# Patient Record
Sex: MALE | Race: WHITE | Employment: UNEMPLOYED | URBAN - METROPOLITAN AREA
[De-identification: names, ages, dates, MRNs, and addresses within clinical notes are randomized per-mention and may not be internally consistent; named-entity substitution may affect disease eponyms.]

---

## 2017-06-26 ENCOUNTER — TRANSFERRED RECORDS (OUTPATIENT)
Dept: HEALTH INFORMATION MANAGEMENT | Facility: CLINIC | Age: 57
End: 2017-06-26

## 2017-06-28 ENCOUNTER — TRANSFERRED RECORDS (OUTPATIENT)
Dept: HEALTH INFORMATION MANAGEMENT | Facility: CLINIC | Age: 57
End: 2017-06-28

## 2017-06-29 ENCOUNTER — TRANSFERRED RECORDS (OUTPATIENT)
Dept: HEALTH INFORMATION MANAGEMENT | Facility: CLINIC | Age: 57
End: 2017-06-29

## 2017-06-29 LAB
EJECTION FRACTION: 68
EJECTION FRACTION: 69

## 2017-06-30 ENCOUNTER — TRANSFERRED RECORDS (OUTPATIENT)
Dept: HEALTH INFORMATION MANAGEMENT | Facility: CLINIC | Age: 57
End: 2017-06-30

## 2017-10-04 ENCOUNTER — TRANSFERRED RECORDS (OUTPATIENT)
Dept: HEALTH INFORMATION MANAGEMENT | Facility: CLINIC | Age: 57
End: 2017-10-04

## 2017-10-23 ENCOUNTER — TRANSFERRED RECORDS (OUTPATIENT)
Dept: HEALTH INFORMATION MANAGEMENT | Facility: CLINIC | Age: 57
End: 2017-10-23

## 2017-10-24 ENCOUNTER — MEDICAL CORRESPONDENCE (OUTPATIENT)
Dept: HEALTH INFORMATION MANAGEMENT | Facility: CLINIC | Age: 57
End: 2017-10-24

## 2017-10-24 ENCOUNTER — TRANSFERRED RECORDS (OUTPATIENT)
Dept: HEALTH INFORMATION MANAGEMENT | Facility: CLINIC | Age: 57
End: 2017-10-24

## 2017-11-07 ENCOUNTER — TELEPHONE (OUTPATIENT)
Dept: TRANSPLANT | Facility: CLINIC | Age: 57
End: 2017-11-07

## 2017-11-07 NOTE — LETTER
2017    Selena Givens  [place] [address] [address] [facility] [address]  Phone: [phone]  Fax: [phone]     Re: Charanjit Ibarra  : 1960     Dear Dr. Selena Givens,    Thank you for referring your patient, Charanjit Ibarra. We are writing to inform you that Mr. Ibarra has completed the initial referral process with us to be considered for a liver transplant at the [facility].    Mr. Ibarra assigned transplant coordinator is Rosalba Peres.  If you should have any questions or concerns, please feel free to call us at [phone] or [phone].  Our regular office hours are Monday - Friday from 8:30am to 5:00pm.    Sincerely,        Liver Transplant Team  [facility]

## 2017-11-07 NOTE — TELEPHONE ENCOUNTER
Received liver referral from [facility]  Referring- Selena Givens  Diagnosis- Alcoholic Cirrhosis of liver with ascites  Coordinator- Rosalba Peres  Insurance- Medicare-[id number] & B  [employer] [place] [employer]- ID- [id number]

## 2017-11-08 VITALS — HEIGHT: 68 IN | WEIGHT: 175 LBS | BODY MASS INDEX: 26.52 KG/M2

## 2017-11-08 NOTE — TELEPHONE ENCOUNTER
Intake Progress Note    Organ: Liver    Initial Call Made by: records [facility]    Referring Physician:Dr Selena Givens    Seen Dr Tatiana Hoffman    PCP- Dr Teddy Young- [place]    Assigned Coordinator: Rosalba Peres    Reported Diagnosis: Decompensated ETOH Cirrhosis      On Dialysis:   No    Dialysis Schedule:  Days/shift  or N/A    Reported Medical History: Cirrhosis- had paracentesis in the past- increased diuretics and last 2 times has not needed a paracentesis, No inpt stays past year    Records:  I will request.     Clinic RN Appt (Only Adult Kidney, Liver and Pancreas Referrals): Y    Preferred Evaluation Start Date:  ASAP     Online Forms    Best time patient can be reached: anytime    Type of packet sent:     Liver packet     Misc. Notes: May speak with emergency contacts listed in OTTR. Caregiver- Kristen    Verbal Consent: Y     Email Consent: Y or N    If no PCP or referring information the time of call informed pt to call back with information: Y or N    Informed patient to call if doesn't receive packet and or phone call from coordinator within given time Y

## 2017-11-09 ENCOUNTER — APPOINTMENT (OUTPATIENT)
Dept: TRANSPLANT | Facility: CLINIC | Age: 57
End: 2017-11-09
Attending: INTERNAL MEDICINE
Payer: MEDICARE

## 2017-11-09 NOTE — TELEPHONE ENCOUNTER
"Referred by: GI, Dr. Givens. Liver eval in [place] 6/17, reports too healthy and lack of support he was not a candidate. Records requested.      Alcoholic cirrhosis but patient voices due to \" all of the medications they put me on\", stopped drinking over a year ago with no AA/treatment. Ascites managed by diuretics with last tap >6 mo, HE managed with lactulose and Rifaximin, Varices grade 1-2, due for EGD. Colonoscopy 2017, due 2022. Chronic opiate use due to DDD.     MELD 23 from 10/2017     Social History  Lives with his girlfriend, Kylee.   Working: Disabled for 15 years  ADL's: caretaker to help. DDD     PMH: chronic opiate use, HTN,  DDD, PTSD (reports worked for [employer] that no one talks about)     Surgical History: umbilical hernia and arm/hand trauma    Nicotine:   NA          Plan: Liver evaluation week of 12/11/2017. Orders sent.     Patient contacted to discuss liver transplant evaluation, purpose and process. Conversation included: discussing transplant team, scans, x-rays and lab draws, including HIV consent. History was obtained (see snapshot) and general health maintenance encouraged (dental, alcohol/drug free, low sodium diet, healthy eating/exercise). Patient appeared receptive and questions were answered. Patient aware he will receive transplant packet and agrees to return application and MONTY upon completion.             "

## 2017-11-10 ENCOUNTER — MEDICAL CORRESPONDENCE (OUTPATIENT)
Dept: TRANSPLANT | Facility: CLINIC | Age: 57
End: 2017-11-10

## 2017-11-13 DIAGNOSIS — Z79.891 LONG TERM CURRENT USE OF OPIATE ANALGESIC: ICD-10-CM

## 2017-11-13 DIAGNOSIS — F10.11 ALCOHOL ABUSE, IN REMISSION: ICD-10-CM

## 2017-11-13 DIAGNOSIS — Z12.5 ENCOUNTER FOR SCREENING FOR MALIGNANT NEOPLASM OF PROSTATE: ICD-10-CM

## 2017-11-13 DIAGNOSIS — K70.31 ALCOHOLIC CIRRHOSIS OF LIVER WITH ASCITES (H): Primary | ICD-10-CM

## 2017-11-13 DIAGNOSIS — E55.9 VITAMIN D DEFICIENCY: ICD-10-CM

## 2017-11-13 DIAGNOSIS — R93.89 ABNORMAL FINDINGS ON DIAGNOSTIC IMAGING OF OTHER SPECIFIED BODY STRUCTURES: ICD-10-CM

## 2017-11-13 DIAGNOSIS — K74.60 CIRRHOSIS (H): ICD-10-CM

## 2017-11-14 ENCOUNTER — MEDICAL CORRESPONDENCE (OUTPATIENT)
Dept: TRANSPLANT | Facility: CLINIC | Age: 57
End: 2017-11-14

## 2017-11-15 ENCOUNTER — MEDICAL CORRESPONDENCE (OUTPATIENT)
Dept: TRANSPLANT | Facility: CLINIC | Age: 57
End: 2017-11-15

## 2017-11-15 ENCOUNTER — TELEPHONE (OUTPATIENT)
Dept: TRANSPLANT | Facility: CLINIC | Age: 57
End: 2017-11-15

## 2017-11-15 NOTE — TELEPHONE ENCOUNTER
November 15: I called Charanjit this afternoon and left a message to introduce myself and acknowledge a change in evaluation date. I confirmed that we will work on his schedule for Monday, December 18 and Tuesday, December 19.    Indira Forrest  Pre-Liver Transplant   [phone]    Rosalba Peres, RN  Indira Forrest            Orders Only for Transplant Evaluation  11/13/2017       Rosalba Peres RN - [facility] Solid Organ Transplant Encounter Summary       Diagnoses       Alcoholic Cirrhosis Of Liver With Ascites (h) (Primary)       Abnormal findings on diagnostic imaging of other specified body structures; Encounter for screening for malignant neoplasm of prostate; Vitamin D deficiency; Long term current use of opiate analgesic; Alcohol abuse, in remission; Cirrhosis (H)                Orders Signed This Encounter (38)      Hepatitis C antibody        Ethyl Glucuronide Urine        X-ray Chest 2 vws [IMG36]        CARDIOLOGY EVAL ADULT REFERRAL        UA with Microscopic reflex to Culture        Anti Treponema        HIV Antigen Antibody Combo Pretransplant        Hepatitis B surface antigen        Hepatitis B Surface Antibody        Hepatitis B core antibody        Hepatitis A Antibody IgG        EBV Capsid Antibody IgG        CMV Antibody IgG        Protein  random urine with Creat Ratio        CBC with platelets        INR        TSH with free T4 reflex        Vitamin D Deficiency        Transferrin        Prostate spec antigen screen        Phosphorus        M Tuberculosis by Quantiferon        Iron and iron binding capacity        AFP tumor marker        Hepatic panel        Basic metabolic panel        ABO type [LZL1308]        Antibody titer red cell        ABO/Rh type and screen        Pulse oximetry nursing        Echo dobutamine stress test        EKG 12-lead, tracing only [EKG1]        US Abdomen Complete w Doppler Complete        NUTRITION REFERRAL         REFERRAL         GENERAL SURG ADULT REFERRAL        GASTROENTEROLOGY ADULT REF CONSULT ONLY        Pre-Transplant Class

## 2017-12-04 NOTE — TELEPHONE ENCOUNTER
December 4, 2017: Kristen called back to approve appointments on December 18 and 19. I gave her the telephone number for the Accommodations Department.    Indira Lowe  Pre-Liver Transplant   [phone]

## 2017-12-07 NOTE — TELEPHONE ENCOUNTER
December 7, 2017: I spoke with Kristen to confirm that we're scheduling Charanjit for December 18 and 19.    She asked me to email something to [email or web address] so she can get a medical discount on the hotel.    Indira Lowe  Pre-Liver Transplant   [phone]

## 2017-12-18 ENCOUNTER — HOSPITAL ENCOUNTER (OUTPATIENT)
Dept: CARDIOLOGY | Facility: CLINIC | Age: 57
End: 2017-12-18
Attending: INTERNAL MEDICINE
Payer: MEDICARE

## 2017-12-18 ENCOUNTER — OFFICE VISIT (OUTPATIENT)
Dept: TRANSPLANT | Facility: CLINIC | Age: 57
End: 2017-12-18
Attending: INTERNAL MEDICINE
Payer: MEDICARE

## 2017-12-18 ENCOUNTER — HOSPITAL ENCOUNTER (OUTPATIENT)
Dept: GENERAL RADIOLOGY | Facility: CLINIC | Age: 57
End: 2017-12-18
Attending: INTERNAL MEDICINE
Payer: MEDICARE

## 2017-12-18 ENCOUNTER — HOSPITAL ENCOUNTER (OUTPATIENT)
Dept: CARDIOLOGY | Facility: CLINIC | Age: 57
Discharge: HOME | End: 2017-12-18
Attending: INTERNAL MEDICINE | Admitting: INTERNAL MEDICINE
Payer: MEDICARE

## 2017-12-18 DIAGNOSIS — K70.31 ALCOHOLIC CIRRHOSIS OF LIVER WITH ASCITES (H): ICD-10-CM

## 2017-12-18 DIAGNOSIS — R93.89 ABNORMAL FINDINGS ON DIAGNOSTIC IMAGING OF OTHER SPECIFIED BODY STRUCTURES: ICD-10-CM

## 2017-12-18 DIAGNOSIS — K70.30 LAENNEC'S CIRRHOSIS (ALCOHOLIC) (H): Primary | ICD-10-CM

## 2017-12-18 DIAGNOSIS — Z79.891 LONG TERM CURRENT USE OF OPIATE ANALGESIC: ICD-10-CM

## 2017-12-18 PROCEDURE — 25000128 H RX IP 250 OP 636: Performed by: INTERNAL MEDICINE

## 2017-12-18 PROCEDURE — 93350 STRESS TTE ONLY: CPT | Mod: 26 | Performed by: INTERNAL MEDICINE

## 2017-12-18 PROCEDURE — 40000264 ECHO STRESS DOBUTAMINE WITH OPTISON

## 2017-12-18 PROCEDURE — 25500064 ZZH RX 255 OP 636: Performed by: INTERNAL MEDICINE

## 2017-12-18 PROCEDURE — 71020 XR CHEST 2 VW: CPT

## 2017-12-18 PROCEDURE — 93018 CV STRESS TEST I&R ONLY: CPT | Performed by: INTERNAL MEDICINE

## 2017-12-18 PROCEDURE — 25000125 ZZHC RX 250: Performed by: INTERNAL MEDICINE

## 2017-12-18 PROCEDURE — 93321 DOPPLER ECHO F-UP/LMTD STD: CPT | Mod: 26 | Performed by: INTERNAL MEDICINE

## 2017-12-18 PROCEDURE — 93325 DOPPLER ECHO COLOR FLOW MAPG: CPT | Mod: 26 | Performed by: INTERNAL MEDICINE

## 2017-12-18 PROCEDURE — 93016 CV STRESS TEST SUPVJ ONLY: CPT | Performed by: INTERNAL MEDICINE

## 2017-12-18 PROCEDURE — 93005 ELECTROCARDIOGRAM TRACING: CPT

## 2017-12-18 RX ORDER — DOBUTAMINE HYDROCHLORIDE 200 MG/100ML
10-50 INJECTION INTRAVENOUS CONTINUOUS
Status: ACTIVE | OUTPATIENT
Start: 2017-12-18 | End: 2017-12-18

## 2017-12-18 RX ORDER — METOPROLOL TARTRATE 1 MG/ML
1-30 INJECTION, SOLUTION INTRAVENOUS
Status: DISPENSED | OUTPATIENT
Start: 2017-12-18 | End: 2017-12-18

## 2017-12-18 RX ADMIN — DOBUTAMINE IN DEXTROSE 30 MCG/KG/MIN: 200 INJECTION, SOLUTION INTRAVENOUS at 13:03

## 2017-12-18 RX ADMIN — HUMAN ALBUMIN MICROSPHERES AND PERFLUTREN 5 ML: 10; .22 INJECTION, SOLUTION INTRAVENOUS at 13:13

## 2017-12-18 RX ADMIN — METOROPROLOL TARTRATE 2 MG: 5 INJECTION, SOLUTION INTRAVENOUS at 13:10

## 2017-12-18 NOTE — PROGRESS NOTES
Liver Transplant Evaluation/Teaching    TEACHING TOPICS: Evaluation Process, Evaluation Items, Diagnostic Studies, Consultation, Chemical Dependency Policy, CD Eval, Donor Source, Liver Allocation, MELD System, UNOS, Waiting List, Follow up while on transplant list, Follow up after transplantation, Infection and Rejection, Immunosuppression , Medication Teaching, Lab Recording after transplant, Laboratory Frequency after transplant , Consent for evaluation and One year survival rates  INSTRUCTIONAL MATERIAL USED/GIVEN: Liver Transplant Handbook, MELD Booklet, Donor Booklet, Web Sites Options, Verbal instructions, Multiple Listing Brochure , Consent for evaluation signed, One year survival rates and SRTR (Scientific Registry) Data  Person(s) involved in teaching: patient and significant other  Asks Questions: YES  Eager to Learn: YES  Cooperative: YES  Receptive (willing/able to accept information): YES  Reason for the appointment, diagnosis and treatment plan:YES  Knowledge of proper use of medications and conditions for which they are ordered (with special attention to potential side effects or drug interactions): YES  Which situations necessitate calling provider and whom to contact:YES  Other: ED for GI bleed  Teaching Concerns Addressed   Comments: Patient attended class on 2017. Patient asked appropriate questions and verbalized understanding of the material.   Proper use and care of (medical equip, care aids, etc.):YES  Nutritional needs and diet plan: YES  Pain management techniques: YES  Wound Care: YES  How and/when to access community resources: YES  Patient is aware of and agrees to required commitment to post-op care and long term follow-up: YES  Patient has name and phone number of transplant coordinator.   Time Spent face-to-face teachin minutes.

## 2017-12-18 NOTE — LETTER
12/18/2017       RE: Charanjit Ibarra  [address]  [place] [facility] [address]     Dear Colleague,    Thank you for referring your patient, Charanjit Ibarra, to the [facility] SOLID ORGAN TRANSPLANT at [facility]. Please see a copy of my visit note below.    Liver Transplant Evaluation/Teaching    TEACHING TOPICS: Evaluation Process, Evaluation Items, Diagnostic Studies, Consultation, Chemical Dependency Policy, CD Eval, Donor Source, Liver Allocation, MELD System, UNOS, Waiting List, Follow up while on transplant list, Follow up after transplantation, Infection and Rejection, Immunosuppression , Medication Teaching, Lab Recording after transplant, Laboratory Frequency after transplant , Consent for evaluation and One year survival rates  INSTRUCTIONAL MATERIAL USED/GIVEN: Liver Transplant Handbook, MELD Booklet, Donor Booklet, Web Sites Options, Verbal instructions, Multiple Listing Brochure , Consent for evaluation signed, One year survival rates and SRTR (Scientific Registry) Data  Person(s) involved in teaching: patient and significant other  Asks Questions: YES  Eager to Learn: YES  Cooperative: YES  Receptive (willing/able to accept information): YES  Reason for the appointment, diagnosis and treatment plan:YES  Knowledge of proper use of medications and conditions for which they are ordered (with special attention to potential side effects or drug interactions): YES  Which situations necessitate calling provider and whom to contact:YES  Other: ED for GI bleed  Teaching Concerns Addressed   Comments: Patient attended class on 12/18/2017. Patient asked appropriate questions and verbalized understanding of the material.   Proper use and care of (medical equip, care aids, etc.):YES  Nutritional needs and diet plan: YES  Pain management techniques: YES  Wound Care: YES  How and/when to access community resources: YES  Patient is aware of and agrees to required commitment to  post-op care and long term follow-up: YES  Patient has name and phone number of transplant coordinator.   Time Spent face-to-face teachin minutes.            Again, thank you for allowing me to participate in the care of your patient.      Sincerely,    Transplant Class

## 2017-12-18 NOTE — PROGRESS NOTES
Pt here for dobutamine stress test.  Test, meds and side effects reviewed with patient.  Achieved target HR at 50 mcg/kg/min Dobutamine.  Gave a total of 2 mg IV Metoprolol to bring HR back to baseline.  Post monitoring complete and VSS.  Pt escorted out to the gold waiting room.

## 2017-12-18 NOTE — MR AVS SNAPSHOT
After Visit Summary   12/18/2017    Charanjit Ibarra    MRN: [id number]           Patient Information     Date Of Birth          1960        Visit Information        Provider Department      12/18/2017 10:00 AM  TRANSPLANT CLASS [facility] Solid Organ Transplant        Today's Diagnoses     Laennec's cirrhosis (alcoholic) (H)    -  1       Follow-ups after your visit        Your next 10 appointments already scheduled     Dec 19, 2017  7:30 AM CST   US ABDOMEN/PELVIS DUPLEX COMPLETE with UCUS2   [facility] Imaging Center US ([facility])    [address] [place] [id number] [phone]           Please bring a list of your medicines (including vitamins, minerals and over-the-counter drugs). Also, tell your doctor about any allergies you may have. Wear comfortable clothes and leave your valuables at home.  Adults: No eating or drinking for 8 hours before the exam. You may take medicine with a small sip of water.  Children: - Children 6+ years: No food or drink for 6 hours before exam. - Children 1-5 years: No food or drink for 4 hours before exam. - Infants, breast-fed: may have breast milk up to 2 hours before exam. - Infants, formula: may have bottle until 4 hours before exam.  Please call the Imaging Department at your exam site with any questions.            Dec 19, 2017  8:30 AM CST   Lab with  LAB   [facility] Lab ([facility])    [address] [place] [id number] [phone]            Dec 19, 2017  8:45 AM CST   Lab with  LAB   [facility] Lab ([facility])    [address] [place] [address]   [phone]            Dec 19, 2017  9:00 AM CST   (Arrive by 8:45 AM)   New Patient Visit with  TXC EVALUATION   [facility] Solid Organ Transplant ([facility])    [address] [place] [phone]    [phone]            Dec 19, 2017  9:30 AM CST   (Arrive by 9:15 AM)   Liver Transplant Pre-Op with Oumou Pink MD   [facility] Solid Organ Transplant ([facility])    [address] [place] [address]   [phone]            Dec 19, 2017 10:00 AM CST   NUTRITION VISIT with Nabila Jacobs RD   [facility] Solid Organ Transplant ([facility])    [address] [place] [address]   [phone]            Dec 19, 2017 11:00 AM CST   (Arrive by 10:45 AM)   Pre-Transplant Liver Evaluation with Angelito Dietz MD   [facility] Hepatology ([facility])    [address] [place] [address]   [phone]            Dec 19, 2017 12:00 PM CST   (Arrive by 11:45 AM)   SOT SOCIAL WORK EVAL with Janny Mcclelland [facility] Solid Organ Transplant ([facility])    [address] [place] [address]   [phone]            Jan 16, 2018  2:30 PM CST   (Arrive by 2:15 PM)   NEW LIVER EVALUATION with STEPHANY Montero MD   [facility] Heart Care ([facility])    [address] [place] [id number] [phone]              Future tests that were ordered for you today     Open Future Orders        Priority Expected Expires Ordered    ECHO STRESS DOBUTAMINE WITH OPTISON Routine  11/13/2018 11/13/2017            Who to contact     If you have questions or need follow up information about today's clinic visit or your schedule please contact [facility] SOLID ORGAN TRANSPLANT directly at [phone].  Normal or non-critical lab and imaging results will be communicated to you by MyChart, letter or phone within 4 business days after the clinic has received the results. If you do not hear from us within 7 days, please contact the clinic through MyChart or phone. If  you have a critical or abnormal lab result, we will notify you by phone as soon as possible.  Submit refill requests through [employer]arii or call your pharmacy and they will forward the refill request to us. Please allow 3 business days for your refill to be completed.          Additional Information About Your Visit        EPV SOLARhart Information     Satarii gives you secure access to your electronic health record. If you see a primary care provider, you can also send messages to your care team and make appointments. If you have questions, please call your primary care clinic.  If you do not have a primary care provider, please call [phone] and they will assist you.        Care EveryWhere ID     This is your Care EveryWhere ID. This could be used by other organizations to access your [place] medical records  [id number]         Blood Pressure from Last 3 Encounters:   No data found for BP    Weight from Last 3 Encounters:   11/08/17 79.4 kg (175 lb)              Today, you had the following     No orders found for display       Primary Care Provider Fax #    Physician No Ref-Primary [phone]       No address on file        Equal Access to Services     [facility] : Hadii ryley ku hadasho Soomaali, waaxda luqadaha, qaybta kaalmada adeaudreyyawoo, lena srivastava . So [facility] [phone].    ATENCIÓN: Si habla español, tiene a hall disposición servicios gratuitos de asistencia lingüística. Llame al [phone].    We comply with applicable federal civil rights laws and [place] laws. We do not discriminate on the basis of race, color, national origin, age, disability, sex, sexual orientation, or gender identity.            Thank you!     Thank you for choosing [facility] SOLID ORGAN TRANSPLANT  for your care. Our goal is always to provide you with excellent care. Hearing back from our patients is one way we can continue to improve our services. Please take a few minutes to complete the written survey  that you may receive in the mail after your visit with us. Thank you!             Your Updated Medication List - Protect others around you: Learn how to safely use, store and throw away your medicines at www.disposemymeds.org.      Notice  As of 12/18/2017  3:55 PM    You have not been prescribed any medications.

## 2017-12-19 ENCOUNTER — RADIANT APPOINTMENT (OUTPATIENT)
Dept: ULTRASOUND IMAGING | Facility: CLINIC | Age: 57
End: 2017-12-19
Attending: INTERNAL MEDICINE
Payer: COMMERCIAL

## 2017-12-19 ENCOUNTER — COMMITTEE REVIEW (OUTPATIENT)
Dept: TRANSPLANT | Facility: CLINIC | Age: 57
End: 2017-12-19

## 2017-12-19 ENCOUNTER — ALLIED HEALTH/NURSE VISIT (OUTPATIENT)
Dept: TRANSPLANT | Facility: CLINIC | Age: 57
End: 2017-12-19
Attending: INTERNAL MEDICINE
Payer: MEDICARE

## 2017-12-19 ENCOUNTER — APPOINTMENT (OUTPATIENT)
Dept: LAB | Facility: CLINIC | Age: 57
End: 2017-12-19
Payer: COMMERCIAL

## 2017-12-19 ENCOUNTER — OFFICE VISIT (OUTPATIENT)
Dept: GASTROENTEROLOGY | Facility: CLINIC | Age: 57
End: 2017-12-19
Attending: INTERNAL MEDICINE
Payer: MEDICARE

## 2017-12-19 VITALS
HEIGHT: 68 IN | RESPIRATION RATE: 18 BRPM | BODY MASS INDEX: 27.87 KG/M2 | OXYGEN SATURATION: 99 % | SYSTOLIC BLOOD PRESSURE: 131 MMHG | TEMPERATURE: 98.2 F | WEIGHT: 183.9 LBS | HEART RATE: 93 BPM | DIASTOLIC BLOOD PRESSURE: 80 MMHG

## 2017-12-19 DIAGNOSIS — K70.31 ALCOHOLIC CIRRHOSIS OF LIVER WITH ASCITES (H): ICD-10-CM

## 2017-12-19 DIAGNOSIS — Z79.891 LONG TERM CURRENT USE OF OPIATE ANALGESIC: ICD-10-CM

## 2017-12-19 DIAGNOSIS — K70.31 ALCOHOLIC CIRRHOSIS OF LIVER WITH ASCITES (H): Primary | ICD-10-CM

## 2017-12-19 DIAGNOSIS — F10.11 ALCOHOL ABUSE, IN REMISSION: ICD-10-CM

## 2017-12-19 DIAGNOSIS — E55.9 VITAMIN D DEFICIENCY: ICD-10-CM

## 2017-12-19 DIAGNOSIS — K70.30 ALCOHOLIC CIRRHOSIS (H): Primary | ICD-10-CM

## 2017-12-19 DIAGNOSIS — Z12.5 ENCOUNTER FOR SCREENING FOR MALIGNANT NEOPLASM OF PROSTATE: ICD-10-CM

## 2017-12-19 DIAGNOSIS — Z76.82 ORGAN TRANSPLANT CANDIDATE: Primary | ICD-10-CM

## 2017-12-19 DIAGNOSIS — Z01.818 PRE-TRANSPLANT EVALUATION FOR LIVER TRANSPLANT: Primary | ICD-10-CM

## 2017-12-19 LAB
ABO + RH BLD: NORMAL
AFP SERPL-MCNC: 4.3 UG/L (ref 0–8)
ALBUMIN SERPL-MCNC: 3.2 G/DL (ref 3.4–5)
ALBUMIN UR-MCNC: NEGATIVE MG/DL
ALP SERPL-CCNC: 144 U/L (ref 40–150)
ALT SERPL W P-5'-P-CCNC: 17 U/L (ref 0–70)
ANION GAP SERPL CALCULATED.3IONS-SCNC: 9 MMOL/L (ref 3–14)
APPEARANCE UR: CLEAR
AST SERPL W P-5'-P-CCNC: 26 U/L (ref 0–45)
BILIRUB DIRECT SERPL-MCNC: 1.3 MG/DL (ref 0–0.2)
BILIRUB SERPL-MCNC: 4.5 MG/DL (ref 0.2–1.3)
BILIRUB UR QL STRIP: NEGATIVE
BLD GP AB SCN SERPL QL: NORMAL
BLD GP AB SCN TITR SERPL: NORMAL {TITER}
BLOOD BANK CMNT PATIENT-IMP: NORMAL
BUN SERPL-MCNC: 14 MG/DL (ref 7–30)
CALCIUM SERPL-MCNC: 8.6 MG/DL (ref 8.5–10.1)
CHLORIDE SERPL-SCNC: 97 MMOL/L (ref 94–109)
CMV IGG SERPL QL IA: >8 AI (ref 0–0.8)
CO2 SERPL-SCNC: 27 MMOL/L (ref 20–32)
COLOR UR AUTO: YELLOW
CREAT SERPL-MCNC: 1.09 MG/DL (ref 0.66–1.25)
CREAT UR-MCNC: 18 MG/DL
DEPRECATED CALCIDIOL+CALCIFEROL SERPL-MC: 15 UG/L (ref 20–75)
EBV VCA IGG SER QL IA: >8 AI (ref 0–0.8)
ERYTHROCYTE [DISTWIDTH] IN BLOOD BY AUTOMATED COUNT: 14.2 % (ref 10–15)
GFR SERPL CREATININE-BSD FRML MDRD: 69 ML/MIN/1.7M2
GLUCOSE SERPL-MCNC: 136 MG/DL (ref 70–99)
GLUCOSE UR STRIP-MCNC: NEGATIVE MG/DL
HAV IGG SER QL IA: REACTIVE
HBV CORE AB SERPL QL IA: NONREACTIVE
HBV SURFACE AB SERPL IA-ACNC: 26.86 M[IU]/ML
HBV SURFACE AG SERPL QL IA: NONREACTIVE
HCT VFR BLD AUTO: 30.1 % (ref 40–53)
HCV AB SERPL QL IA: NONREACTIVE
HGB BLD-MCNC: 10.4 G/DL (ref 13.3–17.7)
HGB UR QL STRIP: NEGATIVE
HIV 1+2 AB+HIV1 P24 AG SERPL QL IA: NONREACTIVE
INR PPP: 1.93 (ref 0.86–1.14)
INTERPRETATION ECG - MUSE: NORMAL
IRON SATN MFR SERPL: 35 % (ref 15–46)
IRON SERPL-MCNC: 76 UG/DL (ref 35–180)
KETONES UR STRIP-MCNC: NEGATIVE MG/DL
LEUKOCYTE ESTERASE UR QL STRIP: NEGATIVE
MCH RBC QN AUTO: 34.1 PG (ref 26.5–33)
MCHC RBC AUTO-ENTMCNC: 34.6 G/DL (ref 31.5–36.5)
MCV RBC AUTO: 99 FL (ref 78–100)
MUCOUS THREADS #/AREA URNS LPF: PRESENT /LPF
NITRATE UR QL: NEGATIVE
PH UR STRIP: 6 PH (ref 5–7)
PHOSPHATE SERPL-MCNC: 4.4 MG/DL (ref 2.5–4.5)
PLATELET # BLD AUTO: 56 10E9/L (ref 150–450)
POTASSIUM SERPL-SCNC: 4.3 MMOL/L (ref 3.4–5.3)
PROT SERPL-MCNC: 7.2 G/DL (ref 6.8–8.8)
PROT UR-MCNC: <0.05 G/L
PROT/CREAT 24H UR: NORMAL G/G CR (ref 0–0.2)
PSA SERPL-ACNC: 0.06 UG/L (ref 0–4)
RBC # BLD AUTO: 3.05 10E12/L (ref 4.4–5.9)
RBC #/AREA URNS AUTO: 1 /HPF (ref 0–2)
SODIUM SERPL-SCNC: 133 MMOL/L (ref 133–144)
SOURCE: ABNORMAL
SP GR UR STRIP: 1 (ref 1–1.03)
SPECIMEN EXP DATE BLD: NORMAL
SPECIMEN EXP DATE BLD: NORMAL
T PALLIDUM IGG+IGM SER QL: NEGATIVE
TIBC SERPL-MCNC: 219 UG/DL (ref 240–430)
TRANSFERRIN SERPL-MCNC: 192 MG/DL (ref 210–360)
TSH SERPL DL<=0.005 MIU/L-ACNC: 3.17 MU/L (ref 0.4–4)
UROBILINOGEN UR STRIP-MCNC: 2 MG/DL (ref 0–2)
WBC # BLD AUTO: 4.6 10E9/L (ref 4–11)
WBC #/AREA URNS AUTO: <1 /HPF (ref 0–2)

## 2017-12-19 PROCEDURE — 86480 TB TEST CELL IMMUN MEASURE: CPT | Performed by: INTERNAL MEDICINE

## 2017-12-19 PROCEDURE — 83540 ASSAY OF IRON: CPT | Performed by: INTERNAL MEDICINE

## 2017-12-19 PROCEDURE — 86706 HEP B SURFACE ANTIBODY: CPT | Performed by: INTERNAL MEDICINE

## 2017-12-19 PROCEDURE — 86704 HEP B CORE ANTIBODY TOTAL: CPT | Performed by: INTERNAL MEDICINE

## 2017-12-19 PROCEDURE — 84466 ASSAY OF TRANSFERRIN: CPT | Performed by: INTERNAL MEDICINE

## 2017-12-19 PROCEDURE — G0103 PSA SCREENING: HCPCS | Performed by: INTERNAL MEDICINE

## 2017-12-19 PROCEDURE — 84156 ASSAY OF PROTEIN URINE: CPT | Performed by: INTERNAL MEDICINE

## 2017-12-19 PROCEDURE — 80048 BASIC METABOLIC PNL TOTAL CA: CPT | Performed by: INTERNAL MEDICINE

## 2017-12-19 PROCEDURE — 86665 EPSTEIN-BARR CAPSID VCA: CPT | Performed by: INTERNAL MEDICINE

## 2017-12-19 PROCEDURE — 86708 HEPATITIS A ANTIBODY: CPT | Performed by: INTERNAL MEDICINE

## 2017-12-19 PROCEDURE — 86803 HEPATITIS C AB TEST: CPT | Performed by: INTERNAL MEDICINE

## 2017-12-19 PROCEDURE — 85610 PROTHROMBIN TIME: CPT | Performed by: INTERNAL MEDICINE

## 2017-12-19 PROCEDURE — 87340 HEPATITIS B SURFACE AG IA: CPT | Performed by: INTERNAL MEDICINE

## 2017-12-19 PROCEDURE — 82105 ALPHA-FETOPROTEIN SERUM: CPT | Performed by: INTERNAL MEDICINE

## 2017-12-19 PROCEDURE — 85027 COMPLETE CBC AUTOMATED: CPT | Performed by: INTERNAL MEDICINE

## 2017-12-19 PROCEDURE — 40000866 ZZHCL STATISTIC HIV 1/2 ANTIGEN/ANTIBODY PRETRANSPLANT ONLY: Performed by: INTERNAL MEDICINE

## 2017-12-19 PROCEDURE — 36415 COLL VENOUS BLD VENIPUNCTURE: CPT | Performed by: INTERNAL MEDICINE

## 2017-12-19 PROCEDURE — 83550 IRON BINDING TEST: CPT | Performed by: INTERNAL MEDICINE

## 2017-12-19 PROCEDURE — 86644 CMV ANTIBODY: CPT | Performed by: INTERNAL MEDICINE

## 2017-12-19 PROCEDURE — 84443 ASSAY THYROID STIM HORMONE: CPT | Performed by: INTERNAL MEDICINE

## 2017-12-19 PROCEDURE — 80321 ALCOHOLS BIOMARKERS 1OR 2: CPT | Performed by: INTERNAL MEDICINE

## 2017-12-19 PROCEDURE — 82306 VITAMIN D 25 HYDROXY: CPT | Performed by: INTERNAL MEDICINE

## 2017-12-19 PROCEDURE — 81001 URINALYSIS AUTO W/SCOPE: CPT | Mod: XU | Performed by: INTERNAL MEDICINE

## 2017-12-19 PROCEDURE — 86901 BLOOD TYPING SEROLOGIC RH(D): CPT | Performed by: INTERNAL MEDICINE

## 2017-12-19 PROCEDURE — 80076 HEPATIC FUNCTION PANEL: CPT | Performed by: INTERNAL MEDICINE

## 2017-12-19 PROCEDURE — 86900 BLOOD TYPING SEROLOGIC ABO: CPT | Performed by: INTERNAL MEDICINE

## 2017-12-19 PROCEDURE — 86850 RBC ANTIBODY SCREEN: CPT | Performed by: INTERNAL MEDICINE

## 2017-12-19 PROCEDURE — 86780 TREPONEMA PALLIDUM: CPT | Performed by: INTERNAL MEDICINE

## 2017-12-19 PROCEDURE — 84100 ASSAY OF PHOSPHORUS: CPT | Performed by: INTERNAL MEDICINE

## 2017-12-19 RX ORDER — OMEPRAZOLE 20 MG/1
20 TABLET, DELAYED RELEASE ORAL 2 TIMES DAILY
Status: ON HOLD | COMMUNITY
Start: 2017-08-30 | End: 2020-01-01

## 2017-12-19 RX ORDER — LEVOTHYROXINE SODIUM 50 UG/1
50 TABLET ORAL DAILY
COMMUNITY
Start: 2017-03-27

## 2017-12-19 RX ORDER — FERROUS SULFATE 325(65) MG
325 TABLET ORAL DAILY
COMMUNITY
Start: 2016-06-27

## 2017-12-19 RX ORDER — CYCLOBENZAPRINE HCL 10 MG
TABLET ORAL
COMMUNITY
Start: 2017-11-10

## 2017-12-19 RX ORDER — FUROSEMIDE 20 MG
TABLET ORAL
COMMUNITY
Start: 2017-08-30

## 2017-12-19 RX ORDER — LACTULOSE 10 G/15ML
SOLUTION ORAL
COMMUNITY
Start: 2017-08-30

## 2017-12-19 RX ORDER — OXYCODONE HYDROCHLORIDE 15 MG/1
15 TABLET ORAL
COMMUNITY
Start: 2017-12-04 | End: 2018-01-03

## 2017-12-19 RX ORDER — SPIRONOLACTONE 50 MG/1
25 TABLET, FILM COATED ORAL DAILY
COMMUNITY
Start: 2017-08-30

## 2017-12-19 RX ORDER — PROPRANOLOL HYDROCHLORIDE 10 MG/1
10 TABLET ORAL 3 TIMES DAILY
COMMUNITY
Start: 2017-10-23

## 2017-12-19 NOTE — MR AVS SNAPSHOT
After Visit Summary   12/19/2017    Charanjit Ibarra    MRN: [id number]           Patient Information     Date Of Birth          1960        Visit Information        Provider Department      12/19/2017 10:00 AM Nabila Jacobs RD [facility] Solid Organ Transplant        Today's Diagnoses     Alcoholic cirrhosis of liver with ascites (H)    -  1       Follow-ups after your visit        Your next 10 appointments already scheduled     Dec 19, 2017 12:00 PM CST   (Arrive by 11:45 AM)   [facility]    [address] [place] [address] [phone]            Jan 16, 2018  2:30 PM CST   (Arrive by 2:15 PM)   NEW LIVER EVALUATION with STEPHANY Montero MD   [facility] Heart Care ([facility])    [address] [place] [phone] [phone]              Future tests that were ordered for you today     Open Future Orders        Priority Expected Expires Ordered    ECHO STRESS DOBUTAMINE WITH OPTISON Routine  11/13/2018 11/13/2017            Who to contact     If you have questions or need follow up information about today's clinic visit or your schedule please contact [facility] SOLID ORGAN TRANSPLANT directly at [phone].  Normal or non-critical lab and imaging results will be communicated to you by MyChart, letter or phone within 4 business days after the clinic has received the results. If you do not hear from us within 7 days, please contact the clinic through MyChart or phone. If you have a critical or abnormal lab result, we will notify you by phone as soon as possible.  Submit refill requests through [employer] or call your pharmacy and they will forward the refill request to us. Please allow 3 business days for your refill to be completed.          Additional Information About Your Visit        Pongo [employer]et  Information     [employer] gives you secure access to your electronic health record. If you see a primary care provider, you can also send messages to your care team and make appointments. If you have questions, please call your primary care clinic.  If you do not have a primary care provider, please call [phone] and they will assist you.        Care EveryWhere ID     This is your Care EveryWhere ID. This could be used by other organizations to access your [place] medical records  [id number]         Blood Pressure from Last 3 Encounters:   12/19/17 131/80    Weight from Last 3 Encounters:   12/19/17 83.4 kg (183 lb 14.4 oz)   11/08/17 79.4 kg (175 lb)              Today, you had the following     No orders found for display       Primary Care Provider Fax #    Physician No Ref-Primary [phone]       No address on file        Equal Access to Services     FABIO MAXWELL : Hadzane Hart, waaris glover, pina kaalmawoo rene, lena srivastava . So [facility] [phone].    ATENCIÓN: Si habla español, tiene a hall disposición servicios gratuitos de asistencia lingüística. Kem al [phone].    We comply with applicable federal civil rights laws and [place] laws. We do not discriminate on the basis of race, color, national origin, age, disability, sex, sexual orientation, or gender identity.            Thank you!     Thank you for choosing [facility] SOLID ORGAN TRANSPLANT  for your care. Our goal is always to provide you with excellent care. Hearing back from our patients is one way we can continue to improve our services. Please take a few minutes to complete the written survey that you may receive in the mail after your visit with us. Thank you!             Your Updated Medication List - Protect others around you: Learn how to safely use, store and throw away your medicines at www.disposemymeds.org.          This list is accurate as of: 12/19/17 11:12 AM.  Always use  your most recent med list.                   Brand Name Dispense Instructions for use Diagnosis    cyclobenzaprine 10 MG tablet    FLEXERIL     Take 1 Tab by mouth 3 times a day as needed for Muscle Spasms.        ferrous sulfate 325 (65 FE) MG tablet    IRON     Take 325 mg by mouth        furosemide 20 MG tablet    LASIX     Take 60 mg by mouth        lactulose 10 GM/15ML solution    CHRONULAC     Take 30 mL by mouth three times daily        levothyroxine 50 MCG tablet    SYNTHROID/LEVOTHROID     Take 50 mcg by mouth        Magnesium 65 MG Tabs      Take 64 mg by mouth        omeprazole 20 MG tablet    priLOSEC OTC     Take 20 mg by mouth        oxyCODONE IR 15 MG tablet    ROXICODONE     Take 15 mg by mouth        propranolol 10 MG tablet    INDERAL     Take 10 mg by mouth        spironolactone 50 MG tablet    ALDACTONE     Take 50 mg by mouth        XIFAXAN 550 MG Tabs tablet   Generic drug:  rifaximin      Take 550 mg by mouth

## 2017-12-19 NOTE — PROGRESS NOTES
[facility]    Hepatology New Patient Visit    Referring provider:  Sridevi Vann          Chief complaint: Alcoholic liver disease.      HPI:  Mr. Ibarra is a 57-year-old  male from [place].  He did get diagnosed with alcoholic liver disease in 2014.  He was followed by Dr. Hay and then he transitioned to Dr. Selena Kerr.  His main issue regarding his liver disease is he initially developed ascites and edema.  He was getting almost monthly paracentesis but he has not gotten any paracentesis in the last 6 months.  He also had a GI bleed 3 years ago which he attributes to some medication, although he did have on EGD grade I to II esophageal varices and he was treated with a nonselective beta blockers which was Propranolol 10 mg t.i.d.  He had a colonoscopy on 06/26 also were adenomas were removed.      Mr. Ibarra regarding his symptoms today, he tells me he has some confusion, although his main issue is sleeping during the day.  He lost also a significant amount of muscle mass.  He denies any abdominal pain, nausea or vomiting.  He is taking lactulose 3 times a day around 30 mL each time and is moving his bowels 3-4 times.  He does not have any jaundice.  He lost weight.  He did have in the past a fall and this was in 12/2016 where he had an intracranial bleed, which resolved on its own. Patient denies fevers, sweats, chills     Medical hx Surgical hx   Past Medical History:   Diagnosis Date     DJD (degenerative joint disease)      Hypothyroid      Long term (current) use of opiate analgesic       No past surgical history on file.       Medications  Prior to Admission medications    Medication Sig Start Date End Date Taking? Authorizing Provider   cyclobenzaprine (FLEXERIL) 10 MG tablet Take 1 Tab by mouth 3 times a day as needed for Muscle Spasms. 11/10/17   Reported, Patient   ferrous sulfate (IRON) 325 (65 FE) MG tablet Take 325 mg by mouth 6/27/16   Reported,  Patient   furosemide (LASIX) 20 MG tablet Take 60 mg by mouth 8/30/17   Reported, Patient   lactulose (CHRONULAC) 10 GM/15ML solution Take 30 mL by mouth three times daily 8/30/17   Reported, Patient   levothyroxine (SYNTHROID/LEVOTHROID) 50 MCG tablet Take 50 mcg by mouth 3/27/17   Reported, Patient   Magnesium 65 MG TABS Take 64 mg by mouth 8/30/17   Reported, Patient   omeprazole (PRILOSEC OTC) 20 MG tablet Take 20 mg by mouth 8/30/17   Reported, Patient   oxyCODONE IR (ROXICODONE) 15 MG tablet Take 15 mg by mouth 12/4/17 1/3/18  Reported, Patient   propranolol (INDERAL) 10 MG tablet Take 10 mg by mouth 10/23/17   Reported, Patient   spironolactone (ALDACTONE) 50 MG tablet Take 50 mg by mouth 8/30/17   Reported, Patient   rifaximin (XIFAXAN) 550 MG TABS tablet Take 550 mg by mouth 11/30/17   Reported, Patient       Allergies  Allergies   Allergen Reactions     Aspirin Other (See Comments)     Pt states that NSAIDS severely lowers his platelets     Nsaids Other (See Comments)     Pt states that NSAIDS severely lowers his platelets     Amoxicillin Swelling       Family hx Social hx   Family History   Problem Relation Age of Onset     Type 2 Diabetes Father      Hypothyroidism Sister      Hypothyroidism Brother       Social History   Substance Use Topics     Smoking status: Former Smoker     Types: Cigarettes     Start date: 12/19/1977     Smokeless tobacco: Never Used     Alcohol use No          Review of systems  He denies any recent infections, has fatigue.  He has occasional headaches, no seizures.  He has no cough, shortness of breath or chest pain.  Denies any anemia, some easy bruising.  He has significant psychiatric issues in the past including PTSD.  He has tinnitus in hearing.  Otherwise, he has dry skin and eczema as a young person.  Otherwise, a comprehensive review of systems was noncontributory.     Examination  There were no vitals taken for this visit.  There is no height or weight on file to  calculate BMI.    Gen- well, NAD, A+Ox3, normal color  Eye- EOMI  ENT- MMM, normal oropharynx  Lym- no palpable lymphadenopathy  CVS- S1, S2 normal, no added sounds, RRR  RS- CTA  Abd- Distended. Positive shifting dullness.  Extr- pulses good, no TIMOTHY  MS- hands normal- no clubbing  Neuro- A+Ox3, no asterixis  Skin- no rash or jaundice  Psych- normal mood    Laboratory  Lab Results   Component Value Date     12/19/2017    POTASSIUM 4.3 12/19/2017    CHLORIDE 97 12/19/2017    CO2 27 12/19/2017    BUN 14 12/19/2017    CR 1.09 12/19/2017       Lab Results   Component Value Date    BILITOTAL 4.5 12/19/2017    ALT 17 12/19/2017    AST 26 12/19/2017    ALKPHOS 144 12/19/2017       Lab Results   Component Value Date    ALBUMIN 3.2 12/19/2017    PROTTOTAL 7.2 12/19/2017        Lab Results   Component Value Date    WBC 4.6 12/19/2017    HGB 10.4 12/19/2017    MCV 99 12/19/2017    PLT 56 12/19/2017       Lab Results   Component Value Date    INR 1.93 12/19/2017     MELD-Na score: 23 at 12/19/2017  8:17 AM  MELD score: 20 at 12/19/2017  8:17 AM  Calculated from:  Serum Creatinine: 1.09 mg/dL at 12/19/2017  8:17 AM  Serum Sodium: 133 mmol/L at 12/19/2017  8:17 AM  Total Bilirubin: 4.5 mg/dL at 12/19/2017  8:17 AM  INR(ratio): 1.93 at 12/19/2017  8:17 AM  Age: 57 years      Radiology    Assessment and plan:  Mr. Ibarra is a 57-year-old  male from [place] with significant history of alcohol use who develop decompensated cirrhosis with esophageal varices, ascites and hepatic encephalopathy.  For his esophageal varices he is on Inderal and continues that.  For his hepatic encephalopathy, he is on lactulose and he is lucid and clear.  For his ascites he required therapeutic paracentesis on a monthly basis and will require surveillance for HCC and his gastroenterologist is aware of that, but considering his MELD score being above the listing criteria that is the reason why he was referred here.  Please note that  the patient was declined in [place] and the reason was his MELD score was too low.  He has a history of opioid use for quite a long time and the patient will be seen by our  and possibly get a psychosocial assessment and see what is going on with that.  If when he finishes the whole evaluation and he qualifies, then he could be listed for liver transplantation.      This was a 1 hour visit of which more than 50% was spent in explaining to the patient what our plan of care was.  We answered all his questions.      cc:  Primary Care physician     Angelito Dietz MD  Hepatology  [facility]

## 2017-12-19 NOTE — COMMITTEE REVIEW
Abdominal Patient Discussion Note Transplant Coordinator: Rosalba Peres  Transplant Surgeon:   Oumou Pink    Referring Physician: Selena Givens    Committee Review Members:  Dietitian, Wm Jacobs RD   Gastroenterology Yessi Jorge, RN   Hepatology Oscar Ventura MD   Pharmacy Umair Richardson, [facility]    - Clinical Cintia Welch, ASHLEY, Janny Mcclelland, [facility]   Transplant Jozef Andrews MD, Kaitlynn Guadalupe MD, Bailey Osei, APRN CNP, Rosalba Peres, BROOKE, Angelito Dietz MD, Kaitlin Ochoa RN, Toro Cruz MD   Transplant Hepatology  Shaq Larkin MD       Additional Discussion Notes and Findings:      Neuro psych and obtain outside transplant records  Re discuss

## 2017-12-19 NOTE — PROGRESS NOTES
Assessment and Plan:  1. liver transplant evaluation - patient is a good candidate overall. Benefits and surgical risks of a liver transplantation were discussed.  2.  End stage liver disease due to Laennec's    Surgical evaluation:  1. Portal Vein:Patent  2. Hepatic Artery: Open  3. TIPS: absent  4. Previous Abdominal Surgery: Yes - umbilical hernia (does not think there was a mesh)  5. Hepatocellular Carcinoma: None  6. Ascites: Present - moderate  7. Costal Angle: narrow  8. Portopulmonary Hypertension: No PAP read on Echo  9. Hepatopulmonary Syndrome: absent  10. Cardiac Evaluation: need to comment on PAP  11. Nutritional Status: Good  12. Diabetes: none  13.Hypertension none  14. Smoker:no      Recommendations: 1. CD eval; 2. Would consider LD though ABO AB, may get  offer; 3. Needs addendum on Echo for pulm arterial pressures      Patients overall evaluation will be discussed at the Liver Transplant selection committee meeting with a final recommendation on the patients suitability for transplant to be made at that time.    Consult Full  Details:  Charanjit Ibarra was seen in consultation at the request of Dr. Givens for evaluation as a potential liver transplant recipient.    Reason for Visit:  Charanjit Ibarra is a 57 year old year old male with Laennec's, who presents for liver transplant evaluation.    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    No FH of liver dz or cancers    Did not drink for 2 years.    ABO: AB  MELD 23    Pt feels that his liver disease is multifactorial including medicine and ETOH.      PMH:   tobacco only at 15 yo  Right arm injury    PSH: umbilical hernia repair w/out mesh per pt      No family history on file.  Allergies   Allergen Reactions     Aspirin Other (See Comments)     Pt states that NSAIDS  severely lowers his platelets     Nsaids Other (See Comments)     Pt states that NSAIDS severely lowers his platelets     Amoxicillin Swelling     Prior to Admission medications    Medication Sig Start Date End Date Taking? Authorizing Provider   cyclobenzaprine (FLEXERIL) 10 MG tablet Take 1 Tab by mouth 3 times a day as needed for Muscle Spasms. 11/10/17   Reported, Patient   ferrous sulfate (IRON) 325 (65 FE) MG tablet Take 325 mg by mouth 6/27/16   Reported, Patient   furosemide (LASIX) 20 MG tablet Take 60 mg by mouth 8/30/17   Reported, Patient   lactulose (CHRONULAC) 10 GM/15ML solution Take 30 mL by mouth three times daily 8/30/17   Reported, Patient   levothyroxine (SYNTHROID/LEVOTHROID) 50 MCG tablet Take 50 mcg by mouth 3/27/17   Reported, Patient   Magnesium 65 MG TABS Take 64 mg by mouth 8/30/17   Reported, Patient   omeprazole (PRILOSEC OTC) 20 MG tablet Take 20 mg by mouth 8/30/17   Reported, Patient   oxyCODONE IR (ROXICODONE) 15 MG tablet Take 15 mg by mouth 12/4/17 1/3/18  Reported, Patient   propranolol (INDERAL) 10 MG tablet Take 10 mg by mouth 10/23/17   Reported, Patient   spironolactone (ALDACTONE) 50 MG tablet Take 50 mg by mouth 8/30/17   Reported, Patient   rifaximin (XIFAXAN) 550 MG TABS tablet Take 550 mg by mouth 11/30/17   Reported, Patient       Previous Transplant Hx: No    Cardiovascular Hx:       h/o Cardiac Issues: No       Exercise Tolerance: no chest pain or shortness of breath with exertion.    Potential Donor(s): ?    ROS:    REVIEW OF SYSTEMS (check box if normal)  [x]                GENERAL  [x]                  PULMONARY [x]                 GENITOURINARY  [x]                 CNS                 [x]                  CARDIAC  [x]                  ENDOCRINE  [x]                 EARS,NOSE,THROAT [x]                  GASTROINTESTINAL [x]                  NEUROLOGIC    [x]                 MUSCLOSKELTAL  [x]                   HEMATOLOGY    Examination:     Vitals:  There were no  vitals taken for this visit.    GENERAL APPEARANCE: alert and no distress  EYES: PERRL  HENT: mouth without ulcers or lesions  NECK: supple, no adenopathy  RESP: lungs clear to auscultation - no rales, rhonchi or wheezes  CV: regular rhythm, normal rate, no rub   ABDOMEN:  soft, nontender, no HSM or masses and bowel sounds normal  MS: extremities normal- no gross deformities noted, no evidence of inflammation in joints, no muscle tenderness  SKIN: no rash  NEURO: Normal strength and tone, sensory exam grossly normal, mentation intact and speech normal  PSYCH: mentation appears normal. and affect normal/bright      Results:   Recent Results (from the past 168 hour(s))   EKG 12-lead, tracing only [EKG1]    Collection Time: 12/18/17 12:33 PM   Result Value Ref Range    Interpretation ECG Click View Image link to view waveform and result    ABO/Rh type and screen    Collection Time: 12/19/17  8:17 AM   Result Value Ref Range    ABO AB     RH(D) Pos     Antibody Screen Neg     Test Valid Only At          [facility]    Specimen Expires 12/22/2017    Antibody titer red cell    Collection Time: 12/19/17  8:17 AM   Result Value Ref Range    Antibody Titer Not done    Basic metabolic panel    Collection Time: 12/19/17  8:17 AM   Result Value Ref Range    Sodium 133 133 - 144 mmol/L    Potassium 4.3 3.4 - 5.3 mmol/L    Chloride 97 94 - 109 mmol/L    Carbon Dioxide 27 20 - 32 mmol/L    Anion Gap 9 3 - 14 mmol/L    Glucose 136 (H) 70 - 99 mg/dL    Urea Nitrogen 14 7 - 30 mg/dL    Creatinine 1.09 0.66 - 1.25 mg/dL    GFR Estimate 69 >60 mL/min/1.7m2    GFR Estimate If Black 84 >60 mL/min/1.7m2    Calcium 8.6 8.5 - 10.1 mg/dL   Hepatic panel    Collection Time: 12/19/17  8:17 AM   Result Value Ref Range    Bilirubin Direct 1.3 (H) 0.0 - 0.2 mg/dL    Bilirubin Total 4.5 (H) 0.2 - 1.3 mg/dL    Albumin 3.2 (L) 3.4 - 5.0 g/dL    Protein Total 7.2 6.8 - 8.8 g/dL    Alkaline Phosphatase 144  40 - 150 U/L    ALT 17 0 - 70 U/L    AST 26 0 - 45 U/L   AFP tumor marker    Collection Time: 12/19/17  8:17 AM   Result Value Ref Range    Alpha Fetoprotein 4.3 0 - 8 ug/L   Iron and iron binding capacity    Collection Time: 12/19/17  8:17 AM   Result Value Ref Range    Iron 76 35 - 180 ug/dL    Iron Binding Cap 219 (L) 240 - 430 ug/dL    Iron Saturation Index 35 15 - 46 %   Phosphorus    Collection Time: 12/19/17  8:17 AM   Result Value Ref Range    Phosphorus 4.4 2.5 - 4.5 mg/dL   Prostate spec antigen screen    Collection Time: 12/19/17  8:17 AM   Result Value Ref Range    PSA 0.06 0 - 4 ug/L   Transferrin    Collection Time: 12/19/17  8:17 AM   Result Value Ref Range    Transferrin 192 (L) 210 - 360 mg/dL   TSH with free T4 reflex    Collection Time: 12/19/17  8:17 AM   Result Value Ref Range    TSH 3.17 0.40 - 4.00 mU/L   INR    Collection Time: 12/19/17  8:17 AM   Result Value Ref Range    INR 1.93 (H) 0.86 - 1.14   CBC with platelets    Collection Time: 12/19/17  8:17 AM   Result Value Ref Range    WBC 4.6 4.0 - 11.0 10e9/L    RBC Count 3.05 (L) 4.4 - 5.9 10e12/L    Hemoglobin 10.4 (L) 13.3 - 17.7 g/dL    Hematocrit 30.1 (L) 40.0 - 53.0 %    MCV 99 78 - 100 fl    MCH 34.1 (H) 26.5 - 33.0 pg    MCHC 34.6 31.5 - 36.5 g/dL    RDW 14.2 10.0 - 15.0 %    Platelet Count 56 (L) 150 - 450 10e9/L   CMV Antibody IgG    Collection Time: 12/19/17  8:17 AM   Result Value Ref Range    CMV Antibody IgG >8.0 (H) 0.0 - 0.8 AI   EBV Capsid Antibody IgG    Collection Time: 12/19/17  8:17 AM   Result Value Ref Range    EBV Capsid Antibody IgG >8.0 (H) 0.0 - 0.8 AI   Anti Treponema    Collection Time: 12/19/17  8:17 AM   Result Value Ref Range    Treponema pallidum Antibody Negative NEG^Negative   ABO type [IER8259]    Collection Time: 12/19/17  8:21 AM   Result Value Ref Range    ABO AB     RH(D) Pos     Specimen Expires 12/22/2017    Protein  random urine with Creat Ratio    Collection Time: 12/19/17  9:06 AM   Result Value Ref  Range    Protein Random Urine <0.05 g/L    Protein Total Urine g/gr Creatinine Unable to calculate due to low value 0 - 0.2 g/g Cr   UA with Microscopic reflex to Culture    Collection Time: 12/19/17  9:06 AM   Result Value Ref Range    Color Urine Yellow     Appearance Urine Clear     Glucose Urine Negative NEG^Negative mg/dL    Bilirubin Urine Negative NEG^Negative    Ketones Urine Negative NEG^Negative mg/dL    Specific Gravity Urine 1.005 1.003 - 1.035    Blood Urine Negative NEG^Negative    pH Urine 6.0 5.0 - 7.0 pH    Protein Albumin Urine Negative NEG^Negative mg/dL    Urobilinogen mg/dL 2.0 0.0 - 2.0 mg/dL    Nitrite Urine Negative NEG^Negative    Leukocyte Esterase Urine Negative NEG^Negative    Source Unspecified Urine     WBC Urine <1 0 - 2 /HPF    RBC Urine 1 0 - 2 /HPF    Mucous Urine Present (A) NEG^Negative /LPF   Creatinine urine calculation only    Collection Time: 12/19/17  9:06 AM   Result Value Ref Range    Creatinine Urine 18 mg/dL     I had a long discussion with the patient regarding liver transplantation which included but was not limited to  the following points:    1. Liver transplant selection committee process.  2. The federal rules for cadaveric waiting list, the size and blood type matching of the organ. The availability of living-related donor transplantation.  3. The types of donors: brain death donors, non-heart beating donors, partial liver grafts: splits and living donor grafts  4. Extended criteria  Donors (older age, steasosis) and the increased  risk of primary non-function using the extended criteria donors  5. The CDC high risk donors,  Risk of donor transmitted infections and donor transmitted malignancy  6. The liver transplant operation and the associated risks and technical complications which can include intraoperative death, post operative death,  Primary non-function, bleeding requiring re-operations, arterial and biliary complications, bowel perforations, and intra  abdominal abscess. Some of these complicaitons may require a second operation.  7. The postoperative course, the ICU stay and risk of postoperative complications which can include sepsis, MI, stroke, brain injury, pneumonia, pleural effusions, and renal dysfunction.  8. The current 1 year and 5 year graft and patient survivals.  9. The need for life long immunosuppressive therapy and the side effects of these medications, including the possibility of toxicity, opportunistic infections, risk of cancer including lymphoma, and the possibility of rejection even if the patient is taking the medication exactly as prescribed.  10. The need for compliance with medications and follow-up visits in the clinic and thereafter.  11. The patient and family understand these risks and wish to proceed to transplantation       I spent 60 minutes with the patient and more than 50% of the time was spend in direct face to face counseling.

## 2017-12-19 NOTE — MR AVS SNAPSHOT
After Visit Summary   12/19/2017    Charanjit Ibarra    MRN: [id number]           Patient Information     Date Of Birth          1960        Visit Information        Provider Department      12/19/2017 12:00 PM Janny Mcclelland LICSW [facility] Solid Organ Transplant        Today's Diagnoses     Organ transplant candidate    -  1       Follow-ups after your visit        Your next 10 appointments already scheduled     Jan 29, 2018 12:30 PM CST   (Arrive by 12:15 PM)   New Patient Visit with Vanesa Frey PsyD   [facility] Neuropsychology ([facility])    [address]  3rd Floor  [facility] [id number] [phone]            Jan 29, 2018  4:30 PM CST   (Arrive by 4:15 PM)   NEW LIVER EVALUATION with Medardo Batres MD   [facility] ([facility])    [address] [place] [address]  [address]  [facility] [id number] [phone]              Who to contact     If you have questions or need follow up information about today's clinic visit or your schedule please contact [facility]ID [facility]GAN TRANSPLANT directly at [phone].  Normal or non-critical lab and imaging results will be communicated to you by [employer], letter or phone within 4 business days after the clinic has received the results. If you do not hear from us within 7 days, please contact the clinic through [employer] or phone. If you have a critical or abnormal lab result, we will notify you by phone as soon as possible.  Submit refill requests through [employer] or call your pharmacy and they will forward the refill request to us. Please allow 3 business days for your refill to be completed.          Additional Information About Your Visit        MyChart Information     [employer] gives you secure access to your electronic health record. If you see a primary care provider, you can also send messages to your care team and make appointments. If you have questions, please call  your primary care clinic.  If you do not have a primary care provider, please call [phone] and they will assist you.        Care EveryWhere ID     This is your Care EveryWhere ID. This could be used by other organizations to access your [place] medical records  [id number]         Blood Pressure from Last 3 Encounters:   12/19/17 131/80    Weight from Last 3 Encounters:   12/19/17 83.4 kg (183 lb 14.4 oz)   11/08/17 79.4 kg (175 lb)              Today, you had the following     No orders found for display       Primary Care Provider Fax #    Physician No Ref-Primary [phone]       No address on file        Equal Access to Services     [facility] : Hadii ryley Hart, wawendyda belen, pina kaalmada anju, lena srivastava . So [facility] [phone].    ATENCIÓN: Si habla español, tiene a hall disposición servicios gratuitos de asistencia lingüística. Llame al [phone].    We comply with applicable federal civil rights laws and [place] laws. We do not discriminate on the basis of race, color, national origin, age, disability, sex, sexual orientation, or gender identity.            Thank you!     Thank you for choosing [facility]GAN TRANSPL[facility]  for your care. Our goal is always to provide you with excellent care. Hearing back from our patients is one way we can continue to improve our services. Please take a few minutes to complete the written survey that you may receive in the mail after your visit with us. Thank you!             Your Updated Medication List - Protect others around you: Learn how to safely use, store and throw away your medicines at www.disposemymeds.org.          This list is accurate as of: 12/19/17 11:59 PM.  Always use your most recent med list.                   Brand Name Dispense Instructions for use Diagnosis    cyclobenzaprine 10 MG tablet    FLEXERIL     Take 1 Tab by mouth 3 times a day as needed for Muscle Spasms.        ferrous  sulfate 325 (65 FE) MG tablet    IRON     Take 325 mg by mouth        furosemide 20 MG tablet    LASIX     Take 60 mg by mouth        lactulose 10 GM/15ML solution    CHRONULAC     Take 30 mL by mouth three times daily        levothyroxine 50 MCG tablet    SYNTHROID/LEVOTHROID     Take 50 mcg by mouth        Magnesium 65 MG Tabs      Take 64 mg by mouth        omeprazole 20 MG tablet    priLOSEC OTC     Take 20 mg by mouth        oxyCODONE IR 15 MG tablet    ROXICODONE     Take 15 mg by mouth        propranolol 10 MG tablet    INDERAL     Take 10 mg by mouth        spironolactone 50 MG tablet    ALDACTONE     Take 50 mg by mouth        XIFAXAN 550 MG Tabs tablet   Generic drug:  rifaximin      Take 550 mg by mouth

## 2017-12-19 NOTE — LETTER
2017       RE: Charanjit Ibarra  [address]  [place] [address]     Dear Colleague,    Thank you for referring your patient, Charanjit Ibarra, to the [facility] SOLID ORGAN TRANSPLANT at [facility]. Please see a copy of my visit note below.    Assessment and Plan:  1. liver transplant evaluation - patient is a good candidate overall. Benefits and surgical risks of a liver transplantation were discussed.  2.  End stage liver disease due to Laennec's    Surgical evaluation:  1. Portal Vein:Patent  2. Hepatic Artery: Open  3. TIPS: absent  4. Previous Abdominal Surgery: Yes - umbilical hernia (does not think there was a mesh)  5. Hepatocellular Carcinoma: None  6. Ascites: Present - moderate  7. Costal Angle: narrow  8. Portopulmonary Hypertension: No PAP read on Echo  9. Hepatopulmonary Syndrome: absent  10. Cardiac Evaluation: need to comment on PAP  11. Nutritional Status: Good  12. Diabetes: none  13.Hypertension none  14. Smoker:no      Recommendations: 1. CD eval; 2. Would consider LD though ABO AB, may get  offer; 3. Needs addendum on Echo for pulm arterial pressures      Patients overall evaluation will be discussed at the Liver Transplant selection committee meeting with a final recommendation on the patients suitability for transplant to be made at that time.    Consult Full  Details:  Charanjit Ibarra was seen in consultation at the request of Dr. Givens for evaluation as a potential liver transplant recipient.    Reason for Visit:  Charanjit Ibarra is a 57 year old year old male with Laennec's, who presents for liver transplant evaluation.    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    No FH of liver dz or cancers    Did not drink for 2 years.    ABO: AB  MELD 23    Pt feels  that his liver disease is multifactorial including medicine and ETOH.      PMH:   tobacco only at 15 yo  Right arm injury    PSH: umbilical hernia repair w/out mesh per pt      No family history on file.  Allergies   Allergen Reactions     Aspirin Other (See Comments)     Pt states that NSAIDS severely lowers his platelets     Nsaids Other (See Comments)     Pt states that NSAIDS severely lowers his platelets     Amoxicillin Swelling     Prior to Admission medications    Medication Sig Start Date End Date Taking? Authorizing Provider   cyclobenzaprine (FLEXERIL) 10 MG tablet Take 1 Tab by mouth 3 times a day as needed for Muscle Spasms. 11/10/17   Reported, Patient   ferrous sulfate (IRON) 325 (65 FE) MG tablet Take 325 mg by mouth 6/27/16   Reported, Patient   furosemide (LASIX) 20 MG tablet Take 60 mg by mouth 8/30/17   Reported, Patient   lactulose (CHRONULAC) 10 GM/15ML solution Take 30 mL by mouth three times daily 8/30/17   Reported, Patient   levothyroxine (SYNTHROID/LEVOTHROID) 50 MCG tablet Take 50 mcg by mouth 3/27/17   Reported, Patient   Magnesium 65 MG TABS Take 64 mg by mouth 8/30/17   Reported, Patient   omeprazole (PRILOSEC OTC) 20 MG tablet Take 20 mg by mouth 8/30/17   Reported, Patient   oxyCODONE IR (ROXICODONE) 15 MG tablet Take 15 mg by mouth 12/4/17 1/3/18  Reported, Patient   propranolol (INDERAL) 10 MG tablet Take 10 mg by mouth 10/23/17   Reported, Patient   spironolactone (ALDACTONE) 50 MG tablet Take 50 mg by mouth 8/30/17   Reported, Patient   rifaximin (XIFAXAN) 550 MG TABS tablet Take 550 mg by mouth 11/30/17   Reported, Patient       Previous Transplant Hx: No    Cardiovascular Hx:       h/o Cardiac Issues: No       Exercise Tolerance: no chest pain or shortness of breath with exertion.    Potential Donor(s): ?    ROS:    REVIEW OF SYSTEMS (check box if normal)  [x]                GENERAL  [x]                  PULMONARY [x]                 GENITOURINARY  [x]                 CNS                  [x]                  CARDIAC  [x]                  ENDOCRINE  [x]                 EARS,NOSE,THROAT [x]                  GASTROINTESTINAL [x]                  NEUROLOGIC    [x]                 MUSCLOSKELTAL  [x]                   HEMATOLOGY    Examination:     Vitals:  There were no vitals taken for this visit.    GENERAL APPEARANCE: alert and no distress  EYES: PERRL  HENT: mouth without ulcers or lesions  NECK: supple, no adenopathy  RESP: lungs clear to auscultation - no rales, rhonchi or wheezes  CV: regular rhythm, normal rate, no rub   ABDOMEN:  soft, nontender, no HSM or masses and bowel sounds normal  MS: extremities normal- no gross deformities noted, no evidence of inflammation in joints, no muscle tenderness  SKIN: no rash  NEURO: Normal strength and tone, sensory exam grossly normal, mentation intact and speech normal  PSYCH: mentation appears normal. and affect normal/bright      Results:   Recent Results (from the past 168 hour(s))   EKG 12-lead, tracing only [EKG1]    Collection Time: 12/18/17 12:33 PM   Result Value Ref Range    Interpretation ECG Click View Image link to view waveform and result    ABO/Rh type and screen    Collection Time: 12/19/17  8:17 AM   Result Value Ref Range    ABO AB     RH(D) Pos     Antibody Screen Neg     Test Valid Only At          [facility]    Specimen Expires 12/22/2017    Antibody titer red cell    Collection Time: 12/19/17  8:17 AM   Result Value Ref Range    Antibody Titer Not done    Basic metabolic panel    Collection Time: 12/19/17  8:17 AM   Result Value Ref Range    Sodium 133 133 - 144 mmol/L    Potassium 4.3 3.4 - 5.3 mmol/L    Chloride 97 94 - 109 mmol/L    Carbon Dioxide 27 20 - 32 mmol/L    Anion Gap 9 3 - 14 mmol/L    Glucose 136 (H) 70 - 99 mg/dL    Urea Nitrogen 14 7 - 30 mg/dL    Creatinine 1.09 0.66 - 1.25 mg/dL    GFR Estimate 69 >60 mL/min/1.7m2    GFR Estimate If Black 84 >60 mL/min/1.7m2     Calcium 8.6 8.5 - 10.1 mg/dL   Hepatic panel    Collection Time: 12/19/17  8:17 AM   Result Value Ref Range    Bilirubin Direct 1.3 (H) 0.0 - 0.2 mg/dL    Bilirubin Total 4.5 (H) 0.2 - 1.3 mg/dL    Albumin 3.2 (L) 3.4 - 5.0 g/dL    Protein Total 7.2 6.8 - 8.8 g/dL    Alkaline Phosphatase 144 40 - 150 U/L    ALT 17 0 - 70 U/L    AST 26 0 - 45 U/L   AFP tumor marker    Collection Time: 12/19/17  8:17 AM   Result Value Ref Range    Alpha Fetoprotein 4.3 0 - 8 ug/L   Iron and iron binding capacity    Collection Time: 12/19/17  8:17 AM   Result Value Ref Range    Iron 76 35 - 180 ug/dL    Iron Binding Cap 219 (L) 240 - 430 ug/dL    Iron Saturation Index 35 15 - 46 %   Phosphorus    Collection Time: 12/19/17  8:17 AM   Result Value Ref Range    Phosphorus 4.4 2.5 - 4.5 mg/dL   Prostate spec antigen screen    Collection Time: 12/19/17  8:17 AM   Result Value Ref Range    PSA 0.06 0 - 4 ug/L   Transferrin    Collection Time: 12/19/17  8:17 AM   Result Value Ref Range    Transferrin 192 (L) 210 - 360 mg/dL   TSH with free T4 reflex    Collection Time: 12/19/17  8:17 AM   Result Value Ref Range    TSH 3.17 0.40 - 4.00 mU/L   INR    Collection Time: 12/19/17  8:17 AM   Result Value Ref Range    INR 1.93 (H) 0.86 - 1.14   CBC with platelets    Collection Time: 12/19/17  8:17 AM   Result Value Ref Range    WBC 4.6 4.0 - 11.0 10e9/L    RBC Count 3.05 (L) 4.4 - 5.9 10e12/L    Hemoglobin 10.4 (L) 13.3 - 17.7 g/dL    Hematocrit 30.1 (L) 40.0 - 53.0 %    MCV 99 78 - 100 fl    MCH 34.1 (H) 26.5 - 33.0 pg    MCHC 34.6 31.5 - 36.5 g/dL    RDW 14.2 10.0 - 15.0 %    Platelet Count 56 (L) 150 - 450 10e9/L   CMV Antibody IgG    Collection Time: 12/19/17  8:17 AM   Result Value Ref Range    CMV Antibody IgG >8.0 (H) 0.0 - 0.8 AI   EBV Capsid Antibody IgG    Collection Time: 12/19/17  8:17 AM   Result Value Ref Range    EBV Capsid Antibody IgG >8.0 (H) 0.0 - 0.8 AI   Anti Treponema    Collection Time: 12/19/17  8:17 AM   Result Value Ref  Range    Treponema pallidum Antibody Negative NEG^Negative   ABO type [OOZ1320]    Collection Time: 12/19/17  8:21 AM   Result Value Ref Range    ABO AB     RH(D) Pos     Specimen Expires 12/22/2017    Protein  random urine with Creat Ratio    Collection Time: 12/19/17  9:06 AM   Result Value Ref Range    Protein Random Urine <0.05 g/L    Protein Total Urine g/gr Creatinine Unable to calculate due to low value 0 - 0.2 g/g Cr   UA with Microscopic reflex to Culture    Collection Time: 12/19/17  9:06 AM   Result Value Ref Range    Color Urine Yellow     Appearance Urine Clear     Glucose Urine Negative NEG^Negative mg/dL    Bilirubin Urine Negative NEG^Negative    Ketones Urine Negative NEG^Negative mg/dL    Specific Gravity Urine 1.005 1.003 - 1.035    Blood Urine Negative NEG^Negative    pH Urine 6.0 5.0 - 7.0 pH    Protein Albumin Urine Negative NEG^Negative mg/dL    Urobilinogen mg/dL 2.0 0.0 - 2.0 mg/dL    Nitrite Urine Negative NEG^Negative    Leukocyte Esterase Urine Negative NEG^Negative    Source Unspecified Urine     WBC Urine <1 0 - 2 /HPF    RBC Urine 1 0 - 2 /HPF    Mucous Urine Present (A) NEG^Negative /LPF   Creatinine urine calculation only    Collection Time: 12/19/17  9:06 AM   Result Value Ref Range    Creatinine Urine 18 mg/dL     I had a long discussion with the patient regarding liver transplantation which included but was not limited to  the following points:    1. Liver transplant selection committee process.  2. The federal rules for cadaveric waiting list, the size and blood type matching of the organ. The availability of living-related donor transplantation.  3. The types of donors: brain death donors, non-heart beating donors, partial liver grafts: splits and living donor grafts  4. Extended criteria  Donors (older age, steasosis) and the increased  risk of primary non-function using the extended criteria donors  5. The CDC high risk donors,  Risk of donor transmitted infections and donor  transmitted malignancy  6. The liver transplant operation and the associated risks and technical complications which can include intraoperative death, post operative death,  Primary non-function, bleeding requiring re-operations, arterial and biliary complications, bowel perforations, and intra abdominal abscess. Some of these complicaitons may require a second operation.  7. The postoperative course, the ICU stay and risk of postoperative complications which can include sepsis, MI, stroke, brain injury, pneumonia, pleural effusions, and renal dysfunction.  8. The current 1 year and 5 year graft and patient survivals.  9. The need for life long immunosuppressive therapy and the side effects of these medications, including the possibility of toxicity, opportunistic infections, risk of cancer including lymphoma, and the possibility of rejection even if the patient is taking the medication exactly as prescribed.  10. The need for compliance with medications and follow-up visits in the clinic and thereafter.  11. The patient and family understand these risks and wish to proceed to transplantation       I spent 60 minutes with the patient and more than 50% of the time was spend in direct face to face counseling.      Again, thank you for allowing me to participate in the care of your patient.      Sincerely,    Oumou Pink MD

## 2017-12-19 NOTE — MR AVS SNAPSHOT
After Visit Summary   12/19/2017    Charanjit Ibarra    MRN: [id number]           Patient Information     Date Of Birth          1960        Visit Information        Provider Department      12/19/2017 9:00 AM  TXC EVALUATION [facility] Solid Organ Transplant        Today's Diagnoses     Pre-transplant evaluation for liver transplant    -  1       Follow-ups after your visit        Your next 10 appointments already scheduled     Jan 16, 2018  2:30 PM CST   (Arrive by 2:15 PM)   NEW LIVER EVALUATION with STEPHANY Montero MD   [facility] ([facility])    [address] [place] [id number] [phone]              Future tests that were ordered for you today     Open Future Orders        Priority Expected Expires Ordered    ECHO STRESS DOBUTAMINE WITH OPTISON Routine  11/13/2018 11/13/2017            Who to contact     If you have questions or need follow up information about today's clinic visit or your schedule please contact [facility] SOLID ORGAN TRANSPLANT directly at [phone].  Normal or non-critical lab and imaging results will be communicated to you by [employer], letter or phone within 4 business days after the clinic has received the results. If you do not hear from us within 7 days, please contact the clinic through [employer] or phone. If you have a critical or abnormal lab result, we will notify you by phone as soon as possible.  Submit refill requests through [employer] or call your pharmacy and they will forward the refill request to us. Please allow 3 business days for your refill to be completed.          Additional Information About Your Visit        [employer] Information     [employer] gives you secure access to your electronic health record. If you see a primary care provider, you can also send messages to your care team and make appointments. If you have questions, please call your primary care clinic.  If you do not have a  "primary care provider, please call [phone] and they will assist you.        Care EveryWhere ID     This is your Care EveryWhere ID. This could be used by other organizations to access your [place] medical records  [id number]        Your Vitals Were     Pulse Temperature Respirations Height Pulse Oximetry BMI (Body Mass Index)    93 98.2  F (36.8  C) 18 1.727 m (5' 8\") 99% 27.96 kg/m2       Blood Pressure from Last 3 Encounters:   12/19/17 131/80    Weight from Last 3 Encounters:   12/19/17 83.4 kg (183 lb 14.4 oz)   11/08/17 79.4 kg (175 lb)              Today, you had the following     No orders found for display       Primary Care Provider Fax #    Physician No Ref-Primary [phone]       No address on file        Equal Access to Services     [facility] ALISSA : Sonja Hart, waaris glover, pina davidalclaudia rene, lena srivastava . So [facility] [phone].    ATENCIÓN: Si habla español, tiene a hall disposición servicios gratuitos de asistencia lingüística. Kem al [phone].    We comply with applicable federal civil rights laws and [place] laws. We do not discriminate on the basis of race, color, national origin, age, disability, sex, sexual orientation, or gender identity.            Thank you!     Thank you for choosing [facility] SOLID ORGAN TRANSPLANT  for your care. Our goal is always to provide you with excellent care. Hearing back from our patients is one way we can continue to improve our services. Please take a few minutes to complete the written survey that you may receive in the mail after your visit with us. Thank you!             Your Updated Medication List - Protect others around you: Learn how to safely use, store and throw away your medicines at www.disposemymeds.org.          This list is accurate as of: 12/19/17 12:38 PM.  Always use your most recent med list.                   Brand Name Dispense Instructions for use Diagnosis    " cyclobenzaprine 10 MG tablet    FLEXERIL     Take 1 Tab by mouth 3 times a day as needed for Muscle Spasms.        ferrous sulfate 325 (65 FE) MG tablet    IRON     Take 325 mg by mouth        furosemide 20 MG tablet    LASIX     Take 60 mg by mouth        lactulose 10 GM/15ML solution    CHRONULAC     Take 30 mL by mouth three times daily        levothyroxine 50 MCG tablet    SYNTHROID/LEVOTHROID     Take 50 mcg by mouth        Magnesium 65 MG Tabs      Take 64 mg by mouth        omeprazole 20 MG tablet    priLOSEC OTC     Take 20 mg by mouth        oxyCODONE IR 15 MG tablet    ROXICODONE     Take 15 mg by mouth        propranolol 10 MG tablet    INDERAL     Take 10 mg by mouth        spironolactone 50 MG tablet    ALDACTONE     Take 50 mg by mouth        XIFAXAN 550 MG Tabs tablet   Generic drug:  rifaximin      Take 550 mg by mouth

## 2017-12-19 NOTE — MR AVS SNAPSHOT
After Visit Summary   12/19/2017    Charanjit Ibarra    MRN: [id number]           Patient Information     Date Of Birth          1960        Visit Information        Provider Department      12/19/2017 9:30 AM Oumou Pink MD [facility] Solid Organ Transplant        Today's Diagnoses     Alcoholic cirrhosis of liver with ascites (H)    -  1       Follow-ups after your visit        Your next 10 appointments already scheduled     Jan 16, 2018  2:30 PM CST   (Arrive by 2:15 PM)   NEW LIVER EVALUATION with STEPHANY Montero MD   [facility] ([facility])    [address] [place] [address]  3rd [facility] [id number] [phone]              Future tests that were ordered for you today     Open Future Orders        Priority Expected Expires Ordered    ECHO STRESS DOBUTAMINE WITH OPTISON Routine  11/13/2018 11/13/2017            Who to contact     If you have questions or need follow up information about today's clinic visit or your schedule please contact [facility] SOLID ORGAN TRANSPLANT directly at [phone].  Normal or non-critical lab and imaging results will be communicated to you by [employer], letter or phone within 4 business days after the clinic has received the results. If you do not hear from us within 7 days, please contact the clinic through [employer] or phone. If you have a critical or abnormal lab result, we will notify you by phone as soon as possible.  Submit refill requests through [employer] or call your pharmacy and they will forward the refill request to us. Please allow 3 business days for your refill to be completed.          Additional Information About Your Visit        [employer] Information     [employer] gives you secure access to your electronic health record. If you see a primary care provider, you can also send messages to your care team and make appointments. If you have questions, please call your primary care clinic.  If you do not have a  primary care provider, please call [phone] and they will assist you.        Care EveryWhere ID     This is your Care EveryWhere ID. This could be used by other organizations to access your [place] medical records  [id number]         Blood Pressure from Last 3 Encounters:   12/19/17 131/80    Weight from Last 3 Encounters:   12/19/17 83.4 kg (183 lb 14.4 oz)   11/08/17 79.4 kg (175 lb)              Today, you had the following     No orders found for display       Primary Care Provider Fax #    Physician No Ref-Primary [phone]       No address on file        Equal Access to Services     [facility]: Hadii ryley Hart, wawendyda belen, qaybta kaalmada anju, lena srivastava . So [facility] [phone].    ATENCIÓN: Si habla español, tiene a hall disposición servicios gratuitos de asistencia lingüística. Llame al [phone].    We comply with applicable federal civil rights laws and [place] laws. We do not discriminate on the basis of race, color, national origin, age, disability, sex, sexual orientation, or gender identity.            Thank you!     Thank you for choosing [facility] SOLID ORGAN TRANSPLANT  for your care. Our goal is always to provide you with excellent care. Hearing back from our patients is one way we can continue to improve our services. Please take a few minutes to complete the written survey that you may receive in the mail after your visit with us. Thank you!             Your Updated Medication List - Protect others around you: Learn how to safely use, store and throw away your medicines at www.disposemymeds.org.          This list is accurate as of: 12/19/17 12:46 PM.  Always use your most recent med list.                   Brand Name Dispense Instructions for use Diagnosis    cyclobenzaprine 10 MG tablet    FLEXERIL     Take 1 Tab by mouth 3 times a day as needed for Muscle Spasms.        ferrous sulfate 325 (65 FE) MG tablet    IRON     Take  325 mg by mouth        furosemide 20 MG tablet    LASIX     Take 60 mg by mouth        lactulose 10 GM/15ML solution    CHRONULAC     Take 30 mL by mouth three times daily        levothyroxine 50 MCG tablet    SYNTHROID/LEVOTHROID     Take 50 mcg by mouth        Magnesium 65 MG Tabs      Take 64 mg by mouth        omeprazole 20 MG tablet    priLOSEC OTC     Take 20 mg by mouth        oxyCODONE IR 15 MG tablet    ROXICODONE     Take 15 mg by mouth        propranolol 10 MG tablet    INDERAL     Take 10 mg by mouth        spironolactone 50 MG tablet    ALDACTONE     Take 50 mg by mouth        XIFAXAN 550 MG Tabs tablet   Generic drug:  rifaximin      Take 550 mg by mouth

## 2017-12-19 NOTE — MR AVS SNAPSHOT
After Visit Summary   12/19/2017    Charanjit Ibarra    MRN: [id number]           Patient Information     Date Of Birth          1960        Visit Information        Provider Department      12/19/2017 11:00 AM Angelito Dietz MD [facility] Hepatology        Today's Diagnoses     Alcoholic cirrhosis of liver with ascites (H)    -  1       Follow-ups after your visit        Your next 10 appointments already scheduled     Jan 16, 2018  2:30 PM CST   (Arrive by 2:15 PM)   NEW LIVER EVALUATION with STEPHANY Montero MD   [facility] ([facility])    [address]  [address] [place] [address] [phone]              Who to contact     If you have questions or need follow up information about today's clinic visit or your schedule please contact [facility] HEPATOLOGY directly at [phone].  Normal or non-critical lab and imaging results will be communicated to you by MyChart, letter or phone within 4 business days after the clinic has received the results. If you do not hear from us within 7 days, please contact the clinic through [employer] or phone. If you have a critical or abnormal lab result, we will notify you by phone as soon as possible.  Submit refill requests through [employer] or call your pharmacy and they will forward the refill request to us. Please allow 3 business days for your refill to be completed.          Additional Information About Your Visit        MyChart Information     [employer] gives you secure access to your electronic health record. If you see a primary care provider, you can also send messages to your care team and make appointments. If you have questions, please call your primary care clinic.  If you do not have a primary care provider, please call [phone] and they will assist you.        Care EveryWhere ID     This is your Care EveryWhere ID. This could be used by other organizations to access your [place]  medical records  [id number]         Blood Pressure from Last 3 Encounters:   12/19/17 131/80    Weight from Last 3 Encounters:   12/19/17 83.4 kg (183 lb 14.4 oz)   11/08/17 79.4 kg (175 lb)              Today, you had the following     No orders found for display       Primary Care Provider Fax #    Physician No Ref-Primary [phone]       No address on file        Equal Access to Services     FABIO MAXWELL : Hadii aad ku hadasho Soomaali, waaxda luqadaha, qaybta kaalmada adeegyada, waxbettina adrianin ceciln adeaudrey hayes ladaianalore . So [facility] [phone].    ATENCIÓN: Si habla español, tiene a hall disposición servicios gratuitos de asistencia lingüística. Llame al [phone].    We comply with applicable federal civil rights laws and [place] laws. We do not discriminate on the basis of race, color, national origin, age, disability, sex, sexual orientation, or gender identity.            Thank you!     Thank you for choosing [facility] HEPATOLOGY  for your care. Our goal is always to provide you with excellent care. Hearing back from our patients is one way we can continue to improve our services. Please take a few minutes to complete the written survey that you may receive in the mail after your visit with us. Thank you!             Your Updated Medication List - Protect others around you: Learn how to safely use, store and throw away your medicines at www.disposemymeds.org.          This list is accurate as of: 12/19/17 11:59 PM.  Always use your most recent med list.                   Brand Name Dispense Instructions for use Diagnosis    cyclobenzaprine 10 MG tablet    FLEXERIL     Take 1 Tab by mouth 3 times a day as needed for Muscle Spasms.        ferrous sulfate 325 (65 FE) MG tablet    IRON     Take 325 mg by mouth        furosemide 20 MG tablet    LASIX     Take 60 mg by mouth        lactulose 10 GM/15ML solution    CHRONULAC     Take 30 mL by mouth three times daily        levothyroxine 50 MCG tablet     SYNTHROID/LEVOTHROID     Take 50 mcg by mouth        Magnesium 65 MG Tabs      Take 64 mg by mouth        omeprazole 20 MG tablet    priLOSEC OTC     Take 20 mg by mouth        oxyCODONE IR 15 MG tablet    ROXICODONE     Take 15 mg by mouth        propranolol 10 MG tablet    INDERAL     Take 10 mg by mouth        spironolactone 50 MG tablet    ALDACTONE     Take 50 mg by mouth        XIFAXAN 550 MG Tabs tablet   Generic drug:  rifaximin      Take 550 mg by mouth

## 2017-12-19 NOTE — LETTER
12/19/2017       RE: Charanjit Ibarra  [address]  [place] [address]     Dear Colleague,    Thank you for referring your patient, Charanjit Ibarra, to the [facility] HEPATOLOGY at [facility]. Please see a copy of my visit note below.    [facility]    Hepatology follow-up    Follow-up visit for    Subjective:  57 year old male    Patient denies jaundice, lower extremity edema, abdominal distension, lethargy or confusion.    Patient denies melena, hematemesis or hematochezia.    Patient denies fevers, sweats or chills.  Weight stable.        Medical hx Surgical hx   No past medical history on file.   No past surgical history on file.       Medications  Prior to Admission medications    Medication Sig Start Date End Date Taking? Authorizing Provider   cyclobenzaprine (FLEXERIL) 10 MG tablet Take 1 Tab by mouth 3 times a day as needed for Muscle Spasms. 11/10/17   Reported, Patient   ferrous sulfate (IRON) 325 (65 FE) MG tablet Take 325 mg by mouth 6/27/16   Reported, Patient   furosemide (LASIX) 20 MG tablet Take 60 mg by mouth 8/30/17   Reported, Patient   lactulose (CHRONULAC) 10 GM/15ML solution Take 30 mL by mouth three times daily 8/30/17   Reported, Patient   levothyroxine (SYNTHROID/LEVOTHROID) 50 MCG tablet Take 50 mcg by mouth 3/27/17   Reported, Patient   Magnesium 65 MG TABS Take 64 mg by mouth 8/30/17   Reported, Patient   omeprazole (PRILOSEC OTC) 20 MG tablet Take 20 mg by mouth 8/30/17   Reported, Patient   oxyCODONE IR (ROXICODONE) 15 MG tablet Take 15 mg by mouth 12/4/17 1/3/18  Reported, Patient   propranolol (INDERAL) 10 MG tablet Take 10 mg by mouth 10/23/17   Reported, Patient   spironolactone (ALDACTONE) 50 MG tablet Take 50 mg by mouth 8/30/17   Reported, Patient   rifaximin (XIFAXAN) 550 MG TABS tablet Take 550 mg by mouth 11/30/17   Reported, Patient       Allergies  Allergies   Allergen Reactions     Aspirin Other (See  Comments)     Pt states that NSAIDS severely lowers his platelets     Nsaids Other (See Comments)     Pt states that NSAIDS severely lowers his platelets     Amoxicillin Swelling       Review of systems  A 10-point review of systems was negative    Examination  There were no vitals taken for this visit.  There is no height or weight on file to calculate BMI.    Gen- well, NAD, A+Ox3, normal color  Lym- no palpable LAD  CVS- RRR  RS- CTA  Abd-   Extr- hands normal, no TIMOTHY  Skin- no rash or jaundice  Neuro- no asterixis  Psych- normal mood    Laboratory  Lab Results   Component Value Date     12/19/2017    POTASSIUM 4.3 12/19/2017    CHLORIDE 97 12/19/2017    CO2 27 12/19/2017    BUN 14 12/19/2017    CR 1.09 12/19/2017       Lab Results   Component Value Date    BILITOTAL 4.5 12/19/2017    ALT 17 12/19/2017    AST 26 12/19/2017    ALKPHOS 144 12/19/2017       Lab Results   Component Value Date    ALBUMIN 3.2 12/19/2017    PROTTOTAL 7.2 12/19/2017        Lab Results   Component Value Date    WBC 4.6 12/19/2017    HGB 10.4 12/19/2017    MCV 99 12/19/2017    PLT 56 12/19/2017       Lab Results   Component Value Date    INR 1.93 12/19/2017       Radiology    Assessment  57 year old male    Plan        Angelito Dietz MD  Hepatology  [facility]    Hepatology New Patient Visit    Referring provider:  Sridevi Vann    Chief complaint: Alcoholic liver disease.      HPI:  Mr. Ibarra is a 57-year-old  male from [place].  He did get diagnosed with alcoholic liver disease in 2014.  He was followed by Dr. Hay and then he transitioned to Dr. Selena Kerr.  His main issue regarding his liver disease is he initially developed ascites and edema.  He was getting almost monthly paracentesis but he has not gotten any paracentesis in the last 6 months.  He also had a GI bleed 3 years ago which he attributes to some medication,  although he did have on EGD grade I to II esophageal varices and he was treated with a nonselective beta blockers which was Propranolol 10 mg t.i.d.  He had a colonoscopy on 06/26 also were adenomas were removed.      Mr. Ibarra regarding his symptoms today, he tells me he has some confusion, although his main issue is sleeping during the day.  He lost also a significant amount of muscle mass.  He denies any abdominal pain, nausea or vomiting.  He is taking lactulose 3 times a day around 30 mL each time and is moving his bowels 3-4 times.  He does not have any jaundice.  He lost weight.  He did have in the past a fall and this was in 12/2016 where he had an intracranial bleed, which resolved on its own. Patient denies fevers, sweats, chills     Medical hx Surgical hx   Past Medical History:   Diagnosis Date     DJD (degenerative joint disease)      Hypothyroid      Long term (current) use of opiate analgesic       No past surgical history on file.       Medications  Prior to Admission medications    Medication Sig Start Date End Date Taking? Authorizing Provider   cyclobenzaprine (FLEXERIL) 10 MG tablet Take 1 Tab by mouth 3 times a day as needed for Muscle Spasms. 11/10/17   Reported, Patient   ferrous sulfate (IRON) 325 (65 FE) MG tablet Take 325 mg by mouth 6/27/16   Reported, Patient   furosemide (LASIX) 20 MG tablet Take 60 mg by mouth 8/30/17   Reported, Patient   lactulose (CHRONULAC) 10 GM/15ML solution Take 30 mL by mouth three times daily 8/30/17   Reported, Patient   levothyroxine (SYNTHROID/LEVOTHROID) 50 MCG tablet Take 50 mcg by mouth 3/27/17   Reported, Patient   Magnesium 65 MG TABS Take 64 mg by mouth 8/30/17   Reported, Patient   omeprazole (PRILOSEC OTC) 20 MG tablet Take 20 mg by mouth 8/30/17   Reported, Patient   oxyCODONE IR (ROXICODONE) 15 MG tablet Take 15 mg by mouth 12/4/17 1/3/18  Reported, Patient   propranolol (INDERAL) 10 MG tablet Take 10 mg by mouth 10/23/17   Reported, Patient    spironolactone (ALDACTONE) 50 MG tablet Take 50 mg by mouth 8/30/17   Reported, Patient   rifaximin (XIFAXAN) 550 MG TABS tablet Take 550 mg by mouth 11/30/17   Reported, Patient       Allergies  Allergies   Allergen Reactions     Aspirin Other (See Comments)     Pt states that NSAIDS severely lowers his platelets     Nsaids Other (See Comments)     Pt states that NSAIDS severely lowers his platelets     Amoxicillin Swelling       Family hx Social hx   Family History   Problem Relation Age of Onset     Type 2 Diabetes Father      Hypothyroidism Sister      Hypothyroidism Brother       Social History   Substance Use Topics     Smoking status: Former Smoker     Types: Cigarettes     Start date: 12/19/1977     Smokeless tobacco: Never Used     Alcohol use No          Review of systems  He denies any recent infections, has fatigue.  He has occasional headaches, no seizures.  He has no cough, shortness of breath or chest pain.  Denies any anemia, some easy bruising.  He has significant psychiatric issues in the past including PTSD.  He has tinnitus in hearing.  Otherwise, he has dry skin and eczema as a young person.  Otherwise, a comprehensive review of systems was noncontributory.     Examination  There were no vitals taken for this visit.  There is no height or weight on file to calculate BMI.    Gen- well, NAD, A+Ox3, normal color  Eye- EOMI  ENT- MMM, normal oropharynx  Lym- no palpable lymphadenopathy  CVS- S1, S2 normal, no added sounds, RRR  RS- CTA  Abd- Distended. Positive shifting dullness.  Extr- pulses good, no TIMOTHY  MS- hands normal- no clubbing  Neuro- A+Ox3, no asterixis  Skin- no rash or jaundice  Psych- normal mood    Laboratory  Lab Results   Component Value Date     12/19/2017    POTASSIUM 4.3 12/19/2017    CHLORIDE 97 12/19/2017    CO2 27 12/19/2017    BUN 14 12/19/2017    CR 1.09 12/19/2017       Lab Results   Component Value Date    BILITOTAL 4.5 12/19/2017    ALT 17 12/19/2017    AST 26  12/19/2017    ALKPHOS 144 12/19/2017       Lab Results   Component Value Date    ALBUMIN 3.2 12/19/2017    PROTTOTAL 7.2 12/19/2017        Lab Results   Component Value Date    WBC 4.6 12/19/2017    HGB 10.4 12/19/2017    MCV 99 12/19/2017    PLT 56 12/19/2017       Lab Results   Component Value Date    INR 1.93 12/19/2017     MELD-Na score: 23 at 12/19/2017  8:17 AM  MELD score: 20 at 12/19/2017  8:17 AM  Calculated from:  Serum Creatinine: 1.09 mg/dL at 12/19/2017  8:17 AM  Serum Sodium: 133 mmol/L at 12/19/2017  8:17 AM  Total Bilirubin: 4.5 mg/dL at 12/19/2017  8:17 AM  INR(ratio): 1.93 at 12/19/2017  8:17 AM  Age: 57 years    Radiology    Assessment and plan:  Mr. Ibarra is a 57-year-old  male from [place] with significant history of alcohol use who develop decompensated cirrhosis with esophageal varices, ascites and hepatic encephalopathy.  For his esophageal varices he is on Inderal and continues that.  For his hepatic encephalopathy, he is on lactulose and he is lucid and clear.  For his ascites he required therapeutic paracentesis on a monthly basis and will require surveillance for HCC and his gastroenterologist is aware of that, but considering his MELD score being above the listing criteria that is the reason why he was referred here.  Please note that the patient was declined in [place] and the reason was his MELD score was too low.  He has a history of opioid use for quite a long time and the patient will be seen by our  and possibly get a psychosocial assessment and see what is going on with that.  If when he finishes the whole evaluation and he qualifies, then he could be listed for liver transplantation.      This was a 1 hour visit of which more than 50% was spent in explaining to the patient what our plan of care was.  We answered all his questions.      cc:  Primary Care physician     Angelito Dietz MD  Hepatology  [facility]

## 2017-12-19 NOTE — LETTER
12/19/2017       RE: Charanjit Ibarra  [address]  [place] [address]     Dear Colleague,    Thank you for referring your patient, Charanjit Ibarra, to the [facility] SOLID ORGAN TRANSPLANT at [facility]. Please see a copy of my visit note below.    Patient attended all appointments and completed all scheduled tests.  Patient to follow up with Transplant Coordinator.  Patient stated understanding and has contact numbers.      Again, thank you for allowing me to participate in the care of your patient.      Sincerely,    Transplant Evaluation Resource

## 2017-12-20 ENCOUNTER — TELEPHONE (OUTPATIENT)
Dept: TRANSPLANT | Facility: CLINIC | Age: 57
End: 2017-12-20

## 2017-12-20 LAB
ETHYL GLUCURONIDE UR QL: NEGATIVE
M TB TUBERC IFN-G BLD QL: NEGATIVE
M TB TUBERC IFN-G/MITOGEN IGNF BLD: 0.03 IU/ML

## 2017-12-20 NOTE — TELEPHONE ENCOUNTER
After selection committee review this nimishaer attempted to reach patient. Will proceed with a neuropsych testing and assess need for cardiology once all of outside transplant records received.

## 2017-12-29 ENCOUNTER — TELEPHONE (OUTPATIENT)
Dept: TRANSPLANT | Facility: CLINIC | Age: 57
End: 2017-12-29

## 2017-12-29 NOTE — LETTER
January 5, 2018    PRE-LIVER TRANSPLANT APPOINTMENT SCHEDULE    Patient:   Charanjit Ibarra  MR#:    [id number]  Coordinator:  Rosalba Peres  [phone]  :     Indira WILSON   [phone]  Location:    [facility]  Date:    January 29, 2018    Day/Date:  Monday, January 29, 2018  Time Location Activity   12:30 pm Neuropsychology  (3rd floor [facility] and [facility],  [address], [place] [address]) Appointment with Celena Frey  Neuropsychologist   4:30 pm [facility]  (3rd floor [facility]) Appointment with Dr. Batres,  Cardiology     * IF ON LINE CHECK IN IS NOT DONE, YOU WILL NEED TO CHECK IN 15 MINUTES EARLIER THEN THE FIRST SCHEDULED APPOINTMENT *      Day/Date:  Every Thursday *OPTIONAL*  Time Location Activity   12:00 pm to 1:30 pm Liver Transplant Support Group  [address]; [place] [address]  [address]  Room [phone] Every Thursday *OPTIONAL*

## 2017-12-29 NOTE — TELEPHONE ENCOUNTER
December 29, 2017: I spoke briefly with Kristen since Cahranjit wasn't available. I cancelled a cardiology appt mid-January so we could double up on appts. He will call back next week to either approve or move them.    1/29 (Monday)  12:30 P - Dr. Frey (for three hours)  4:30 P - Dr. Medardo Lowe  Pre-Liver Transplant   [phone]    Orders Only for Transplant Evaluation  12/19/2017       Rosalba Peres RN - [facility] Solid Organ Transplant Encounter Summary       Diagnosis       Alcoholic Cirrhosis (h) (Primary)              Orders Signed This Encounter (1)      MENTAL HEALTH REFERRAL  - Adult; Assessments and Testing; Neuropsychological Testing; Other: [facility] [phone]; We will contact you to schedule the appointment or please call with any questions

## 2018-01-03 ENCOUNTER — MEDICAL CORRESPONDENCE (OUTPATIENT)
Dept: TRANSPLANT | Facility: CLINIC | Age: 58
End: 2018-01-03

## 2018-01-05 ENCOUNTER — MEDICAL CORRESPONDENCE (OUTPATIENT)
Dept: TRANSPLANT | Facility: CLINIC | Age: 58
End: 2018-01-05

## 2018-01-05 NOTE — TELEPHONE ENCOUNTER
January 5, 2018: Kristen left a message on 1/3 and called back today to approve appointments on 1/29. She asked me to send a schedule, and I assured her that I would.    Indira Lowe  Pre-Liver Transplant   [phone]
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
## 2018-01-17 ASSESSMENT — ENCOUNTER SYMPTOMS
NECK PAIN: 1
MEMORY LOSS: 0
HEADACHES: 1
BACK PAIN: 1
PARALYSIS: 0
JOINT SWELLING: 1
MUSCLE WEAKNESS: 0
SPEECH CHANGE: 0
DIZZINESS: 0
MYALGIAS: 1
TINGLING: 1
TREMORS: 0
SEIZURES: 0
NUMBNESS: 1
MUSCLE CRAMPS: 1
WEAKNESS: 0
ARTHRALGIAS: 1
DISTURBANCES IN COORDINATION: 0
STIFFNESS: 1
LOSS OF CONSCIOUSNESS: 0

## 2018-01-29 ENCOUNTER — TELEPHONE (OUTPATIENT)
Dept: TRANSPLANT | Facility: CLINIC | Age: 58
End: 2018-01-29

## 2018-01-29 ENCOUNTER — PRE VISIT (OUTPATIENT)
Dept: CARDIOLOGY | Facility: CLINIC | Age: 58
End: 2018-01-29

## 2018-01-29 ENCOUNTER — OFFICE VISIT (OUTPATIENT)
Dept: NEUROPSYCHOLOGY | Facility: CLINIC | Age: 58
End: 2018-01-29
Payer: COMMERCIAL

## 2018-01-29 ENCOUNTER — OFFICE VISIT (OUTPATIENT)
Dept: CARDIOLOGY | Facility: CLINIC | Age: 58
End: 2018-01-29
Attending: INTERNAL MEDICINE
Payer: COMMERCIAL

## 2018-01-29 VITALS
HEIGHT: 68 IN | BODY MASS INDEX: 26.52 KG/M2 | SYSTOLIC BLOOD PRESSURE: 113 MMHG | OXYGEN SATURATION: 98 % | WEIGHT: 175 LBS | HEART RATE: 58 BPM | DIASTOLIC BLOOD PRESSURE: 68 MMHG

## 2018-01-29 DIAGNOSIS — K70.30 ALCOHOLIC CIRRHOSIS OF LIVER WITHOUT ASCITES (H): ICD-10-CM

## 2018-01-29 DIAGNOSIS — F09 COGNITIVE DISORDER: Primary | ICD-10-CM

## 2018-01-29 DIAGNOSIS — Z01.810 PRE-OPERATIVE CARDIOVASCULAR EXAMINATION: Primary | ICD-10-CM

## 2018-01-29 DIAGNOSIS — Z01.818 PRE-TRANSPLANT EVALUATION FOR LIVER TRANSPLANT: ICD-10-CM

## 2018-01-29 PROCEDURE — 99204 OFFICE O/P NEW MOD 45 MIN: CPT | Mod: ZP | Performed by: INTERNAL MEDICINE

## 2018-01-29 ASSESSMENT — PAIN SCALES - GENERAL: PAINLEVEL: NO PAIN (0)

## 2018-01-29 NOTE — TELEPHONE ENCOUNTER
Review of progress with Txp evaluation. Outside txp records received except  and selection meeting or transplant recommendations. Has neuro psych and cardiology today. Will verify he is not a smoker. Request support staff to work on getting missing records. Request  to set up follow up with hepatologist.

## 2018-01-29 NOTE — LETTER
1/29/2018      RE: Charanjit Ibarra  [address]  [facility] [address]       Dear Colleague,    Thank you for the opportunity to participate in the care of your patient, Charanjit Ibarra, at the [facility] HEART [facility]. Please see a copy of my visit note below.    Dear Dr. Dietz    We had the pleasure of seeing Mr. Mr. Ibarra in our heart failure and pulmonary hypertension clinic continues [facility].  Although you are familiar with his history please allow me to summarize it for the purpose of her records.    Mr. Ibarra is a pleasant 48-year-old male with past medical history significant for alcoholic cirrhosis and hepatitis was currently being evaluated for liver transplantation.  He was referred to us for a preop cardiac evaluation.  He has no known cardiac history.  He denies having any chest pain or chest pressure at rest or with exertion.  He has no exertional shortness of breath.  Is not very active secondary to his liver disease.  I will currently characterize him as NYHA functional class II.  He has not had any lightheadedness dizziness or syncope.  He has not had any significant lower extremity swelling and abdominal distention.    Past medical history  Alcoholic cirrhosis   Degenerative joint disease  Hypothyroidism  Long-term opiate analgesic    Past surgical history  None significant    Review of system  A detailed 10 point review of systems obtained as described in history of present illness all other systems reviewed and are negative    Medications  Current Outpatient Prescriptions   Medication Sig     ferrous sulfate (IRON) 325 (65 FE) MG tablet Take 325 mg by mouth     cyclobenzaprine (FLEXERIL) 10 MG tablet Take 1 Tab by mouth 3 times a day as needed for Muscle Spasms.     furosemide (LASIX) 20 MG tablet Take 60 mg by mouth     lactulose (CHRONULAC) 10 GM/15ML solution Take 30 mL by mouth three times daily      levothyroxine (SYNTHROID/LEVOTHROID) 50 MCG tablet Take 50 mcg by mouth     Magnesium 65 MG TABS Take 64 mg by mouth     omeprazole (PRILOSEC OTC) 20 MG tablet Take 20 mg by mouth     propranolol (INDERAL) 10 MG tablet Take 10 mg by mouth     spironolactone (ALDACTONE) 50 MG tablet Take 50 mg by mouth     rifaximin (XIFAXAN) 550 MG TABS tablet Take 550 mg by mouth     No current facility-administered medications for this visit.        Family history  No significant family history of premature coronary artery disease or an cardiac death or permanent hypertension    Personal history  Used to drink alcohol in the past.  He is sober now.  He is a former smoker.  He does not use any illicit drugs at present.    Social history  He is not .  He has a son who is not in contact with them.  He lives with his significant other.    Examination:   On exam he was comfortable.  He was in no apparent distress.  His blood pressure was 113/68 pulse rate was 58 and respiratory rate was 14.  He was saturating 98% on room air.  He was 135 pounds.  It was 5 foot 8 inches tall.  His BMI was 26.7.  He had no paler cyanosis or jaundice.  His neck exam revealed no JVD at 2 cm above manubrium sternal angle.  He had prominent carotid pulsation.  His carotids were 2+.  Cardiac auscultation revealed normal S1-S2 with no murmur rub or gallop.  Auscultation of his lungs reveal equal air entry on both sides with no added sounds.  His abdomen was soft with no wounds are noted and is no rigidity and no guarding.  He had no focal neurological deficit.    I reviewed his electrocardiogram.  This showed normal sinus rhythm with nonspecific T-wave changes and prolonged QT interval.  He had no evidence of right axis deviation right ventricular hypertrophy or right atrial enlargement.    He had a dobutamine stress echo which I personally reviewed.  His left ventricle was normal in size and function.  His right ventricle is normal in size and  function.  He had no significant atrial enlargement.  He had mild tricuspid regurgitation. His estimated right renal systolic pressure was 20 mmHg plus right atrial pressure.  His IVC was normal in size.  He had no other significant valvular abnormalities.  No pericardial effusion.    Assessment and plan    Mr. Ibarra is a 48-year-old male with alcoholic cirrhosis is currently being considered for liver transplantation.  He was referred to us for a cardiac evaluation.    His estimated right ventricular systolic pressure is within normal limits.  His right ventricle is normal in size and function.  The index of suspicion for significant intrinsic pulmonary vascular disease such as portal pulmonary hypertension is low.  His stress test showed no evidence of reversible ischemia.  He is at low risk for christine-operative  cardiovascular complications.  He does not need any further testing.    It was a pleasure meeting Mr. Crooks of her pulmonary hypertension and heart failure clinically as to [facility].  Thank you for involving us in his care.  Please do not hesitate to call us in the interim of any further questions.  We will see him on an as-needed basis.    Sincerely,  Medardo Batres MD   [facility] for Pulmonary [facility]pertension  Heart Failure, Transplant, and Mechanical Circulatory Support Cardiology   Cardiovascular Division  [facility]   [phone]

## 2018-01-29 NOTE — PATIENT INSTRUCTIONS
You were seen at the [facility] clinic today.  You saw Dr. Batres  Here are your Instructions:    1.  From the cardiac standpoint you are ok for a liver transplant.      Aura Castle RN  Nurse Care Coordinator  Office:  [phone] option #1, then #3 & ask for Aura (nurse line)  Fax:  [phone]  After Hours:  [phone]  Appointments:  [phone] option #1, then option #1

## 2018-01-29 NOTE — PROGRESS NOTES
Dear Dr. Dietz    We had the pleasure of seeing Mr. Mr. Ibarra in our heart failure and pulmonary hypertension clinic continues [facility].  Although you are familiar with his history please allow me to summarize it for the purpose of her records.    Mr. Ibarra is a pleasant 48-year-old male with past medical history significant for alcoholic cirrhosis and hepatitis was currently being evaluated for liver transplantation.  He was referred to us for a preop cardiac evaluation.  He has no known cardiac history.  He denies having any chest pain or chest pressure at rest or with exertion.  He has no exertional shortness of breath.  Is not very active secondary to his liver disease.  I will currently characterize him as NYHA functional class II.  He has not had any lightheadedness dizziness or syncope.  He has not had any significant lower extremity swelling and abdominal distention.    Past medical history  Alcoholic cirrhosis   Degenerative joint disease  Hypothyroidism  Long-term opiate analgesic    Past surgical history  None significant    Review of system  A detailed 10 point review of systems obtained as described in history of present illness all other systems reviewed and are negative    Medications  Current Outpatient Prescriptions   Medication Sig     ferrous sulfate (IRON) 325 (65 FE) MG tablet Take 325 mg by mouth     cyclobenzaprine (FLEXERIL) 10 MG tablet Take 1 Tab by mouth 3 times a day as needed for Muscle Spasms.     furosemide (LASIX) 20 MG tablet Take 60 mg by mouth     lactulose (CHRONULAC) 10 GM/15ML solution Take 30 mL by mouth three times daily     levothyroxine (SYNTHROID/LEVOTHROID) 50 MCG tablet Take 50 mcg by mouth     Magnesium 65 MG TABS Take 64 mg by mouth     omeprazole (PRILOSEC OTC) 20 MG tablet Take 20 mg by mouth     propranolol (INDERAL) 10 MG tablet Take 10 mg by mouth     spironolactone (ALDACTONE) 50 MG tablet Take 50 mg by mouth     rifaximin (XIFAXAN)  550 MG TABS tablet Take 550 mg by mouth     No current facility-administered medications for this visit.        Family history  No significant family history of premature coronary artery disease or an cardiac death or permanent hypertension    Personal history  Used to drink alcohol in the past.  He is sober now.  He is a former smoker.  He does not use any illicit drugs at present.    Social history  He is not .  He has a son who is not in contact with them.  He lives with his significant other.    Examination:   On exam he was comfortable.  He was in no apparent distress.  His blood pressure was 113/68 pulse rate was 58 and respiratory rate was 14.  He was saturating 98% on room air.  He was 135 pounds.  It was 5 foot 8 inches tall.  His BMI was 26.7.  He had no paler cyanosis or jaundice.  His neck exam revealed no JVD at 2 cm above manubrium sternal angle.  He had prominent carotid pulsation.  His carotids were 2+.  Cardiac auscultation revealed normal S1-S2 with no murmur rub or gallop.  Auscultation of his lungs reveal equal air entry on both sides with no added sounds.  His abdomen was soft with no wounds are noted and is no rigidity and no guarding.  He had no focal neurological deficit.    I reviewed his electrocardiogram.  This showed normal sinus rhythm with nonspecific T-wave changes and prolonged QT interval.  He had no evidence of right axis deviation right ventricular hypertrophy or right atrial enlargement.    He had a dobutamine stress echo which I personally reviewed.  His left ventricle was normal in size and function.  His right ventricle is normal in size and function.  He had no significant atrial enlargement.  He had mild tricuspid regurgitation. His estimated right renal systolic pressure was 20 mmHg plus right atrial pressure.  His IVC was normal in size.  He had no other significant valvular abnormalities.  No pericardial effusion.    Assessment and plan    Mr. Ibarra is a  48-year-old male with alcoholic cirrhosis is currently being considered for liver transplantation.  He was referred to us for a cardiac evaluation.    His estimated right ventricular systolic pressure is within normal limits.  His right ventricle is normal in size and function.  The index of suspicion for significant intrinsic pulmonary vascular disease such as portal pulmonary hypertension is low.  His stress test showed no evidence of reversible ischemia.  He is at low risk for christine-operative  cardiovascular complications.  He does not need any further testing.    It was a pleasure meeting Mr. Crooks of her pulmonary hypertension and heart failure clinically as to [facility].  Thank you for involving us in his care.  Please do not hesitate to call us in the interim of any further questions.  We will see him on an as-needed basis.    Sincerely,  Medardo Batres MD   [facility] Pulmonary Hypertension  Heart Failure, Transplant, and Mechanical Circulatory Support Cardiology   Cardiovascular Division  [facility]   [phone]

## 2018-01-29 NOTE — LETTER
1/29/2018      RE: Charanjit Ibarra  [address]  [facility] [address]       Dear Dr. Dietz    We had the pleasure of seeing Mr. Mr. Ibarra in our heart failure and pulmonary hypertension clinic continues to of [facility].  Although you are familiar with his history please allow me to summarize it for the purpose of her records.    Mr. Ibarra is a pleasant 48-year-old male with past medical history significant for alcoholic cirrhosis and hepatitis was currently being evaluated for liver transplantation.  He was referred to us for a preop cardiac evaluation.  He has no known cardiac history.  He denies having any chest pain or chest pressure at rest or with exertion.  He has no exertional shortness of breath.  Is not very active secondary to his liver disease.  I will currently characterize him as NYHA functional class II.  He has not had any lightheadedness dizziness or syncope.  He has not had any significant lower extremity swelling and abdominal distention.    Past medical history  Alcoholic cirrhosis   Degenerative joint disease  Hypothyroidism  Long-term opiate analgesic    Past surgical history  None significant    Review of system  A detailed 10 point review of systems obtained as described in history of present illness all other systems reviewed and are negative    Medications  Current Outpatient Prescriptions   Medication Sig     ferrous sulfate (IRON) 325 (65 FE) MG tablet Take 325 mg by mouth     cyclobenzaprine (FLEXERIL) 10 MG tablet Take 1 Tab by mouth 3 times a day as needed for Muscle Spasms.     furosemide (LASIX) 20 MG tablet Take 60 mg by mouth     lactulose (CHRONULAC) 10 GM/15ML solution Take 30 mL by mouth three times daily     levothyroxine (SYNTHROID/LEVOTHROID) 50 MCG tablet Take 50 mcg by mouth     Magnesium 65 MG TABS Take 64 mg by mouth     omeprazole (PRILOSEC OTC) 20 MG tablet Take 20 mg by mouth     propranolol (INDERAL) 10 MG tablet Take 10 mg by mouth      spironolactone (ALDACTONE) 50 MG tablet Take 50 mg by mouth     rifaximin (XIFAXAN) 550 MG TABS tablet Take 550 mg by mouth     No current facility-administered medications for this visit.        Family history  No significant family history of premature coronary artery disease or an cardiac death or permanent hypertension    Personal history  Used to drink alcohol in the past.  He is sober now.  He is a former smoker.  He does not use any illicit drugs at present.    Social history  He is not .  He has a son who is not in contact with them.  He lives with his significant other.    Examination:   On exam he was comfortable.  He was in no apparent distress.  His blood pressure was 113/68 pulse rate was 58 and respiratory rate was 14.  He was saturating 98% on room air.  He was 135 pounds.  It was 5 foot 8 inches tall.  His BMI was 26.7.  He had no paler cyanosis or jaundice.  His neck exam revealed no JVD at 2 cm above manubrium sternal angle.  He had prominent carotid pulsation.  His carotids were 2+.  Cardiac auscultation revealed normal S1-S2 with no murmur rub or gallop.  Auscultation of his lungs reveal equal air entry on both sides with no added sounds.  His abdomen was soft with no wounds are noted and is no rigidity and no guarding.  He had no focal neurological deficit.    I reviewed his electrocardiogram.  This showed normal sinus rhythm with nonspecific T-wave changes and prolonged QT interval.  He had no evidence of right axis deviation right ventricular hypertrophy or right atrial enlargement.    He had a dobutamine stress echo which I personally reviewed.  His left ventricle was normal in size and function.  His right ventricle is normal in size and function.  He had no significant atrial enlargement.  He had mild tricuspid regurgitation. His estimated right renal systolic pressure was 20 mmHg plus right atrial pressure.  His IVC was normal in size.  He had no other significant valvular  abnormalities.  No pericardial effusion.    Assessment and plan    Mr. Ibarra is a 48-year-old male with alcoholic cirrhosis is currently being considered for liver transplantation.  He was referred to us for a cardiac evaluation.    His estimated right ventricular systolic pressure is within normal limits.  His right ventricle is normal in size and function.  The index of suspicion for significant intrinsic pulmonary vascular disease such as portal pulmonary hypertension is low.  His stress test showed no evidence of reversible ischemia.  He is at low risk for christine-operative  cardiovascular complications.  He does not need any further testing.    It was a pleasure meeting Mr. Crooks of her pulmonary hypertension and heart failure clinically as to [facility].  Thank you for involving us in his care.  Please do not hesitate to call us in the interim of any further questions.  We will see him on an as-needed basis.    Sincerely,  Medardo Batres MD   [facility]  Heart Failure, Transplant, and Mechanical Circulatory Support Cardiology   Cardiovascular Division  [facility]   [phone]        Medardo Batres MD

## 2018-01-29 NOTE — MR AVS SNAPSHOT
After Visit Summary   1/29/2018    Charanjit Ibarra    MRN: [id number]           Patient Information     Date Of Birth          1960        Visit Information        Provider Department      1/29/2018 12:30 PM Vanesa Frey PsyD [facility] Neuropsychology        Today's Diagnoses     Cognitive disorder    -  1    Pre-transplant evaluation for liver transplant           Follow-ups after your visit        Your next 10 appointments already scheduled     May 09, 2018  8:30 AM CDT   Lab with  LAB   [facility] Lab ([facility])    [address] [place] [address]  1st Floor  [facility] [id number] [phone]            May 09, 2018  9:00 AM CDT   (Arrive by 8:45 AM)   Return General Liver with Angelito Dietz MD   [facility] Hepatology ([facility])    [address] [place] [address]  [address]  [facility] [id number] [phone]            May 09, 2018 10:00 AM CDT   (Arrive by 9:45 AM)   SOT SOCIAL WORK EVAL with Janny Mcclelland [facility] Solid Organ Transplant ([facility])    [address] [place] [address]  [address]  [facility] [id number] [phone]              Who to contact     Please call your clinic at [phone] to:    Ask questions about your health    Make or cancel appointments    Discuss your medicines    Learn about your test results    Speak to your doctor            Additional Information About Your Visit        [employer] Information     [employer] gives you secure access to your electronic health record. If you see a primary care provider, you can also send messages to your care team and make appointments. If you have questions, please call your primary care clinic.  If you do not have a primary care provider, please call [phone] and they will assist you.      [employer] is an electronic gateway that provides easy, online access to your medical records. With [employer], you can request a clinic  appointment, read your test results, renew a prescription or communicate with your care team.     To access your existing account, please contact your [facility] or call [phone] for assistance.        Care EveryWhere ID     This is your Care EveryWhere ID. This could be used by other organizations to access your [place] medical records  [id number]         Blood Pressure from Last 3 Encounters:   01/29/18 113/68   12/19/17 131/80    Weight from Last 3 Encounters:   01/29/18 79.4 kg (175 lb)   12/19/17 83.4 kg (183 lb 14.4 oz)   11/08/17 79.4 kg (175 lb)              We Performed the Following     [id number] TESTING, PER HR/PSYCHOLOGIST     NEUROPSYCH TESTING BY [facility]        Primary Care Provider Fax #    Physician No Ref-Primary [phone]       No address on file        Equal Access to Services     FABIO MAXWELL : Sonja Hart, liborio glover, pina rene, lena srivastava . So [facility] [phone].    ATENCIÓN: Si habla español, tiene a hall disposición servicios gratuitos de asistencia lingüística. Kem al [phone].    We comply with applicable federal civil rights laws and [place] laws. We do not discriminate on the basis of race, color, national origin, age, disability, sex, sexual orientation, or gender identity.            Thank you!     Thank you for choosing [facility] NEUROPSYCHOLOGY  for your care. Our goal is always to provide you with excellent care. Hearing back from our patients is one way we can continue to improve our services. Please take a few minutes to complete the written survey that you may receive in the mail after your visit with us. Thank you!             Your Updated Medication List - Protect others around you: Learn how to safely use, store and throw away your medicines at www.disposemymeds.org.          This list is accurate as of 1/29/18 11:59 PM.  Always use your most recent med list.                    Brand Name Dispense Instructions for use Diagnosis    cyclobenzaprine 10 MG tablet    FLEXERIL     Take 1 Tab by mouth 3 times a day as needed for Muscle Spasms.        ferrous sulfate 325 (65 FE) MG tablet    IRON     Take 325 mg by mouth        furosemide 20 MG tablet    LASIX     Take 60 mg by mouth        lactulose 10 GM/15ML solution    CHRONULAC     Take 30 mL by mouth three times daily        levothyroxine 50 MCG tablet    SYNTHROID/LEVOTHROID     Take 50 mcg by mouth        Magnesium 65 MG Tabs      Take 64 mg by mouth        omeprazole 20 MG tablet    priLOSEC OTC     Take 20 mg by mouth        propranolol 10 MG tablet    INDERAL     Take 10 mg by mouth        spironolactone 50 MG tablet    ALDACTONE     Take 50 mg by mouth        XIFAXAN 550 MG Tabs tablet   Generic drug:  rifaximin      Take 550 mg by mouth

## 2018-01-29 NOTE — NURSING NOTE
Chief Complaint   Patient presents with     New Patient     pre-liver tx cardiac eval     Vitals were taken and medications were reconciled.   Dawna Best    4:17 PM

## 2018-01-29 NOTE — MR AVS SNAPSHOT
After Visit Summary   1/29/2018    Charanjit Ibarra    MRN: [id number]           Patient Information     Date Of Birth          1960        Visit Information        Provider Department      1/29/2018 4:30 PM Medardo Batres MD [facility]        Care Instructions    You were seen at the [facility] clinic today.  You saw Dr. Batres  Here are your Instructions:    1.  From the cardiac standpoint you are ok for a liver transplant.      Aura Castle RN  Nurse Care Coordinator  Office:  [phone] option #1, then #3 & ask for Aura (nurse line)  Fax:  [phone]  After Hours:  [phone]  Appointments:  [phone] option #1, then option #1                        Follow-ups after your visit        Your next 10 appointments already scheduled     Feb 20, 2018  8:40 AM CST   (Arrive by 8:25 AM)   Return General Liver with Angelito Dietz MD   [facility] Hepatology ([facility])    [address] [place] [address]  [address] [place] [id number] [phone]              Who to contact     If you have questions or need follow up information about today's clinic visit or your schedule please contact [facility] directly at [phone].  Normal or non-critical lab and imaging results will be communicated to you by MyChart, letter or phone within 4 business days after the clinic has received the results. If you do not hear from us within 7 days, please contact the clinic through MyChart or phone. If you have a critical or abnormal lab result, we will notify you by phone as soon as possible.  Submit refill requests through [employer] or call your pharmacy and they will forward the refill request to us. Please allow 3 business days for your refill to be completed.          Additional Information About Your Visit        [employer] Information     [employer] gives you secure access to your electronic health record. If  "you see a primary care provider, you can also send messages to your care team and make appointments. If you have questions, please call your primary care clinic.  If you do not have a primary care provider, please call [phone] and they will assist you.        Care EveryWhere ID     This is your Care EveryWhere ID. This could be used by other organizations to access your [place] medical records  [id number]        Your Vitals Were     Pulse Height Pulse Oximetry BMI (Body Mass Index)          58 1.727 m (5' 8\") 98% 26.61 kg/m2         Blood Pressure from Last 3 Encounters:   01/29/18 113/68   12/19/17 131/80    Weight from Last 3 Encounters:   01/29/18 79.4 kg (175 lb)   12/19/17 83.4 kg (183 lb 14.4 oz)   11/08/17 79.4 kg (175 lb)              Today, you had the following     No orders found for display       Primary Care Provider Fax #    Physician No Ref-Primary [phone]       No address on file        Equal Access to Services     PEEWEE MAXWELL : Hadii ryley ku hadasho Soomaali, waaxda luqadaha, qaybta kaalmada adeegyada, lena srivastava . So [facility] [phone].    ATENCIÓN: Si habla español, tiene a hall disposición servicios gratuitos de asistencia lingüística. Llame al [phone].    We comply with applicable federal civil rights laws and [place] laws. We do not discriminate on the basis of race, color, national origin, age, disability, sex, sexual orientation, or gender identity.            Thank you!     Thank you for choosing [facility]  for your care. Our goal is always to provide you with excellent care. Hearing back from our patients is one way we can continue to improve our services. Please take a few minutes to complete the written survey that you may receive in the mail after your visit with us. Thank you!             Your Updated Medication List - Protect others around you: Learn how to safely use, store and throw away your medicines at www.disposemymeds.org. "          This list is accurate as of 1/29/18  5:08 PM.  Always use your most recent med list.                   Brand Name Dispense Instructions for use Diagnosis    cyclobenzaprine 10 MG tablet    FLEXERIL     Take 1 Tab by mouth 3 times a day as needed for Muscle Spasms.        ferrous sulfate 325 (65 FE) MG tablet    IRON     Take 325 mg by mouth        furosemide 20 MG tablet    LASIX     Take 60 mg by mouth        lactulose 10 GM/15ML solution    CHRONULAC     Take 30 mL by mouth three times daily        levothyroxine 50 MCG tablet    SYNTHROID/LEVOTHROID     Take 50 mcg by mouth        Magnesium 65 MG Tabs      Take 64 mg by mouth        omeprazole 20 MG tablet    priLOSEC OTC     Take 20 mg by mouth        propranolol 10 MG tablet    INDERAL     Take 10 mg by mouth        spironolactone 50 MG tablet    ALDACTONE     Take 50 mg by mouth        XIFAXAN 550 MG Tabs tablet   Generic drug:  rifaximin      Take 550 mg by mouth

## 2018-01-29 NOTE — NURSING NOTE
The patient was seen for neuropsychological evaluation at the request of Dr. Angelito Dietz for the purpose of diagnostic clarification and treatment planning.  1 hours of face to face testing were provided by this writer.  Please see Dr. Vanesa Frey's report for a full interpretation of the findings.

## 2018-02-02 ENCOUNTER — TELEPHONE (OUTPATIENT)
Dept: TRANSPLANT | Facility: CLINIC | Age: 58
End: 2018-02-02

## 2018-02-02 NOTE — TELEPHONE ENCOUNTER
----- Message from Indira Forrest sent at 1/29/2018  3:16 PM CST -----  Regarding: RE: Can you please make follow up with Dr. Mirela BARRETO for pt today (1/29) while he was seeing Dr. Frey    Date: 2/20/2018 Status: Leida  Time: 8:40 AM Length: 20  Visit Type: UMP RETURN GENERAL LIVER [[id number]]  Provider: Angelito Dietz MD      ----- Message -----     From: Rosalba Peres RN     Sent: 1/29/2018   1:38 PM       To: Indira Forrest  Subject: Can you please make follow up with Dr. Nieto#    2 mo follow up in mid February, late if no openings.    ThanksRosalba

## 2018-02-02 NOTE — TELEPHONE ENCOUNTER
February 2, 2018: I spoke with Charanjit who prefers to cancel the appointment and discuss his needs with his coordinator. I offered to move his appointment to a more preferable date, but he asked to cancel the appt on 2/20 and speak with Rosalba Peres RN first.    Indira Lowe  Pre-Liver Transplant   [phone]

## 2018-02-06 ENCOUNTER — TELEPHONE (OUTPATIENT)
Dept: TRANSPLANT | Facility: CLINIC | Age: 58
End: 2018-02-06

## 2018-02-06 NOTE — LETTER
February 6, 2018    PRE-LIVER TRANSPLANT APPOINTMENT SCHEDULE    Patient:   Charanjit Ibarra  MR#:    [id number]  Coordinator:          Rosalba Peres  [phone]  :     Indira WILSON   [phone]  [facility]    [facility]  Date:    May 9, 2018      Day/Date: Wednesday, May 9, 2018  Time Location Activity   8:30 am Imaging and Lab testing  (1st floor [facility],  [address]; [facility] [address]) Blood tests    9:10 am Medicine Specialties  (3rd floor [facility]) Appointment with Dr. Dietz,  Hepatologist   10:00 am Transplant Services  (3rd floor [facility]) Appointment with Janny Mcclelland,       * IF ONLINE CHECK IN IS NOT DONE, YOU WILL NEED TO CHECK IN AT LEAST 15 MINUTES EARLIER THAN THE FIRST SCHEDULED APPOINTMENT *        Day/Date: Every Thursday *OPTIONAL*  Time Location Activity   12:00 pm to 1:30 pm Liver Transplant Support Group  [address]; [facility] [address]  [address]  Room 7-120 Every Thursday *OPTIONAL*
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
## 2018-02-12 NOTE — PROGRESS NOTES
WECHSLER ADULT INTELLIGENCE SCALE-IV CONTROLLED WORD ASSOCIATION TEST        Age-Scaled Score Score   39    Vocabulary           9       Digit Span           8      PORTEUS MAZE TEST   Block Design           7       Coding         11     Test Age          17           Test Quotient        121       WECHSLER MEMORY SCALES     TRAIL MAKING TEST   Logical Mem Immediate              12/12     Logical Mem 30 Minute  10/8   Time Errors   Visual Repr Immediate                  7    A   55             0    Visual Repr 30                   7   B   75             0          30 Minute Recognition                  3         PSYCHOMOTOR TESTS   TEST OF SUSTAINED ATTENTION & TRACKING      Right    Left    Total Time     93   Finger Tapping   25      25      Total Errors    0     Grooved Pegboard   87           93       Drops: 0           Drops: 0

## 2018-05-07 DIAGNOSIS — K70.30 ALCOHOLIC CIRRHOSIS (H): Primary | ICD-10-CM

## 2018-05-07 DIAGNOSIS — F10.11 ALCOHOL ABUSE, IN REMISSION: ICD-10-CM

## 2018-05-09 ENCOUNTER — APPOINTMENT (OUTPATIENT)
Dept: TRANSPLANT | Facility: CLINIC | Age: 58
End: 2018-05-09
Attending: INTERNAL MEDICINE
Payer: MEDICARE

## 2018-05-09 ENCOUNTER — OFFICE VISIT (OUTPATIENT)
Dept: GASTROENTEROLOGY | Facility: CLINIC | Age: 58
End: 2018-05-09
Attending: INTERNAL MEDICINE
Payer: MEDICARE

## 2018-05-09 VITALS
RESPIRATION RATE: 18 BRPM | SYSTOLIC BLOOD PRESSURE: 112 MMHG | HEIGHT: 68 IN | TEMPERATURE: 98.2 F | BODY MASS INDEX: 29.08 KG/M2 | WEIGHT: 191.9 LBS | HEART RATE: 57 BPM | DIASTOLIC BLOOD PRESSURE: 64 MMHG | OXYGEN SATURATION: 97 %

## 2018-05-09 DIAGNOSIS — K70.31 ALCOHOLIC CIRRHOSIS OF LIVER WITH ASCITES (H): Primary | ICD-10-CM

## 2018-05-09 DIAGNOSIS — F10.11 ALCOHOL ABUSE, IN REMISSION: ICD-10-CM

## 2018-05-09 DIAGNOSIS — K70.30 ALCOHOLIC CIRRHOSIS OF LIVER (H): Primary | ICD-10-CM

## 2018-05-09 DIAGNOSIS — M81.0 OSTEOPOROSIS: ICD-10-CM

## 2018-05-09 DIAGNOSIS — G89.29 CHRONIC PAIN: ICD-10-CM

## 2018-05-09 DIAGNOSIS — K70.30 ALCOHOLIC CIRRHOSIS (H): ICD-10-CM

## 2018-05-09 LAB
AFP SERPL-MCNC: 3.1 UG/L (ref 0–8)
ALBUMIN SERPL-MCNC: 2.7 G/DL (ref 3.4–5)
ALP SERPL-CCNC: 129 U/L (ref 40–150)
ALT SERPL W P-5'-P-CCNC: 18 U/L (ref 0–70)
ANION GAP SERPL CALCULATED.3IONS-SCNC: 6 MMOL/L (ref 3–14)
AST SERPL W P-5'-P-CCNC: 23 U/L (ref 0–45)
BILIRUB DIRECT SERPL-MCNC: 1.1 MG/DL (ref 0–0.2)
BILIRUB SERPL-MCNC: 2.2 MG/DL (ref 0.2–1.3)
BUN SERPL-MCNC: 14 MG/DL (ref 7–30)
CALCIUM SERPL-MCNC: 8.2 MG/DL (ref 8.5–10.1)
CHLORIDE SERPL-SCNC: 99 MMOL/L (ref 94–109)
CO2 SERPL-SCNC: 28 MMOL/L (ref 20–32)
CREAT SERPL-MCNC: 1.08 MG/DL (ref 0.66–1.25)
ERYTHROCYTE [DISTWIDTH] IN BLOOD BY AUTOMATED COUNT: 15.3 % (ref 10–15)
GFR SERPL CREATININE-BSD FRML MDRD: 70 ML/MIN/1.7M2
GLUCOSE SERPL-MCNC: 264 MG/DL (ref 70–99)
HCT VFR BLD AUTO: 31 % (ref 40–53)
HGB BLD-MCNC: 10.6 G/DL (ref 13.3–17.7)
INR PPP: 1.8 (ref 0.86–1.14)
MCH RBC QN AUTO: 33.8 PG (ref 26.5–33)
MCHC RBC AUTO-ENTMCNC: 34.2 G/DL (ref 31.5–36.5)
MCV RBC AUTO: 99 FL (ref 78–100)
PLATELET # BLD AUTO: 55 10E9/L (ref 150–450)
POTASSIUM SERPL-SCNC: 4 MMOL/L (ref 3.4–5.3)
PROT SERPL-MCNC: 6.6 G/DL (ref 6.8–8.8)
RBC # BLD AUTO: 3.14 10E12/L (ref 4.4–5.9)
SODIUM SERPL-SCNC: 133 MMOL/L (ref 133–144)
WBC # BLD AUTO: 4.2 10E9/L (ref 4–11)

## 2018-05-09 PROCEDURE — 85027 COMPLETE CBC AUTOMATED: CPT | Performed by: INTERNAL MEDICINE

## 2018-05-09 PROCEDURE — 80076 HEPATIC FUNCTION PANEL: CPT | Performed by: INTERNAL MEDICINE

## 2018-05-09 PROCEDURE — 82105 ALPHA-FETOPROTEIN SERUM: CPT | Performed by: INTERNAL MEDICINE

## 2018-05-09 PROCEDURE — G0463 HOSPITAL OUTPT CLINIC VISIT: HCPCS | Mod: ZF

## 2018-05-09 PROCEDURE — 85610 PROTHROMBIN TIME: CPT | Performed by: INTERNAL MEDICINE

## 2018-05-09 PROCEDURE — 80321 ALCOHOLS BIOMARKERS 1OR 2: CPT | Performed by: INTERNAL MEDICINE

## 2018-05-09 PROCEDURE — 80048 BASIC METABOLIC PNL TOTAL CA: CPT | Performed by: INTERNAL MEDICINE

## 2018-05-09 PROCEDURE — 36415 COLL VENOUS BLD VENIPUNCTURE: CPT | Performed by: INTERNAL MEDICINE

## 2018-05-09 RX ORDER — SALIVA STIMULANT COMB. NO.3
SPRAY, NON-AEROSOL (ML) MUCOUS MEMBRANE PRN
COMMUNITY

## 2018-05-09 RX ORDER — OXYCODONE HYDROCHLORIDE 15 MG/1
15-30 TABLET ORAL EVERY 6 HOURS PRN
Status: ON HOLD | COMMUNITY
End: 2020-01-01

## 2018-05-09 RX ORDER — MODAFINIL 100 MG/1
TABLET ORAL DAILY
COMMUNITY

## 2018-05-09 ASSESSMENT — PAIN SCALES - GENERAL: PAINLEVEL: WORST PAIN (10)

## 2018-05-09 NOTE — LETTER
Date:May 11, 2018      Patient was self referred, no letter generated. Do not send.        [facility] Information

## 2018-05-09 NOTE — LETTER
Date:May 11, 2018      Patient was self referred, no letter generated. Do not send.        [facility] Information

## 2018-05-09 NOTE — PROGRESS NOTES
[facility]    Hepatology follow-up    CHIEF COMPLAINT AND REASON FOR THE VISIT:  Alcoholic cirrhosis.      SUBJECTIVE:  Mr. Ibarra is a 58-year-old  male who comes here for transplant evaluation.  He has a diagnosis of alcoholic liver disease which was made in 2014.  He was seen locally by gastroenterologist, and in fact, he was evaluated in [place].  They thought at that time he was doing quite well, and he was not considered for transplantation.  Anyway, since I last saw him, he had an admission on 02/27/2017 for a GI bleed which was identified as relating with the variceal bleed, and he got banded, but otherwise he for almost a year did not require any paracentesis.  He has off and on edema.  He did not have after that initial bleed any melena or hematemesis.  He is not claiming to be jaundiced.  He has no pruritus, and above all, he has no confusion or memory issues, although he has some sleep cycle disturbances and he takes lactulose.  He also denies other GI symptoms including abdominal pain, nausea or vomiting, and with the lactulose he is having like 3 bowel movements.  Mr. Ibarra also tells us that he has chronic pain almost everywhere.  He is on narcotics for a long time now, and he is followed by his primary care physician. Patient denies fevers, sweats or chills.  Weight stable.      Medical hx Surgical hx   Past Medical History:   Diagnosis Date     DJD (degenerative joint disease)      Hypothyroid      Long term (current) use of opiate analgesic       No past surgical history on file.       Medications  Prior to Admission medications    Medication Sig Start Date End Date Taking? Authorizing Provider   artificial saliva (BIOTENE MT) SOLN solution Swish and spit in mouth as needed for dry mouth   Yes Reported, Patient   cyclobenzaprine (FLEXERIL) 10 MG tablet Take 1 Tab by mouth 3 times a day as needed for Muscle Spasms. 11/10/17  Yes Reported, Patient  "  diclofenac (VOLTAREN) 1 % GEL topical gel Place onto the skin daily as needed for moderate pain   Yes Reported, Patient   ferrous sulfate (IRON) 325 (65 FE) MG tablet Take 325 mg by mouth 6/27/16  Yes Reported, Patient   furosemide (LASIX) 20 MG tablet Take 40 mg in a.m. and 20 mg in afternoon 8/30/17  Yes Reported, Patient   lactulose (CHRONULAC) 10 GM/15ML solution Take 30 mL by mouth three times daily 8/30/17  Yes Reported, Patient   levothyroxine (SYNTHROID/LEVOTHROID) 50 MCG tablet Take 50 mcg by mouth 3/27/17  Yes Reported, Patient   Magnesium 65 MG TABS Take 64 mg by mouth 8/30/17  Yes Reported, Patient   modafinil (PROVIGIL) 100 MG tablet Take 100 mg by mouth daily   Yes Reported, Patient   Multiple Vitamins-Minerals (MULTIVITAMIN PO) Take 1 tablet by mouth daily   Yes Reported, Patient   omeprazole (PRILOSEC OTC) 20 MG tablet Take 20 mg by mouth 2 times daily  8/30/17  Yes Reported, Patient   oxyCODONE IR (ROXICODONE) 15 MG tablet Take 15-30 mg by mouth every 6 hours as needed for moderate to severe pain   Yes Reported, Patient   propranolol (INDERAL) 10 MG tablet Take 10 mg by mouth 3 times daily  10/23/17  Yes Reported, Patient   rifaximin (XIFAXAN) 550 MG TABS tablet Take 550 mg by mouth 2 times daily  11/30/17  Yes Reported, Patient   spironolactone (ALDACTONE) 50 MG tablet Take 25 mg by mouth daily  8/30/17  Yes Reported, Patient       Allergies  Allergies   Allergen Reactions     Aspirin Other (See Comments)     Pt states that NSAIDS severely lowers his platelets     Nsaids Other (See Comments)     Pt states that NSAIDS severely lowers his platelets     Amoxicillin Swelling       Review of systems  A 10-point review of systems was negative    Examination  /64 (BP Location: Right arm, Patient Position: Sitting, Cuff Size: Adult Regular)  Pulse 57  Temp 98.2  F (36.8  C) (Oral)  Resp 18  Ht 1.727 m (5' 8\")  Wt 87 kg (191 lb 14.4 oz)  SpO2 97%  BMI 29.18 kg/m2  Body mass index is 29.18 " kg/(m^2).    Gen- well, NAD, A+Ox3, normal color  Lym- no palpable LAD  CVS- RRR  RS- CTA  Abd- Obese. No shifting dullness noted.  Extr- hands normal, no TIMOTHY  Skin- no rash or jaundice  Neuro- no asterixis  Psych- normal mood    Laboratory  Lab Results   Component Value Date     05/09/2018    POTASSIUM 4.0 05/09/2018    CHLORIDE 99 05/09/2018    CO2 28 05/09/2018    BUN 14 05/09/2018    CR 1.08 05/09/2018       Lab Results   Component Value Date    BILITOTAL 2.2 05/09/2018    ALT 18 05/09/2018    AST 23 05/09/2018    ALKPHOS 129 05/09/2018       Lab Results   Component Value Date    ALBUMIN 2.7 05/09/2018    PROTTOTAL 6.6 05/09/2018        Lab Results   Component Value Date    WBC 4.2 05/09/2018    HGB 10.6 05/09/2018    MCV 99 05/09/2018    PLT 55 05/09/2018       Lab Results   Component Value Date    INR 1.80 05/09/2018     MELD-Na score: 20 at 5/9/2018  7:32 AM  MELD score: 17 at 5/9/2018  7:32 AM  Calculated from:  Serum Creatinine: 1.08 mg/dL at 5/9/2018  7:32 AM  Serum Sodium: 133 mmol/L at 5/9/2018  7:32 AM  Total Bilirubin: 2.2 mg/dL at 5/9/2018  7:32 AM  INR(ratio): 1.80 at 5/9/2018  7:32 AM  Age: 58 years    Radiology    ASSESSMENT AND PLAN:  Mr. Ibarra is a 58-year-old male who carries a diagnosis of alcoholic cirrhosis.  He has a MELD sodium of 20 and a MELD score of 17.  He is above the listing criteria, so he qualifies regarding that.  His main issue for not being listed so far is his evaluation by the  , which he is going to see today.  He has blood type AB, so if he gets listed then he will most likely be transplanted with a low MELD score.  Anyway, Janny will summarize regarding that.  We will send him to the Pain Center here and see if he could be weaned off from the pain medications.  We will get him that appointment.  Regarding other health care maintenance, he will follow locally.  For his esophageal varices, considering his recent bleed they will be doing surveillance  EGDs.  For surveillance for HCC he will have an abdominal ultrasound sometime in June.  For his other medical issues, he will follow with his primary care physician.      This was a 25-minute visit of which more than 50% was spent in explaining to the patient and his wife what our plan of care was.  We answered all his questions.         Angelito Dietz MD  Hepatology  [facility]

## 2018-05-09 NOTE — LETTER
5/9/2018       RE: Charanjit Ibarra  [address]  [facility] [address]     Dear Colleague,    Thank you for referring your patient, Charanjit Ibarra, to the [facility] HEPATOLOGY at [facility]. Please see a copy of my visit note below.    [facility]    Hepatology follow-up    CHIEF COMPLAINT AND REASON FOR THE VISIT:  Alcoholic cirrhosis.      SUBJECTIVE:  Mr. Ibarra is a 58-year-old  male who comes here for transplant evaluation.  He has a diagnosis of alcoholic liver disease which was made in 2014.  He was seen locally by gastroenterologist, and in fact, he was evaluated in [place].  They thought at that time he was doing quite well, and he was not considered for transplantation.  Anyway, since I last saw him, he had an admission on 02/27/2017 for a GI bleed which was identified as relating with the variceal bleed, and he got banded, but otherwise he for almost a year did not require any paracentesis.  He has off and on edema.  He did not have after that initial bleed any melena or hematemesis.  He is not claiming to be jaundiced.  He has no pruritus, and above all, he has no confusion or memory issues, although he has some sleep cycle disturbances and he takes lactulose.  He also denies other GI symptoms including abdominal pain, nausea or vomiting, and with the lactulose he is having like 3 bowel movements.  Mr. Ibarra also tells us that he has chronic pain almost everywhere.  He is on narcotics for a long time now, and he is followed by his primary care physician. Patient denies fevers, sweats or chills.  Weight stable.      Medical hx Surgical hx   Past Medical History:   Diagnosis Date     DJD (degenerative joint disease)      Hypothyroid      Long term (current) use of opiate analgesic       No past surgical history on file.       Medications  Prior to Admission medications    Medication Sig Start Date End Date Taking?  Authorizing Provider   artificial saliva (BIOTENE MT) SOLN solution Swish and spit in mouth as needed for dry mouth   Yes Reported, Patient   cyclobenzaprine (FLEXERIL) 10 MG tablet Take 1 Tab by mouth 3 times a day as needed for Muscle Spasms. 11/10/17  Yes Reported, Patient   diclofenac (VOLTAREN) 1 % GEL topical gel Place onto the skin daily as needed for moderate pain   Yes Reported, Patient   ferrous sulfate (IRON) 325 (65 FE) MG tablet Take 325 mg by mouth 6/27/16  Yes Reported, Patient   furosemide (LASIX) 20 MG tablet Take 40 mg in a.m. and 20 mg in afternoon 8/30/17  Yes Reported, Patient   lactulose (CHRONULAC) 10 GM/15ML solution Take 30 mL by mouth three times daily 8/30/17  Yes Reported, Patient   levothyroxine (SYNTHROID/LEVOTHROID) 50 MCG tablet Take 50 mcg by mouth 3/27/17  Yes Reported, Patient   Magnesium 65 MG TABS Take 64 mg by mouth 8/30/17  Yes Reported, Patient   modafinil (PROVIGIL) 100 MG tablet Take 100 mg by mouth daily   Yes Reported, Patient   Multiple Vitamins-Minerals (MULTIVITAMIN PO) Take 1 tablet by mouth daily   Yes Reported, Patient   omeprazole (PRILOSEC OTC) 20 MG tablet Take 20 mg by mouth 2 times daily  8/30/17  Yes Reported, Patient   oxyCODONE IR (ROXICODONE) 15 MG tablet Take 15-30 mg by mouth every 6 hours as needed for moderate to severe pain   Yes Reported, Patient   propranolol (INDERAL) 10 MG tablet Take 10 mg by mouth 3 times daily  10/23/17  Yes Reported, Patient   rifaximin (XIFAXAN) 550 MG TABS tablet Take 550 mg by mouth 2 times daily  11/30/17  Yes Reported, Patient   spironolactone (ALDACTONE) 50 MG tablet Take 25 mg by mouth daily  8/30/17  Yes Reported, Patient       Allergies  Allergies   Allergen Reactions     Aspirin Other (See Comments)     Pt states that NSAIDS severely lowers his platelets     Nsaids Other (See Comments)     Pt states that NSAIDS severely lowers his platelets     Amoxicillin Swelling       Review of systems  A 10-point review of systems  "was negative    Examination  /64 (BP Location: Right arm, Patient Position: Sitting, Cuff Size: Adult Regular)  Pulse 57  Temp 98.2  F (36.8  C) (Oral)  Resp 18  Ht 1.727 m (5' 8\")  Wt 87 kg (191 lb 14.4 oz)  SpO2 97%  BMI 29.18 kg/m2  Body mass index is 29.18 kg/(m^2).    Gen- well, NAD, A+Ox3, normal color  Lym- no palpable LAD  CVS- RRR  RS- CTA  Abd- Obese. No shifting dullness noted.  Extr- hands normal, no TIMOTHY  Skin- no rash or jaundice  Neuro- no asterixis  Psych- normal mood    Laboratory  Lab Results   Component Value Date     05/09/2018    POTASSIUM 4.0 05/09/2018    CHLORIDE 99 05/09/2018    CO2 28 05/09/2018    BUN 14 05/09/2018    CR 1.08 05/09/2018       Lab Results   Component Value Date    BILITOTAL 2.2 05/09/2018    ALT 18 05/09/2018    AST 23 05/09/2018    ALKPHOS 129 05/09/2018       Lab Results   Component Value Date    ALBUMIN 2.7 05/09/2018    PROTTOTAL 6.6 05/09/2018        Lab Results   Component Value Date    WBC 4.2 05/09/2018    HGB 10.6 05/09/2018    MCV 99 05/09/2018    PLT 55 05/09/2018       Lab Results   Component Value Date    INR 1.80 05/09/2018     MELD-Na score: 20 at 5/9/2018  7:32 AM  MELD score: 17 at 5/9/2018  7:32 AM  Calculated from:  Serum Creatinine: 1.08 mg/dL at 5/9/2018  7:32 AM  Serum Sodium: 133 mmol/L at 5/9/2018  7:32 AM  Total Bilirubin: 2.2 mg/dL at 5/9/2018  7:32 AM  INR(ratio): 1.80 at 5/9/2018  7:32 AM  Age: 58 years    Radiology    ASSESSMENT AND PLAN:  Mr. Ibarra is a 58-year-old male who carries a diagnosis of alcoholic cirrhosis.  He has a MELD sodium of 20 and a MELD score of 17.  He is above the listing criteria, so he qualifies regarding that.  His main issue for not being listed so far is his evaluation by the  , which he is going to see today.  He has blood type AB, so if he gets listed then he will most likely be transplanted with a low MELD score.  Anyway, Janny will summarize regarding that.  We will send him to " the Pain Center here and see if he could be weaned off from the pain medications.  We will get him that appointment.  Regarding other health care maintenance, he will follow locally.  For his esophageal varices, considering his recent bleed they will be doing surveillance EGDs.  For surveillance for HCC he will have an abdominal ultrasound sometime in June.  For his other medical issues, he will follow with his primary care physician.      This was a 25-minute visit of which more than 50% was spent in explaining to the patient and his wife what our plan of care was.  We answered all his questions.         Angelito Dietz MD  Hepatology  [facility]    Again, thank you for allowing me to participate in the care of your patient.      Sincerely,    Angelito Dietz MD

## 2018-05-09 NOTE — Clinical Note
Needs RTC with Dr. Dietz in 3 months with pain clinic referral. Thanks Rosalba Peres, RONN,RN Adult Liver Coordinator [phone]

## 2018-05-09 NOTE — LETTER
5/9/2018      RE: Charanjit Ibarra  [address] [facility] [address]       [facility]    Hepatology follow-up    CHIEF COMPLAINT AND REASON FOR THE VISIT:  Alcoholic cirrhosis.      SUBJECTIVE:  Mr. Ibarra is a 58-year-old  male who comes here for transplant evaluation.  He has a diagnosis of alcoholic liver disease which was made in 2014.  He was seen locally by gastroenterologist, and in fact, he was evaluated in [place].  They thought at that time he was doing quite well, and he was not considered for transplantation.  Anyway, since I last saw him, he had an admission on 02/27/2017 for a GI bleed which was identified as relating with the variceal bleed, and he got banded, but otherwise he for almost a year did not require any paracentesis.  He has off and on edema.  He did not have after that initial bleed any melena or hematemesis.  He is not claiming to be jaundiced.  He has no pruritus, and above all, he has no confusion or memory issues, although he has some sleep cycle disturbances and he takes lactulose.  He also denies other GI symptoms including abdominal pain, nausea or vomiting, and with the lactulose he is having like 3 bowel movements.  Mr. Ibarra also tells us that he has chronic pain almost everywhere.  He is on narcotics for a long time now, and he is followed by his primary care physician. Patient denies fevers, sweats or chills.  Weight stable.      Medical hx Surgical hx   Past Medical History:   Diagnosis Date     DJD (degenerative joint disease)      Hypothyroid      Long term (current) use of opiate analgesic       No past surgical history on file.       Medications  Prior to Admission medications    Medication Sig Start Date End Date Taking? Authorizing Provider   artificial saliva (BIOTENE MT) SOLN solution Swish and spit in mouth as needed for dry mouth   Yes Reported, Patient   cyclobenzaprine (FLEXERIL) 10 MG tablet Take 1 Tab by mouth 3  "times a day as needed for Muscle Spasms. 11/10/17  Yes Reported, Patient   diclofenac (VOLTAREN) 1 % GEL topical gel Place onto the skin daily as needed for moderate pain   Yes Reported, Patient   ferrous sulfate (IRON) 325 (65 FE) MG tablet Take 325 mg by mouth 6/27/16  Yes Reported, Patient   furosemide (LASIX) 20 MG tablet Take 40 mg in a.m. and 20 mg in afternoon 8/30/17  Yes Reported, Patient   lactulose (CHRONULAC) 10 GM/15ML solution Take 30 mL by mouth three times daily 8/30/17  Yes Reported, Patient   levothyroxine (SYNTHROID/LEVOTHROID) 50 MCG tablet Take 50 mcg by mouth 3/27/17  Yes Reported, Patient   Magnesium 65 MG TABS Take 64 mg by mouth 8/30/17  Yes Reported, Patient   modafinil (PROVIGIL) 100 MG tablet Take 100 mg by mouth daily   Yes Reported, Patient   Multiple Vitamins-Minerals (MULTIVITAMIN PO) Take 1 tablet by mouth daily   Yes Reported, Patient   omeprazole (PRILOSEC OTC) 20 MG tablet Take 20 mg by mouth 2 times daily  8/30/17  Yes Reported, Patient   oxyCODONE IR (ROXICODONE) 15 MG tablet Take 15-30 mg by mouth every 6 hours as needed for moderate to severe pain   Yes Reported, Patient   propranolol (INDERAL) 10 MG tablet Take 10 mg by mouth 3 times daily  10/23/17  Yes Reported, Patient   rifaximin (XIFAXAN) 550 MG TABS tablet Take 550 mg by mouth 2 times daily  11/30/17  Yes Reported, Patient   spironolactone (ALDACTONE) 50 MG tablet Take 25 mg by mouth daily  8/30/17  Yes Reported, Patient       Allergies  Allergies   Allergen Reactions     Aspirin Other (See Comments)     Pt states that NSAIDS severely lowers his platelets     Nsaids Other (See Comments)     Pt states that NSAIDS severely lowers his platelets     Amoxicillin Swelling       Review of systems  A 10-point review of systems was negative    Examination  /64 (BP Location: Right arm, Patient Position: Sitting, Cuff Size: Adult Regular)  Pulse 57  Temp 98.2  F (36.8  C) (Oral)  Resp 18  Ht 1.727 m (5' 8\")  Wt 87 kg " (191 lb 14.4 oz)  SpO2 97%  BMI 29.18 kg/m2  Body mass index is 29.18 kg/(m^2).    Gen- well, NAD, A+Ox3, normal color  Lym- no palpable LAD  CVS- RRR  RS- CTA  Abd- Obese. No shifting dullness noted.  Extr- hands normal, no TIMOTHY  Skin- no rash or jaundice  Neuro- no asterixis  Psych- normal mood    Laboratory  Lab Results   Component Value Date     05/09/2018    POTASSIUM 4.0 05/09/2018    CHLORIDE 99 05/09/2018    CO2 28 05/09/2018    BUN 14 05/09/2018    CR 1.08 05/09/2018       Lab Results   Component Value Date    BILITOTAL 2.2 05/09/2018    ALT 18 05/09/2018    AST 23 05/09/2018    ALKPHOS 129 05/09/2018       Lab Results   Component Value Date    ALBUMIN 2.7 05/09/2018    PROTTOTAL 6.6 05/09/2018        Lab Results   Component Value Date    WBC 4.2 05/09/2018    HGB 10.6 05/09/2018    MCV 99 05/09/2018    PLT 55 05/09/2018       Lab Results   Component Value Date    INR 1.80 05/09/2018     MELD-Na score: 20 at 5/9/2018  7:32 AM  MELD score: 17 at 5/9/2018  7:32 AM  Calculated from:  Serum Creatinine: 1.08 mg/dL at 5/9/2018  7:32 AM  Serum Sodium: 133 mmol/L at 5/9/2018  7:32 AM  Total Bilirubin: 2.2 mg/dL at 5/9/2018  7:32 AM  INR(ratio): 1.80 at 5/9/2018  7:32 AM  Age: 58 years    Radiology    ASSESSMENT AND PLAN:  Mr. Ibarra is a 58-year-old male who carries a diagnosis of alcoholic cirrhosis.  He has a MELD sodium of 20 and a MELD score of 17.  He is above the listing criteria, so he qualifies regarding that.  His main issue for not being listed so far is his evaluation by the  , which he is going to see today.  He has blood type AB, so if he gets listed then he will most likely be transplanted with a low MELD score.  Anyway, Janny will summarize regarding that.  We will send him to the Pain Center here and see if he could be weaned off from the pain medications.  We will get him that appointment.  Regarding other health care maintenance, he will follow locally.  For his esophageal  varices, considering his recent bleed they will be doing surveillance EGDs.  For surveillance for HCC he will have an abdominal ultrasound sometime in June.  For his other medical issues, he will follow with his primary care physician.      This was a 25-minute visit of which more than 50% was spent in explaining to the patient and his wife what our plan of care was.  We answered all his questions.         Angelito Dietz MD  Hepatology  [facility]    Angelito Dietz MD

## 2018-05-09 NOTE — PROGRESS NOTES
"Patient seen in clinic with hepatologist,  and this writer. Plan: teeth pulled and then start Boniva for osteoporosis, pain clinic for long history of pain medications and start Calcium/Vit D. Nasir voices complaints with past dentist not doing a thorough job, with his last GI not doing banding correctly resulting in GI bleed and a hospital stay when \"I was kept there for a month because they did not put my blood on ice and my ammonia was high\". He also reports 4 other pain clinic assessment.  "

## 2018-05-09 NOTE — MR AVS SNAPSHOT
After Visit Summary   5/9/2018    Charanjit Ibarra    MRN: [id number]           Patient Information     Date Of Birth          1960        Visit Information        Provider Department      5/9/2018 9:00 AM Angelito Dietz MD [facility] Hepatology         Follow-ups after your visit        Follow-up notes from your care team     Return in about 3 months (around 8/9/2018).      Your next 10 appointments already scheduled     Aug 15, 2018 10:00 AM CDT   Lab with  LAB   [facility] Lab ([facility])    [address]  1st Floor  [facility] [address] [phone]            Aug 15, 2018 11:00 AM CDT   (Arrive by 10:45 AM)   Return General Liver with Angelito Dietz MD   [facility] Hepatology ([facility])    [address] [place] [address]  [address]  [facility] [address] [phone]              Who to contact     If you have questions or need follow up information about today's clinic visit or your schedule please contact [facility] HEPATOLOGY directly at [phone].  Normal or non-critical lab and imaging results will be communicated to you by MyChart, letter or phone within 4 business days after the clinic has received the results. If you do not hear from us within 7 days, please contact the clinic through [employer] or phone. If you have a critical or abnormal lab result, we will notify you by phone as soon as possible.  Submit refill requests through [employer] or call your pharmacy and they will forward the refill request to us. Please allow 3 business days for your refill to be completed.          Additional Information About Your Visit        MyChart Information     [employer] gives you secure access to your electronic health record. If you see a primary care provider, you can also send messages to your care team and make appointments. If you have questions, please call your primary care clinic.  If you do not have a  "primary care provider, please call [phone] and they will assist you.        Care EveryWhere ID     This is your Care EveryWhere ID. This could be used by other organizations to access your [place] medical records  [id number]        Your Vitals Were     Pulse Temperature Respirations Height Pulse Oximetry BMI (Body Mass Index)    57 98.2  F (36.8  C) (Oral) 18 1.727 m (5' 8\") 97% 29.18 kg/m2       Blood Pressure from Last 3 Encounters:   05/09/18 112/64   01/29/18 113/68   12/19/17 131/80    Weight from Last 3 Encounters:   05/09/18 87 kg (191 lb 14.4 oz)   01/29/18 79.4 kg (175 lb)   12/19/17 83.4 kg (183 lb 14.4 oz)              Today, you had the following     No orders found for display      Information about OPIOIDS     PRESCRIPTION OPIOIDS: WHAT YOU NEED TO KNOW   You have a prescription for an opioid (narcotic) pain medicine. Opioids can cause addiction. If you have a history of chemical dependency of any type, you are at a higher risk of becoming addicted to opioids. Only take this medicine after all other options have been tried. Take it for as short a time and as few doses as possible.     Do not:    Drive. If you drive while taking these medicines, you could be arrested for driving under the influence (DUI).    Operate heavy machinery    Do any other dangerous activities while taking these medicines.     Drink any alcohol while taking these medicines.      Take with any other medicines that contain acetaminophen. Read all labels carefully. Look for the word  acetaminophen  or  Tylenol.  Ask your pharmacist if you have questions or are unsure.    Store your pills in a secure place, locked if possible. We will not replace any lost or stolen medicine. If you don t finish your medicine, please throw away (dispose) as directed by your pharmacist. The [place] Pollution Control Agency has more information about safe disposal: https://www.pca.state.[place]/living-green/managing-unwanted-medications    All " opioids tend to cause constipation. Drink plenty of water and eat foods that have a lot of fiber, such as fruits, vegetables, prune juice, apple juice and high-fiber cereal. Take a laxative (Miralax, milk of magnesia, Colace, Senna) if you don t move your bowels at least every other day.          Primary Care Provider Fax #    Physician No Ref-Primary [phone]       No address on file        Equal Access to Services     FABIO MAXWELL : Hadzane mahano Socliffali, waaxda luqadaha, qaybta kaalmada adeaudreyyada, lena hay cecillore archuleta abigail atif . [facility] [phone].    ATENCIÓN: Si habla español, tiene a hall disposición servicios gratuitos de asistencia lingüística. Anabelleame al [phone].    We comply with applicable federal civil rights laws and [place] laws. We do not discriminate on the basis of race, color, national origin, age, disability, sex, sexual orientation, or gender identity.            Thank you!     Thank you for choosing [facility] HEPATOLOGY  for your care. Our goal is always to provide you with excellent care. Hearing back from our patients is one way we can continue to improve our services. Please take a few minutes to complete the written survey that you may receive in the mail after your visit with us. Thank you!             Your Updated Medication List - Protect others around you: Learn how to safely use, store and throw away your medicines at www.disposemymeds.org.          This list is accurate as of 5/9/18 11:09 AM.  Always use your most recent med list.                   Brand Name Dispense Instructions for use Diagnosis    artificial saliva Soln solution      Swish and spit in mouth as needed for dry mouth        cyclobenzaprine 10 MG tablet    FLEXERIL     Take 1 Tab by mouth 3 times a day as needed for Muscle Spasms.        diclofenac 1 % Gel topical gel    VOLTAREN     Place onto the skin daily as needed for moderate pain        ferrous sulfate 325 (65 Fe) MG tablet    IRON      Take 325 mg by mouth        furosemide 20 MG tablet    LASIX     Take 40 mg in a.m. and 20 mg in afternoon        lactulose 10 GM/15ML solution    CHRONULAC     Take 30 mL by mouth three times daily        levothyroxine 50 MCG tablet    SYNTHROID/LEVOTHROID     Take 50 mcg by mouth        Magnesium 65 MG Tabs      Take 64 mg by mouth        MULTIVITAMIN PO      Take 1 tablet by mouth daily        omeprazole 20 MG tablet    priLOSEC OTC     Take 20 mg by mouth 2 times daily        oxyCODONE IR 15 MG tablet    ROXICODONE     Take 15-30 mg by mouth every 6 hours as needed for moderate to severe pain        propranolol 10 MG tablet    INDERAL     Take 10 mg by mouth 3 times daily        PROVIGIL 100 MG tablet   Generic drug:  modafinil      Take 100 mg by mouth daily        spironolactone 50 MG tablet    ALDACTONE     Take 25 mg by mouth daily        XIFAXAN 550 MG Tabs tablet   Generic drug:  rifaximin      Take 550 mg by mouth 2 times daily

## 2018-05-10 LAB — ETHYL GLUCURONIDE UR QL: NEGATIVE

## 2018-05-14 ENCOUNTER — DOCUMENTATION ONLY (OUTPATIENT)
Dept: TRANSPLANT | Facility: CLINIC | Age: 58
End: 2018-05-14

## 2018-07-19 ENCOUNTER — TELEPHONE (OUTPATIENT)
Dept: GASTROENTEROLOGY | Facility: CLINIC | Age: 58
End: 2018-07-19

## 2018-07-19 NOTE — TELEPHONE ENCOUNTER
[facility] Call Center    Phone Message    May a detailed message be left on voicemail: yes    Reason for Call: Other: [facility] is calling to report the fax number for [facility] in [place] for the orders for the lab work needed for 02/05/2019 to be sent to that is [phone] if you need to speak with someone there you can call [phone].       Action Taken: Message routed to:  [facility] ([facility] Hepatology

## 2018-07-20 NOTE — TELEPHONE ENCOUNTER
Left message with Charanjit informing him lab work is typically done same day as your hepatology visit. To call back with concerns or questions.

## 2018-08-22 ENCOUNTER — TELEPHONE (OUTPATIENT)
Dept: TRANSPLANT | Facility: CLINIC | Age: 58
End: 2018-08-22

## 2018-08-22 DIAGNOSIS — K70.31 ALCOHOLIC CIRRHOSIS OF LIVER WITH ASCITES (H): Primary | ICD-10-CM

## 2018-08-22 NOTE — TELEPHONE ENCOUNTER
Spoke with Luna who reports they are unable to come prior to February 2019 due to keeping their schedule open for other important things like dental care. He did consult with pain clinic in [facility] and was unwilling to wean off oxycodone as recommended. This RN explained how that is a concern with transplant. It will make it extremely challenging for post operative pain control and transplant will not improve his quality of life related to over all pain, instead it will only negatively impacted. Patient states he is not addicted and pain clinics all same the same thing.    He agrees to surgeon visit, , imaging, labs in addition to the hepatology appointment in 2019.

## 2019-01-01 ENCOUNTER — APPOINTMENT (OUTPATIENT)
Dept: GENERAL RADIOLOGY | Facility: CLINIC | Age: 59
DRG: 432 | End: 2019-01-01
Attending: PHYSICIAN ASSISTANT
Payer: MEDICARE

## 2019-01-01 ENCOUNTER — TELEPHONE (OUTPATIENT)
Dept: TRANSPLANT | Facility: CLINIC | Age: 59
End: 2019-01-01

## 2019-01-01 ENCOUNTER — APPOINTMENT (OUTPATIENT)
Dept: NUCLEAR MEDICINE | Facility: CLINIC | Age: 59
DRG: 432 | End: 2019-01-01
Attending: PHYSICIAN ASSISTANT
Payer: MEDICARE

## 2019-01-01 ENCOUNTER — TELEPHONE (OUTPATIENT)
Dept: GASTROENTEROLOGY | Facility: CLINIC | Age: 59
End: 2019-01-01

## 2019-01-01 ENCOUNTER — DOCUMENTATION ONLY (OUTPATIENT)
Dept: TRANSPLANT | Facility: CLINIC | Age: 59
End: 2019-01-01

## 2019-01-01 ENCOUNTER — OFFICE VISIT (OUTPATIENT)
Dept: TRANSPLANT | Facility: CLINIC | Age: 59
End: 2019-01-01
Attending: TRANSPLANT SURGERY
Payer: MEDICARE

## 2019-01-01 ENCOUNTER — APPOINTMENT (OUTPATIENT)
Dept: CARDIOLOGY | Facility: CLINIC | Age: 59
DRG: 432 | End: 2019-01-01
Attending: PHYSICIAN ASSISTANT
Payer: MEDICARE

## 2019-01-01 ENCOUNTER — COMMITTEE REVIEW (OUTPATIENT)
Dept: TRANSPLANT | Facility: CLINIC | Age: 59
End: 2019-01-01

## 2019-01-01 ENCOUNTER — ANESTHESIA (OUTPATIENT)
Dept: SURGERY | Facility: CLINIC | Age: 59
DRG: 432 | End: 2019-01-01
Payer: MEDICARE

## 2019-01-01 ENCOUNTER — APPOINTMENT (OUTPATIENT)
Dept: OCCUPATIONAL THERAPY | Facility: CLINIC | Age: 59
DRG: 432 | End: 2019-01-01
Attending: INTERNAL MEDICINE
Payer: MEDICARE

## 2019-01-01 ENCOUNTER — APPOINTMENT (OUTPATIENT)
Dept: GENERAL RADIOLOGY | Facility: CLINIC | Age: 59
DRG: 432 | End: 2019-01-01
Attending: INTERNAL MEDICINE
Payer: MEDICARE

## 2019-01-01 ENCOUNTER — APPOINTMENT (OUTPATIENT)
Dept: TRANSPLANT | Facility: CLINIC | Age: 59
End: 2019-01-01
Attending: INTERNAL MEDICINE
Payer: MEDICARE

## 2019-01-01 ENCOUNTER — APPOINTMENT (OUTPATIENT)
Dept: OCCUPATIONAL THERAPY | Facility: CLINIC | Age: 59
DRG: 432 | End: 2019-01-01
Attending: PHYSICIAN ASSISTANT
Payer: MEDICARE

## 2019-01-01 ENCOUNTER — APPOINTMENT (OUTPATIENT)
Dept: SPEECH THERAPY | Facility: CLINIC | Age: 59
DRG: 432 | End: 2019-01-01
Attending: INTERNAL MEDICINE
Payer: MEDICARE

## 2019-01-01 ENCOUNTER — HOSPITAL ENCOUNTER (INPATIENT)
Facility: CLINIC | Age: 59
LOS: 9 days | DRG: 432 | End: 2020-01-02
Attending: INTERNAL MEDICINE | Admitting: STUDENT IN AN ORGANIZED HEALTH CARE EDUCATION/TRAINING PROGRAM
Payer: MEDICARE

## 2019-01-01 ENCOUNTER — DOCUMENTATION ONLY (OUTPATIENT)
Dept: CARE COORDINATION | Facility: CLINIC | Age: 59
End: 2019-01-01

## 2019-01-01 ENCOUNTER — ANESTHESIA EVENT (OUTPATIENT)
Dept: SURGERY | Facility: CLINIC | Age: 59
DRG: 432 | End: 2019-01-01
Payer: MEDICARE

## 2019-01-01 ENCOUNTER — APPOINTMENT (OUTPATIENT)
Dept: CT IMAGING | Facility: CLINIC | Age: 59
DRG: 432 | End: 2019-01-01
Attending: INTERNAL MEDICINE
Payer: MEDICARE

## 2019-01-01 ENCOUNTER — OFFICE VISIT (OUTPATIENT)
Dept: GASTROENTEROLOGY | Facility: CLINIC | Age: 59
End: 2019-01-01
Attending: INTERNAL MEDICINE
Payer: MEDICARE

## 2019-01-01 ENCOUNTER — TRANSFERRED RECORDS (OUTPATIENT)
Dept: HEALTH INFORMATION MANAGEMENT | Facility: CLINIC | Age: 59
End: 2019-01-01

## 2019-01-01 ENCOUNTER — APPOINTMENT (OUTPATIENT)
Dept: ULTRASOUND IMAGING | Facility: CLINIC | Age: 59
DRG: 432 | End: 2019-01-01
Attending: NURSE PRACTITIONER
Payer: MEDICARE

## 2019-01-01 VITALS
HEART RATE: 68 BPM | SYSTOLIC BLOOD PRESSURE: 128 MMHG | TEMPERATURE: 98.2 F | WEIGHT: 186.7 LBS | DIASTOLIC BLOOD PRESSURE: 71 MMHG | BODY MASS INDEX: 28.39 KG/M2 | OXYGEN SATURATION: 97 %

## 2019-01-01 DIAGNOSIS — K81.0 ACUTE CHOLECYSTITIS: Primary | ICD-10-CM

## 2019-01-01 DIAGNOSIS — K70.30 ALCOHOLIC CIRRHOSIS (H): Primary | ICD-10-CM

## 2019-01-01 DIAGNOSIS — Z79.899 OTHER LONG TERM (CURRENT) DRUG THERAPY: ICD-10-CM

## 2019-01-01 DIAGNOSIS — K70.30 ALCOHOLIC CIRRHOSIS OF LIVER WITHOUT ASCITES (H): Primary | ICD-10-CM

## 2019-01-01 DIAGNOSIS — F10.11 ALCOHOL ABUSE, IN REMISSION: ICD-10-CM

## 2019-01-01 DIAGNOSIS — C22.0 HCC (HEPATOCELLULAR CARCINOMA) (H): Primary | ICD-10-CM

## 2019-01-01 DIAGNOSIS — K74.60 CIRRHOSIS OF LIVER (H): Primary | ICD-10-CM

## 2019-01-01 DIAGNOSIS — K70.31 ALCOHOLIC CIRRHOSIS OF LIVER WITH ASCITES (H): ICD-10-CM

## 2019-01-01 LAB
AFP SERPL-MCNC: 3 UG/L (ref 0–8)
AFP SERPL-MCNC: <1.5 UG/L (ref 0–8)
ALBUMIN FLD-MCNC: 1.4 G/DL
ALBUMIN SERPL-MCNC: 3.2 G/DL (ref 3.4–5)
ALBUMIN SERPL-MCNC: 3.7 G/DL (ref 3.4–5)
ALBUMIN SERPL-MCNC: 3.9 G/DL (ref 3.4–5)
ALBUMIN SERPL-MCNC: 4 G/DL (ref 3.4–5)
ALBUMIN SERPL-MCNC: 4.2 G/DL (ref 3.4–5)
ALBUMIN SERPL-MCNC: 4.3 G/DL (ref 3.4–5)
ALP SERPL-CCNC: 153 U/L (ref 40–150)
ALP SERPL-CCNC: 55 U/L (ref 40–150)
ALP SERPL-CCNC: 57 U/L (ref 40–150)
ALP SERPL-CCNC: 60 U/L (ref 40–150)
ALP SERPL-CCNC: 61 U/L (ref 40–150)
ALP SERPL-CCNC: 64 U/L (ref 40–150)
ALP SERPL-CCNC: 72 U/L (ref 40–150)
ALP SERPL-CCNC: 72 U/L (ref 40–150)
ALP SERPL-CCNC: 73 U/L (ref 40–150)
ALT SERPL W P-5'-P-CCNC: 19 U/L (ref 0–70)
ALT SERPL W P-5'-P-CCNC: 20 U/L (ref 0–70)
ALT SERPL W P-5'-P-CCNC: 21 U/L (ref 0–70)
ALT SERPL W P-5'-P-CCNC: 21 U/L (ref 0–70)
ALT SERPL W P-5'-P-CCNC: 23 U/L (ref 0–70)
ALT SERPL W P-5'-P-CCNC: 23 U/L (ref 0–70)
ALT SERPL W P-5'-P-CCNC: 24 U/L (ref 0–70)
ALT SERPL W P-5'-P-CCNC: 27 U/L (ref 0–70)
ALT SERPL W P-5'-P-CCNC: 34 U/L (ref 0–70)
AMMONIA PLAS-SCNC: 19 UMOL/L (ref 10–50)
AMMONIA PLAS-SCNC: 29 UMOL/L (ref 10–50)
ANION GAP SERPL CALCULATED.3IONS-SCNC: 10 MMOL/L (ref 3–14)
ANION GAP SERPL CALCULATED.3IONS-SCNC: 11 MMOL/L (ref 3–14)
ANION GAP SERPL CALCULATED.3IONS-SCNC: 11 MMOL/L (ref 3–14)
ANION GAP SERPL CALCULATED.3IONS-SCNC: 13 MMOL/L (ref 3–14)
ANION GAP SERPL CALCULATED.3IONS-SCNC: 6 MMOL/L (ref 3–14)
ANION GAP SERPL CALCULATED.3IONS-SCNC: 8 MMOL/L (ref 3–14)
ANION GAP SERPL CALCULATED.3IONS-SCNC: 9 MMOL/L (ref 3–14)
APPEARANCE FLD: NORMAL
AST SERPL W P-5'-P-CCNC: 27 U/L (ref 0–45)
AST SERPL W P-5'-P-CCNC: 28 U/L (ref 0–45)
AST SERPL W P-5'-P-CCNC: 29 U/L (ref 0–45)
AST SERPL W P-5'-P-CCNC: 29 U/L (ref 0–45)
AST SERPL W P-5'-P-CCNC: 32 U/L (ref 0–45)
AST SERPL W P-5'-P-CCNC: 47 U/L (ref 0–45)
AST SERPL W P-5'-P-CCNC: 47 U/L (ref 0–45)
AST SERPL W P-5'-P-CCNC: 61 U/L (ref 0–45)
AST SERPL W P-5'-P-CCNC: 63 U/L (ref 0–45)
BACTERIA SPEC CULT: ABNORMAL
BACTERIA SPEC CULT: ABNORMAL
BACTERIA SPEC CULT: NO GROWTH
BACTERIA SPEC CULT: NO GROWTH
BASOPHILS # BLD AUTO: 0 10E9/L (ref 0–0.2)
BASOPHILS # BLD AUTO: 0 10E9/L (ref 0–0.2)
BASOPHILS NFR BLD AUTO: 0.1 %
BASOPHILS NFR BLD AUTO: 0.2 %
BILIRUB DIRECT SERPL-MCNC: 1.2 MG/DL (ref 0–0.2)
BILIRUB DIRECT SERPL-MCNC: 7.8 MG/DL (ref 0–0.2)
BILIRUB FLD-MCNC: 3.8 MG/DL
BILIRUB SERPL-MCNC: 12.3 MG/DL (ref 0.2–1.3)
BILIRUB SERPL-MCNC: 13.2 MG/DL (ref 0.2–1.3)
BILIRUB SERPL-MCNC: 13.9 MG/DL (ref 0.2–1.3)
BILIRUB SERPL-MCNC: 14.5 MG/DL (ref 0.2–1.3)
BILIRUB SERPL-MCNC: 14.6 MG/DL (ref 0.2–1.3)
BILIRUB SERPL-MCNC: 15.5 MG/DL (ref 0.2–1.3)
BILIRUB SERPL-MCNC: 16.4 MG/DL (ref 0.2–1.3)
BILIRUB SERPL-MCNC: 16.9 MG/DL (ref 0.2–1.3)
BILIRUB SERPL-MCNC: 5.1 MG/DL (ref 0.2–1.3)
BLD PROD TYP BPU: NORMAL
BLD UNIT ID BPU: 0
BLOOD PRODUCT CODE: NORMAL
BPU ID: NORMAL
BUN SERPL-MCNC: 18 MG/DL (ref 7–30)
BUN SERPL-MCNC: 36 MG/DL (ref 7–30)
BUN SERPL-MCNC: 38 MG/DL (ref 7–30)
BUN SERPL-MCNC: 39 MG/DL (ref 7–30)
BUN SERPL-MCNC: 44 MG/DL (ref 7–30)
BUN SERPL-MCNC: 45 MG/DL (ref 7–30)
BUN SERPL-MCNC: 45 MG/DL (ref 7–30)
BUN SERPL-MCNC: 46 MG/DL (ref 7–30)
BUN SERPL-MCNC: 47 MG/DL (ref 7–30)
BUN SERPL-MCNC: 50 MG/DL (ref 7–30)
C DIFF TOX B STL QL: NEGATIVE
CALCIUM SERPL-MCNC: 10 MG/DL (ref 8.5–10.1)
CALCIUM SERPL-MCNC: 10.1 MG/DL (ref 8.5–10.1)
CALCIUM SERPL-MCNC: 10.4 MG/DL (ref 8.5–10.1)
CALCIUM SERPL-MCNC: 10.6 MG/DL (ref 8.5–10.1)
CALCIUM SERPL-MCNC: 10.8 MG/DL (ref 8.5–10.1)
CALCIUM SERPL-MCNC: 8.7 MG/DL (ref 8.5–10.1)
CALCIUM SERPL-MCNC: 8.8 MG/DL (ref 8.5–10.1)
CALCIUM SERPL-MCNC: 9.4 MG/DL (ref 8.5–10.1)
CALCIUM SERPL-MCNC: 9.5 MG/DL (ref 8.5–10.1)
CALCIUM SERPL-MCNC: 9.8 MG/DL (ref 8.5–10.1)
CHLORIDE SERPL-SCNC: 100 MMOL/L (ref 94–109)
CHLORIDE SERPL-SCNC: 100 MMOL/L (ref 94–109)
CHLORIDE SERPL-SCNC: 102 MMOL/L (ref 94–109)
CHLORIDE SERPL-SCNC: 103 MMOL/L (ref 94–109)
CHLORIDE SERPL-SCNC: 104 MMOL/L (ref 94–109)
CHLORIDE SERPL-SCNC: 109 MMOL/L (ref 94–109)
CHLORIDE SERPL-SCNC: 112 MMOL/L (ref 94–109)
CHLORIDE SERPL-SCNC: 114 MMOL/L (ref 94–109)
CHLORIDE SERPL-SCNC: 114 MMOL/L (ref 94–109)
CHLORIDE SERPL-SCNC: 122 MMOL/L (ref 94–109)
CO2 SERPL-SCNC: 17 MMOL/L (ref 20–32)
CO2 SERPL-SCNC: 18 MMOL/L (ref 20–32)
CO2 SERPL-SCNC: 19 MMOL/L (ref 20–32)
CO2 SERPL-SCNC: 20 MMOL/L (ref 20–32)
CO2 SERPL-SCNC: 20 MMOL/L (ref 20–32)
CO2 SERPL-SCNC: 26 MMOL/L (ref 20–32)
COLOR FLD: NORMAL
COPATH REPORT: NORMAL
CREAT SERPL-MCNC: 0.89 MG/DL (ref 0.66–1.25)
CREAT SERPL-MCNC: 0.98 MG/DL (ref 0.66–1.25)
CREAT SERPL-MCNC: 1.03 MG/DL (ref 0.66–1.25)
CREAT SERPL-MCNC: 1.06 MG/DL (ref 0.66–1.25)
CREAT SERPL-MCNC: 1.08 MG/DL (ref 0.66–1.25)
CREAT SERPL-MCNC: 1.08 MG/DL (ref 0.66–1.25)
CREAT SERPL-MCNC: 1.11 MG/DL (ref 0.66–1.25)
CREAT SERPL-MCNC: 1.13 MG/DL (ref 0.66–1.25)
CREAT SERPL-MCNC: 1.13 MG/DL (ref 0.66–1.25)
CREAT SERPL-MCNC: 1.21 MG/DL (ref 0.66–1.25)
CRP SERPL-MCNC: 110 MG/L (ref 0–8)
CRP SERPL-MCNC: 120 MG/L (ref 0–8)
DEPRECATED CALCIDIOL+CALCIFEROL SERPL-MC: 25 UG/L (ref 20–75)
DIFFERENTIAL METHOD BLD: ABNORMAL
DIFFERENTIAL METHOD BLD: ABNORMAL
EOSINOPHIL # BLD AUTO: 0 10E9/L (ref 0–0.7)
EOSINOPHIL # BLD AUTO: 0.1 10E9/L (ref 0–0.7)
EOSINOPHIL NFR BLD AUTO: 0 %
EOSINOPHIL NFR BLD AUTO: 0.7 %
ERCP: NORMAL
ERYTHROCYTE [DISTWIDTH] IN BLOOD BY AUTOMATED COUNT: 16 % (ref 10–15)
ERYTHROCYTE [DISTWIDTH] IN BLOOD BY AUTOMATED COUNT: 16 % (ref 10–15)
ERYTHROCYTE [DISTWIDTH] IN BLOOD BY AUTOMATED COUNT: 16.4 % (ref 10–15)
ERYTHROCYTE [DISTWIDTH] IN BLOOD BY AUTOMATED COUNT: 16.5 % (ref 10–15)
ERYTHROCYTE [DISTWIDTH] IN BLOOD BY AUTOMATED COUNT: 16.6 % (ref 10–15)
ERYTHROCYTE [DISTWIDTH] IN BLOOD BY AUTOMATED COUNT: 16.8 % (ref 10–15)
ERYTHROCYTE [DISTWIDTH] IN BLOOD BY AUTOMATED COUNT: 17.1 % (ref 10–15)
ERYTHROCYTE [DISTWIDTH] IN BLOOD BY AUTOMATED COUNT: 17.2 % (ref 10–15)
ERYTHROCYTE [DISTWIDTH] IN BLOOD BY AUTOMATED COUNT: 18.1 % (ref 10–15)
ERYTHROCYTE [DISTWIDTH] IN BLOOD BY AUTOMATED COUNT: 19.1 % (ref 10–15)
FIBRINOGEN PPP-MCNC: 293 MG/DL (ref 200–420)
GFR SERPL CREATININE-BSD FRML MDRD: 64 ML/MIN/{1.73_M2}
GFR SERPL CREATININE-BSD FRML MDRD: 70 ML/MIN/{1.73_M2}
GFR SERPL CREATININE-BSD FRML MDRD: 70 ML/MIN/{1.73_M2}
GFR SERPL CREATININE-BSD FRML MDRD: 71 ML/MIN/{1.73_M2}
GFR SERPL CREATININE-BSD FRML MDRD: 74 ML/MIN/{1.73_M2}
GFR SERPL CREATININE-BSD FRML MDRD: 74 ML/MIN/{1.73_M2}
GFR SERPL CREATININE-BSD FRML MDRD: 75 ML/MIN/{1.73_M2}
GFR SERPL CREATININE-BSD FRML MDRD: 78 ML/MIN/{1.73_M2}
GFR SERPL CREATININE-BSD FRML MDRD: 82 ML/MIN/{1.73_M2}
GFR SERPL CREATININE-BSD FRML MDRD: >90 ML/MIN/{1.73_M2}
GLUCOSE BLDC GLUCOMTR-MCNC: 121 MG/DL (ref 70–99)
GLUCOSE BLDC GLUCOMTR-MCNC: 124 MG/DL (ref 70–99)
GLUCOSE BLDC GLUCOMTR-MCNC: 134 MG/DL (ref 70–99)
GLUCOSE BLDC GLUCOMTR-MCNC: 135 MG/DL (ref 70–99)
GLUCOSE BLDC GLUCOMTR-MCNC: 141 MG/DL (ref 70–99)
GLUCOSE BLDC GLUCOMTR-MCNC: 144 MG/DL (ref 70–99)
GLUCOSE BLDC GLUCOMTR-MCNC: 146 MG/DL (ref 70–99)
GLUCOSE BLDC GLUCOMTR-MCNC: 150 MG/DL (ref 70–99)
GLUCOSE BLDC GLUCOMTR-MCNC: 152 MG/DL (ref 70–99)
GLUCOSE BLDC GLUCOMTR-MCNC: 154 MG/DL (ref 70–99)
GLUCOSE BLDC GLUCOMTR-MCNC: 156 MG/DL (ref 70–99)
GLUCOSE BLDC GLUCOMTR-MCNC: 158 MG/DL (ref 70–99)
GLUCOSE BLDC GLUCOMTR-MCNC: 159 MG/DL (ref 70–99)
GLUCOSE BLDC GLUCOMTR-MCNC: 163 MG/DL (ref 70–99)
GLUCOSE BLDC GLUCOMTR-MCNC: 167 MG/DL (ref 70–99)
GLUCOSE BLDC GLUCOMTR-MCNC: 168 MG/DL (ref 70–99)
GLUCOSE BLDC GLUCOMTR-MCNC: 169 MG/DL (ref 70–99)
GLUCOSE BLDC GLUCOMTR-MCNC: 171 MG/DL (ref 70–99)
GLUCOSE BLDC GLUCOMTR-MCNC: 173 MG/DL (ref 70–99)
GLUCOSE BLDC GLUCOMTR-MCNC: 174 MG/DL (ref 70–99)
GLUCOSE BLDC GLUCOMTR-MCNC: 175 MG/DL (ref 70–99)
GLUCOSE BLDC GLUCOMTR-MCNC: 175 MG/DL (ref 70–99)
GLUCOSE BLDC GLUCOMTR-MCNC: 176 MG/DL (ref 70–99)
GLUCOSE BLDC GLUCOMTR-MCNC: 176 MG/DL (ref 70–99)
GLUCOSE BLDC GLUCOMTR-MCNC: 177 MG/DL (ref 70–99)
GLUCOSE BLDC GLUCOMTR-MCNC: 178 MG/DL (ref 70–99)
GLUCOSE BLDC GLUCOMTR-MCNC: 180 MG/DL (ref 70–99)
GLUCOSE BLDC GLUCOMTR-MCNC: 182 MG/DL (ref 70–99)
GLUCOSE BLDC GLUCOMTR-MCNC: 183 MG/DL (ref 70–99)
GLUCOSE BLDC GLUCOMTR-MCNC: 189 MG/DL (ref 70–99)
GLUCOSE BLDC GLUCOMTR-MCNC: 191 MG/DL (ref 70–99)
GLUCOSE BLDC GLUCOMTR-MCNC: 195 MG/DL (ref 70–99)
GLUCOSE BLDC GLUCOMTR-MCNC: 195 MG/DL (ref 70–99)
GLUCOSE BLDC GLUCOMTR-MCNC: 196 MG/DL (ref 70–99)
GLUCOSE BLDC GLUCOMTR-MCNC: 198 MG/DL (ref 70–99)
GLUCOSE BLDC GLUCOMTR-MCNC: 200 MG/DL (ref 70–99)
GLUCOSE BLDC GLUCOMTR-MCNC: 200 MG/DL (ref 70–99)
GLUCOSE BLDC GLUCOMTR-MCNC: 201 MG/DL (ref 70–99)
GLUCOSE BLDC GLUCOMTR-MCNC: 202 MG/DL (ref 70–99)
GLUCOSE BLDC GLUCOMTR-MCNC: 207 MG/DL (ref 70–99)
GLUCOSE BLDC GLUCOMTR-MCNC: 209 MG/DL (ref 70–99)
GLUCOSE BLDC GLUCOMTR-MCNC: 209 MG/DL (ref 70–99)
GLUCOSE BLDC GLUCOMTR-MCNC: 211 MG/DL (ref 70–99)
GLUCOSE BLDC GLUCOMTR-MCNC: 214 MG/DL (ref 70–99)
GLUCOSE BLDC GLUCOMTR-MCNC: 215 MG/DL (ref 70–99)
GLUCOSE BLDC GLUCOMTR-MCNC: 221 MG/DL (ref 70–99)
GLUCOSE BLDC GLUCOMTR-MCNC: 223 MG/DL (ref 70–99)
GLUCOSE BLDC GLUCOMTR-MCNC: 226 MG/DL (ref 70–99)
GLUCOSE BLDC GLUCOMTR-MCNC: 227 MG/DL (ref 70–99)
GLUCOSE BLDC GLUCOMTR-MCNC: 229 MG/DL (ref 70–99)
GLUCOSE BLDC GLUCOMTR-MCNC: 230 MG/DL (ref 70–99)
GLUCOSE BLDC GLUCOMTR-MCNC: 233 MG/DL (ref 70–99)
GLUCOSE BLDC GLUCOMTR-MCNC: 233 MG/DL (ref 70–99)
GLUCOSE BLDC GLUCOMTR-MCNC: 234 MG/DL (ref 70–99)
GLUCOSE BLDC GLUCOMTR-MCNC: 234 MG/DL (ref 70–99)
GLUCOSE BLDC GLUCOMTR-MCNC: 235 MG/DL (ref 70–99)
GLUCOSE BLDC GLUCOMTR-MCNC: 242 MG/DL (ref 70–99)
GLUCOSE BLDC GLUCOMTR-MCNC: 242 MG/DL (ref 70–99)
GLUCOSE BLDC GLUCOMTR-MCNC: 244 MG/DL (ref 70–99)
GLUCOSE BLDC GLUCOMTR-MCNC: 245 MG/DL (ref 70–99)
GLUCOSE BLDC GLUCOMTR-MCNC: 249 MG/DL (ref 70–99)
GLUCOSE BLDC GLUCOMTR-MCNC: 251 MG/DL (ref 70–99)
GLUCOSE BLDC GLUCOMTR-MCNC: 256 MG/DL (ref 70–99)
GLUCOSE BLDC GLUCOMTR-MCNC: 258 MG/DL (ref 70–99)
GLUCOSE BLDC GLUCOMTR-MCNC: 259 MG/DL (ref 70–99)
GLUCOSE BLDC GLUCOMTR-MCNC: 266 MG/DL (ref 70–99)
GLUCOSE BLDC GLUCOMTR-MCNC: 288 MG/DL (ref 70–99)
GLUCOSE BLDC GLUCOMTR-MCNC: 291 MG/DL (ref 70–99)
GLUCOSE BLDC GLUCOMTR-MCNC: 294 MG/DL (ref 70–99)
GLUCOSE BLDC GLUCOMTR-MCNC: 295 MG/DL (ref 70–99)
GLUCOSE BLDC GLUCOMTR-MCNC: 317 MG/DL (ref 70–99)
GLUCOSE BLDC GLUCOMTR-MCNC: 341 MG/DL (ref 70–99)
GLUCOSE BLDC GLUCOMTR-MCNC: 353 MG/DL (ref 70–99)
GLUCOSE BLDC GLUCOMTR-MCNC: 366 MG/DL (ref 70–99)
GLUCOSE BLDC GLUCOMTR-MCNC: 374 MG/DL (ref 70–99)
GLUCOSE BLDC GLUCOMTR-MCNC: 379 MG/DL (ref 70–99)
GLUCOSE BLDC GLUCOMTR-MCNC: 416 MG/DL (ref 70–99)
GLUCOSE BLDC GLUCOMTR-MCNC: 423 MG/DL (ref 70–99)
GLUCOSE BLDC GLUCOMTR-MCNC: 428 MG/DL (ref 70–99)
GLUCOSE FLD-MCNC: 181 MG/DL
GLUCOSE SERPL-MCNC: 134 MG/DL (ref 70–99)
GLUCOSE SERPL-MCNC: 135 MG/DL (ref 70–99)
GLUCOSE SERPL-MCNC: 206 MG/DL (ref 70–99)
GLUCOSE SERPL-MCNC: 220 MG/DL (ref 70–99)
GLUCOSE SERPL-MCNC: 224 MG/DL (ref 70–99)
GLUCOSE SERPL-MCNC: 232 MG/DL (ref 70–99)
GLUCOSE SERPL-MCNC: 237 MG/DL (ref 70–99)
GLUCOSE SERPL-MCNC: 298 MG/DL (ref 70–99)
GLUCOSE SERPL-MCNC: 305 MG/DL (ref 70–99)
GLUCOSE SERPL-MCNC: 400 MG/DL (ref 70–99)
GRAM STN SPEC: ABNORMAL
GRAM STN SPEC: NORMAL
GRAM STN SPEC: NORMAL
HBA1C MFR BLD: 6.8 % (ref 0–5.6)
HCT VFR BLD AUTO: 22.3 % (ref 40–53)
HCT VFR BLD AUTO: 25.4 % (ref 40–53)
HCT VFR BLD AUTO: 25.5 % (ref 40–53)
HCT VFR BLD AUTO: 27.3 % (ref 40–53)
HCT VFR BLD AUTO: 27.5 % (ref 40–53)
HCT VFR BLD AUTO: 29.6 % (ref 40–53)
HCT VFR BLD AUTO: 29.6 % (ref 40–53)
HCT VFR BLD AUTO: 30.4 % (ref 40–53)
HCT VFR BLD AUTO: 32.8 % (ref 40–53)
HCT VFR BLD AUTO: 39.2 % (ref 40–53)
HGB BLD-MCNC: 10.4 G/DL (ref 13.3–17.7)
HGB BLD-MCNC: 10.4 G/DL (ref 13.3–17.7)
HGB BLD-MCNC: 11.4 G/DL (ref 13.3–17.7)
HGB BLD-MCNC: 13.3 G/DL (ref 13.3–17.7)
HGB BLD-MCNC: 7.5 G/DL (ref 13.3–17.7)
HGB BLD-MCNC: 8.4 G/DL (ref 13.3–17.7)
HGB BLD-MCNC: 8.6 G/DL (ref 13.3–17.7)
HGB BLD-MCNC: 9.4 G/DL (ref 13.3–17.7)
HGB BLD-MCNC: 9.5 G/DL (ref 13.3–17.7)
HGB BLD-MCNC: 9.6 G/DL (ref 13.3–17.7)
IMM GRANULOCYTES # BLD: 0.2 10E9/L (ref 0–0.4)
IMM GRANULOCYTES # BLD: 0.3 10E9/L (ref 0–0.4)
IMM GRANULOCYTES NFR BLD: 1.7 %
IMM GRANULOCYTES NFR BLD: 1.9 %
INR PPP: 1.85 (ref 0.86–1.14)
INR PPP: 2.21 (ref 0.86–1.14)
INR PPP: 2.22 (ref 0.86–1.14)
INR PPP: 2.33 (ref 0.86–1.14)
INR PPP: 2.45 (ref 0.86–1.14)
INR PPP: 2.47 (ref 0.86–1.14)
INR PPP: 2.49 (ref 0.86–1.14)
INR PPP: 2.84 (ref 0.86–1.14)
INTERPRETATION ECG - MUSE: NORMAL
LACTATE BLD-SCNC: 2.3 MMOL/L (ref 0.7–2)
LACTATE BLD-SCNC: 2.7 MMOL/L (ref 0.7–2)
LACTATE BLD-SCNC: 2.8 MMOL/L (ref 0.7–2)
LACTATE BLD-SCNC: 2.9 MMOL/L (ref 0.7–2)
LACTATE BLD-SCNC: 3.5 MMOL/L (ref 0.7–2)
LACTATE BLD-SCNC: 3.8 MMOL/L (ref 0.7–2)
LACTATE BLD-SCNC: 3.8 MMOL/L (ref 0.7–2)
LACTATE BLD-SCNC: 4 MMOL/L (ref 0.7–2)
LACTATE BLD-SCNC: 4.2 MMOL/L (ref 0.7–2)
LACTATE BLD-SCNC: 4.3 MMOL/L (ref 0.7–2)
LACTATE BLD-SCNC: 4.6 MMOL/L (ref 0.7–2)
LACTATE BLD-SCNC: 5.1 MMOL/L (ref 0.7–2)
LACTATE SERPL-SCNC: 3.2 MMOL/L (ref 0.4–2)
LACTATE SERPL-SCNC: 5.8 MMOL/L (ref 0.4–2)
LDH FLD L TO P-CCNC: 654 U/L
LYMPHOCYTES # BLD AUTO: 0.3 10E9/L (ref 0.8–5.3)
LYMPHOCYTES # BLD AUTO: 0.7 10E9/L (ref 0.8–5.3)
LYMPHOCYTES NFR BLD AUTO: 3.2 %
LYMPHOCYTES NFR BLD AUTO: 3.9 %
LYMPHOCYTES NFR FLD MANUAL: 1 %
LYMPHOCYTES NFR FLD MANUAL: 1 %
LYMPHOCYTES NFR FLD MANUAL: 6 %
Lab: ABNORMAL
Lab: ABNORMAL
Lab: NORMAL
Lab: NORMAL
MAGNESIUM SERPL-MCNC: 1.7 MG/DL (ref 1.6–2.3)
MAGNESIUM SERPL-MCNC: 2 MG/DL (ref 1.6–2.3)
MAGNESIUM SERPL-MCNC: 2.1 MG/DL (ref 1.6–2.3)
MCH RBC QN AUTO: 32.6 PG (ref 26.5–33)
MCH RBC QN AUTO: 33.8 PG (ref 26.5–33)
MCH RBC QN AUTO: 33.9 PG (ref 26.5–33)
MCH RBC QN AUTO: 34 PG (ref 26.5–33)
MCH RBC QN AUTO: 34.1 PG (ref 26.5–33)
MCH RBC QN AUTO: 34.2 PG (ref 26.5–33)
MCH RBC QN AUTO: 34.2 PG (ref 26.5–33)
MCH RBC QN AUTO: 34.4 PG (ref 26.5–33)
MCH RBC QN AUTO: 34.5 PG (ref 26.5–33)
MCH RBC QN AUTO: 34.5 PG (ref 26.5–33)
MCHC RBC AUTO-ENTMCNC: 32.4 G/DL (ref 31.5–36.5)
MCHC RBC AUTO-ENTMCNC: 32.9 G/DL (ref 31.5–36.5)
MCHC RBC AUTO-ENTMCNC: 33.6 G/DL (ref 31.5–36.5)
MCHC RBC AUTO-ENTMCNC: 33.9 G/DL (ref 31.5–36.5)
MCHC RBC AUTO-ENTMCNC: 33.9 G/DL (ref 31.5–36.5)
MCHC RBC AUTO-ENTMCNC: 34.2 G/DL (ref 31.5–36.5)
MCHC RBC AUTO-ENTMCNC: 34.4 G/DL (ref 31.5–36.5)
MCHC RBC AUTO-ENTMCNC: 34.5 G/DL (ref 31.5–36.5)
MCHC RBC AUTO-ENTMCNC: 34.8 G/DL (ref 31.5–36.5)
MCHC RBC AUTO-ENTMCNC: 35.1 G/DL (ref 31.5–36.5)
MCV RBC AUTO: 101 FL (ref 78–100)
MCV RBC AUTO: 102 FL (ref 78–100)
MCV RBC AUTO: 102 FL (ref 78–100)
MCV RBC AUTO: 104 FL (ref 78–100)
MCV RBC AUTO: 105 FL (ref 78–100)
MCV RBC AUTO: 96 FL (ref 78–100)
MCV RBC AUTO: 97 FL (ref 78–100)
MCV RBC AUTO: 98 FL (ref 78–100)
MCV RBC AUTO: 99 FL (ref 78–100)
MCV RBC AUTO: 99 FL (ref 78–100)
MONOCYTES # BLD AUTO: 1 10E9/L (ref 0–1.3)
MONOCYTES # BLD AUTO: 1.8 10E9/L (ref 0–1.3)
MONOCYTES NFR BLD AUTO: 10.3 %
MONOCYTES NFR BLD AUTO: 9.9 %
MONOS+MACROS NFR FLD MANUAL: 15 %
MONOS+MACROS NFR FLD MANUAL: 30 %
NEUTROPHILS # BLD AUTO: 14.9 10E9/L (ref 1.6–8.3)
NEUTROPHILS # BLD AUTO: 8.1 10E9/L (ref 1.6–8.3)
NEUTROPHILS NFR BLD AUTO: 83.6 %
NEUTROPHILS NFR BLD AUTO: 84.5 %
NEUTS BAND NFR FLD MANUAL: 64 %
NEUTS BAND NFR FLD MANUAL: 84 %
NEUTS BAND NFR FLD MANUAL: 90 %
NRBC # BLD AUTO: 0 10*3/UL
NRBC # BLD AUTO: 0 10*3/UL
NRBC BLD AUTO-RTO: 0 /100
NRBC BLD AUTO-RTO: 0 /100
NT-PROBNP SERPL-MCNC: 1408 PG/ML (ref 0–900)
NUM BPU REQUESTED: 1
NUM BPU REQUESTED: 2
OTHER CELLS FLD MANUAL: 9 %
PHOSPHATE SERPL-MCNC: 2.3 MG/DL (ref 2.5–4.5)
PHOSPHATE SERPL-MCNC: 2.6 MG/DL (ref 2.5–4.5)
PHOSPHATE SERPL-MCNC: 2.8 MG/DL (ref 2.5–4.5)
PLATELET # BLD AUTO: 116 10E9/L (ref 150–450)
PLATELET # BLD AUTO: 47 10E9/L (ref 150–450)
PLATELET # BLD AUTO: 52 10E9/L (ref 150–450)
PLATELET # BLD AUTO: 57 10E9/L (ref 150–450)
PLATELET # BLD AUTO: 57 10E9/L (ref 150–450)
PLATELET # BLD AUTO: 65 10E9/L (ref 150–450)
PLATELET # BLD AUTO: 68 10E9/L (ref 150–450)
PLATELET # BLD AUTO: 70 10E9/L (ref 150–450)
PLATELET # BLD AUTO: 80 10E9/L (ref 150–450)
PLATELET # BLD AUTO: 88 10E9/L (ref 150–450)
POTASSIUM SERPL-SCNC: 3.2 MMOL/L (ref 3.4–5.3)
POTASSIUM SERPL-SCNC: 3.6 MMOL/L (ref 3.4–5.3)
POTASSIUM SERPL-SCNC: 3.8 MMOL/L (ref 3.4–5.3)
POTASSIUM SERPL-SCNC: 3.8 MMOL/L (ref 3.4–5.3)
POTASSIUM SERPL-SCNC: 3.9 MMOL/L (ref 3.4–5.3)
POTASSIUM SERPL-SCNC: 4 MMOL/L (ref 3.4–5.3)
POTASSIUM SERPL-SCNC: 4.5 MMOL/L (ref 3.4–5.3)
POTASSIUM SERPL-SCNC: 4.7 MMOL/L (ref 3.4–5.3)
POTASSIUM SERPL-SCNC: 4.9 MMOL/L (ref 3.4–5.3)
PROCALCITONIN SERPL-MCNC: 9.57 NG/ML
PROT FLD-MCNC: 2.5 G/DL
PROT SERPL-MCNC: 5.5 G/DL (ref 6.8–8.8)
PROT SERPL-MCNC: 5.7 G/DL (ref 6.8–8.8)
PROT SERPL-MCNC: 5.9 G/DL (ref 6.8–8.8)
PROT SERPL-MCNC: 6.1 G/DL (ref 6.8–8.8)
PROT SERPL-MCNC: 6.2 G/DL (ref 6.8–8.8)
PROT SERPL-MCNC: 6.3 G/DL (ref 6.8–8.8)
PROT SERPL-MCNC: 6.4 G/DL (ref 6.8–8.8)
PROT SERPL-MCNC: 6.5 G/DL (ref 6.8–8.8)
PROT SERPL-MCNC: 7.3 G/DL (ref 6.8–8.8)
RADIOLOGIST FLAGS: ABNORMAL
RBC # BLD AUTO: 2.19 10E12/L (ref 4.4–5.9)
RBC # BLD AUTO: 2.46 10E12/L (ref 4.4–5.9)
RBC # BLD AUTO: 2.49 10E12/L (ref 4.4–5.9)
RBC # BLD AUTO: 2.77 10E12/L (ref 4.4–5.9)
RBC # BLD AUTO: 2.81 10E12/L (ref 4.4–5.9)
RBC # BLD AUTO: 2.82 10E12/L (ref 4.4–5.9)
RBC # BLD AUTO: 3.02 10E12/L (ref 4.4–5.9)
RBC # BLD AUTO: 3.04 10E12/L (ref 4.4–5.9)
RBC # BLD AUTO: 3.3 10E12/L (ref 4.4–5.9)
RBC # BLD AUTO: 4.08 10E12/L (ref 4.4–5.9)
SODIUM SERPL-SCNC: 129 MMOL/L (ref 133–144)
SODIUM SERPL-SCNC: 130 MMOL/L (ref 133–144)
SODIUM SERPL-SCNC: 131 MMOL/L (ref 133–144)
SODIUM SERPL-SCNC: 134 MMOL/L (ref 133–144)
SODIUM SERPL-SCNC: 135 MMOL/L (ref 133–144)
SODIUM SERPL-SCNC: 140 MMOL/L (ref 133–144)
SODIUM SERPL-SCNC: 141 MMOL/L (ref 133–144)
SODIUM SERPL-SCNC: 142 MMOL/L (ref 133–144)
SODIUM SERPL-SCNC: 144 MMOL/L (ref 133–144)
SODIUM SERPL-SCNC: 149 MMOL/L (ref 133–144)
SPECIMEN SOURCE FLD: NORMAL
SPECIMEN SOURCE: ABNORMAL
SPECIMEN SOURCE: ABNORMAL
SPECIMEN SOURCE: NORMAL
TRANSFUSION STATUS PATIENT QL: NORMAL
TROPONIN I SERPL-MCNC: <0.015 UG/L (ref 0–0.04)
TSH SERPL DL<=0.005 MIU/L-ACNC: 0.66 MU/L (ref 0.4–4)
WBC # BLD AUTO: 10.7 10E9/L (ref 4–11)
WBC # BLD AUTO: 10.8 10E9/L (ref 4–11)
WBC # BLD AUTO: 16.4 10E9/L (ref 4–11)
WBC # BLD AUTO: 17.8 10E9/L (ref 4–11)
WBC # BLD AUTO: 23.9 10E9/L (ref 4–11)
WBC # BLD AUTO: 26.9 10E9/L (ref 4–11)
WBC # BLD AUTO: 5.3 10E9/L (ref 4–11)
WBC # BLD AUTO: 6.1 10E9/L (ref 4–11)
WBC # BLD AUTO: 7.4 10E9/L (ref 4–11)
WBC # BLD AUTO: 9.5 10E9/L (ref 4–11)
WBC # FLD AUTO: 4137 /UL
WBC # FLD AUTO: 743 /UL
WBC # FLD AUTO: NORMAL /UL

## 2019-01-01 PROCEDURE — 25000132 ZZH RX MED GY IP 250 OP 250 PS 637: Mod: GY | Performed by: PHYSICIAN ASSISTANT

## 2019-01-01 PROCEDURE — 00000146 ZZHCL STATISTIC GLUCOSE BY METER IP

## 2019-01-01 PROCEDURE — 25000128 H RX IP 250 OP 636: Performed by: PHYSICIAN ASSISTANT

## 2019-01-01 PROCEDURE — 36415 COLL VENOUS BLD VENIPUNCTURE: CPT | Performed by: HOSPITALIST

## 2019-01-01 PROCEDURE — 99207 ZZC APP CREDIT; MD BILLING SHARED VISIT: CPT | Performed by: PHYSICIAN ASSISTANT

## 2019-01-01 PROCEDURE — 82945 GLUCOSE OTHER FLUID: CPT | Performed by: PHYSICIAN ASSISTANT

## 2019-01-01 PROCEDURE — 36415 COLL VENOUS BLD VENIPUNCTURE: CPT | Performed by: INTERNAL MEDICINE

## 2019-01-01 PROCEDURE — 40000344 ZZHCL STATISTIC THAWING COMPONENT: Performed by: PHYSICIAN ASSISTANT

## 2019-01-01 PROCEDURE — 83735 ASSAY OF MAGNESIUM: CPT | Performed by: PHYSICIAN ASSISTANT

## 2019-01-01 PROCEDURE — 83605 ASSAY OF LACTIC ACID: CPT | Performed by: INTERNAL MEDICINE

## 2019-01-01 PROCEDURE — 25800030 ZZH RX IP 258 OP 636: Performed by: PHYSICIAN ASSISTANT

## 2019-01-01 PROCEDURE — 84157 ASSAY OF PROTEIN OTHER: CPT | Performed by: PHYSICIAN ASSISTANT

## 2019-01-01 PROCEDURE — 25000128 H RX IP 250 OP 636: Performed by: INTERNAL MEDICINE

## 2019-01-01 PROCEDURE — 87075 CULTR BACTERIA EXCEPT BLOOD: CPT | Performed by: PHYSICIAN ASSISTANT

## 2019-01-01 PROCEDURE — 25000132 ZZH RX MED GY IP 250 OP 250 PS 637: Mod: GY | Performed by: INTERNAL MEDICINE

## 2019-01-01 PROCEDURE — 25000132 ZZH RX MED GY IP 250 OP 250 PS 637: Mod: GY

## 2019-01-01 PROCEDURE — 85384 FIBRINOGEN ACTIVITY: CPT | Performed by: STUDENT IN AN ORGANIZED HEALTH CARE EDUCATION/TRAINING PROGRAM

## 2019-01-01 PROCEDURE — 97110 THERAPEUTIC EXERCISES: CPT | Mod: GO

## 2019-01-01 PROCEDURE — 12000012 ZZH R&B MS OVERFLOW UMMC

## 2019-01-01 PROCEDURE — 25000128 H RX IP 250 OP 636: Performed by: HOSPITALIST

## 2019-01-01 PROCEDURE — 74177 CT ABD & PELVIS W/CONTRAST: CPT

## 2019-01-01 PROCEDURE — P9041 ALBUMIN (HUMAN),5%, 50ML: HCPCS | Performed by: PHYSICIAN ASSISTANT

## 2019-01-01 PROCEDURE — 83735 ASSAY OF MAGNESIUM: CPT | Performed by: HOSPITALIST

## 2019-01-01 PROCEDURE — 71045 X-RAY EXAM CHEST 1 VIEW: CPT

## 2019-01-01 PROCEDURE — P9047 ALBUMIN (HUMAN), 25%, 50ML: HCPCS | Performed by: PHYSICIAN ASSISTANT

## 2019-01-01 PROCEDURE — 87077 CULTURE AEROBIC IDENTIFY: CPT | Performed by: PHYSICIAN ASSISTANT

## 2019-01-01 PROCEDURE — G0463 HOSPITAL OUTPT CLINIC VISIT: HCPCS | Mod: ZF

## 2019-01-01 PROCEDURE — 88112 CYTOPATH CELL ENHANCE TECH: CPT | Performed by: PHYSICIAN ASSISTANT

## 2019-01-01 PROCEDURE — 25800030 ZZH RX IP 258 OP 636: Performed by: NURSE ANESTHETIST, CERTIFIED REGISTERED

## 2019-01-01 PROCEDURE — 87070 CULTURE OTHR SPECIMN AEROBIC: CPT | Performed by: PHYSICIAN ASSISTANT

## 2019-01-01 PROCEDURE — 99221 1ST HOSP IP/OBS SF/LOW 40: CPT | Performed by: PSYCHIATRY & NEUROLOGY

## 2019-01-01 PROCEDURE — 78226 HEPATOBILIARY SYSTEM IMAGING: CPT

## 2019-01-01 PROCEDURE — 93005 ELECTROCARDIOGRAM TRACING: CPT

## 2019-01-01 PROCEDURE — 40000141 ZZH STATISTIC PERIPHERAL IV START W/O US GUIDANCE

## 2019-01-01 PROCEDURE — 84145 PROCALCITONIN (PCT): CPT | Performed by: PHYSICIAN ASSISTANT

## 2019-01-01 PROCEDURE — 87040 BLOOD CULTURE FOR BACTERIA: CPT | Performed by: INTERNAL MEDICINE

## 2019-01-01 PROCEDURE — 80053 COMPREHEN METABOLIC PANEL: CPT | Performed by: PHYSICIAN ASSISTANT

## 2019-01-01 PROCEDURE — 87040 BLOOD CULTURE FOR BACTERIA: CPT | Performed by: NURSE PRACTITIONER

## 2019-01-01 PROCEDURE — 84132 ASSAY OF SERUM POTASSIUM: CPT | Performed by: INTERNAL MEDICINE

## 2019-01-01 PROCEDURE — 85610 PROTHROMBIN TIME: CPT | Performed by: PHYSICIAN ASSISTANT

## 2019-01-01 PROCEDURE — 40000279 XR SURGERY CARM FLUORO GREATER THAN 5 MIN W STILLS: Mod: TC

## 2019-01-01 PROCEDURE — 27210794 ZZH OR GENERAL SUPPLY STERILE: Performed by: INTERNAL MEDICINE

## 2019-01-01 PROCEDURE — 25000132 ZZH RX MED GY IP 250 OP 250 PS 637: Mod: GY | Performed by: NURSE PRACTITIONER

## 2019-01-01 PROCEDURE — 99233 SBSQ HOSP IP/OBS HIGH 50: CPT | Performed by: INTERNAL MEDICINE

## 2019-01-01 PROCEDURE — 83880 ASSAY OF NATRIURETIC PEPTIDE: CPT | Performed by: PHYSICIAN ASSISTANT

## 2019-01-01 PROCEDURE — 85027 COMPLETE CBC AUTOMATED: CPT | Performed by: INTERNAL MEDICINE

## 2019-01-01 PROCEDURE — 76705 ECHO EXAM OF ABDOMEN: CPT

## 2019-01-01 PROCEDURE — P9047 ALBUMIN (HUMAN), 25%, 50ML: HCPCS | Performed by: NURSE PRACTITIONER

## 2019-01-01 PROCEDURE — 12000004 ZZH R&B IMCU UMMC

## 2019-01-01 PROCEDURE — 85025 COMPLETE CBC W/AUTO DIFF WBC: CPT | Performed by: HOSPITALIST

## 2019-01-01 PROCEDURE — 25000125 ZZHC RX 250: Performed by: HOSPITALIST

## 2019-01-01 PROCEDURE — 25800030 ZZH RX IP 258 OP 636: Performed by: HOSPITALIST

## 2019-01-01 PROCEDURE — 86140 C-REACTIVE PROTEIN: CPT | Performed by: PHYSICIAN ASSISTANT

## 2019-01-01 PROCEDURE — 80053 COMPREHEN METABOLIC PANEL: CPT | Performed by: NURSE PRACTITIONER

## 2019-01-01 PROCEDURE — 88305 TISSUE EXAM BY PATHOLOGIST: CPT | Performed by: PHYSICIAN ASSISTANT

## 2019-01-01 PROCEDURE — 87205 SMEAR GRAM STAIN: CPT | Performed by: PHYSICIAN ASSISTANT

## 2019-01-01 PROCEDURE — 87493 C DIFF AMPLIFIED PROBE: CPT | Performed by: PHYSICIAN ASSISTANT

## 2019-01-01 PROCEDURE — 25000125 ZZHC RX 250: Performed by: NURSE ANESTHETIST, CERTIFIED REGISTERED

## 2019-01-01 PROCEDURE — 25000128 H RX IP 250 OP 636: Performed by: NURSE PRACTITIONER

## 2019-01-01 PROCEDURE — 87186 SC STD MICRODIL/AGAR DIL: CPT | Performed by: PHYSICIAN ASSISTANT

## 2019-01-01 PROCEDURE — 85025 COMPLETE CBC W/AUTO DIFF WBC: CPT | Performed by: PHYSICIAN ASSISTANT

## 2019-01-01 PROCEDURE — 99207 ZZC CDG-MDM COMPONENT: MEETS MODERATE - UP CODED: CPT | Performed by: HOSPITALIST

## 2019-01-01 PROCEDURE — 36415 COLL VENOUS BLD VENIPUNCTURE: CPT | Performed by: PHYSICIAN ASSISTANT

## 2019-01-01 PROCEDURE — P9037 PLATE PHERES LEUKOREDU IRRAD: HCPCS | Performed by: PHYSICIAN ASSISTANT

## 2019-01-01 PROCEDURE — 92610 EVALUATE SWALLOWING FUNCTION: CPT | Mod: GN

## 2019-01-01 PROCEDURE — 25800030 ZZH RX IP 258 OP 636: Performed by: INTERNAL MEDICINE

## 2019-01-01 PROCEDURE — 82042 OTHER SOURCE ALBUMIN QUAN EA: CPT | Performed by: PHYSICIAN ASSISTANT

## 2019-01-01 PROCEDURE — 25000128 H RX IP 250 OP 636: Performed by: STUDENT IN AN ORGANIZED HEALTH CARE EDUCATION/TRAINING PROGRAM

## 2019-01-01 PROCEDURE — 82105 ALPHA-FETOPROTEIN SERUM: CPT | Performed by: INTERNAL MEDICINE

## 2019-01-01 PROCEDURE — 00000155 ZZHCL STATISTIC H-CELL BLOCK W/STAIN: Performed by: PHYSICIAN ASSISTANT

## 2019-01-01 PROCEDURE — 25800030 ZZH RX IP 258 OP 636

## 2019-01-01 PROCEDURE — 82105 ALPHA-FETOPROTEIN SERUM: CPT | Performed by: PHYSICIAN ASSISTANT

## 2019-01-01 PROCEDURE — 84484 ASSAY OF TROPONIN QUANT: CPT | Performed by: PHYSICIAN ASSISTANT

## 2019-01-01 PROCEDURE — 87040 BLOOD CULTURE FOR BACTERIA: CPT | Performed by: PHYSICIAN ASSISTANT

## 2019-01-01 PROCEDURE — 0F788ZZ DILATION OF CYSTIC DUCT, VIA NATURAL OR ARTIFICIAL OPENING ENDOSCOPIC: ICD-10-PCS | Performed by: INTERNAL MEDICINE

## 2019-01-01 PROCEDURE — 87015 SPECIMEN INFECT AGNT CONCNTJ: CPT | Performed by: PHYSICIAN ASSISTANT

## 2019-01-01 PROCEDURE — A9537 TC99M MEBROFENIN: HCPCS | Performed by: INTERNAL MEDICINE

## 2019-01-01 PROCEDURE — 82247 BILIRUBIN TOTAL: CPT | Performed by: PHYSICIAN ASSISTANT

## 2019-01-01 PROCEDURE — 82248 BILIRUBIN DIRECT: CPT | Performed by: PHYSICIAN ASSISTANT

## 2019-01-01 PROCEDURE — C1769 GUIDE WIRE: HCPCS | Performed by: INTERNAL MEDICINE

## 2019-01-01 PROCEDURE — 85027 COMPLETE CBC AUTOMATED: CPT | Performed by: PHYSICIAN ASSISTANT

## 2019-01-01 PROCEDURE — C1726 CATH, BAL DIL, NON-VASCULAR: HCPCS | Performed by: INTERNAL MEDICINE

## 2019-01-01 PROCEDURE — 25500064 ZZH RX 255 OP 636: Performed by: INTERNAL MEDICINE

## 2019-01-01 PROCEDURE — 40000170 ZZH STATISTIC PRE-PROCEDURE ASSESSMENT II: Performed by: INTERNAL MEDICINE

## 2019-01-01 PROCEDURE — 82306 VITAMIN D 25 HYDROXY: CPT | Performed by: PHYSICIAN ASSISTANT

## 2019-01-01 PROCEDURE — 97530 THERAPEUTIC ACTIVITIES: CPT | Mod: GO | Performed by: OCCUPATIONAL THERAPIST

## 2019-01-01 PROCEDURE — 40000802 ZZH SITE CHECK

## 2019-01-01 PROCEDURE — 83605 ASSAY OF LACTIC ACID: CPT | Performed by: HOSPITALIST

## 2019-01-01 PROCEDURE — 99222 1ST HOSP IP/OBS MODERATE 55: CPT | Mod: GC | Performed by: INTERNAL MEDICINE

## 2019-01-01 PROCEDURE — 36415 COLL VENOUS BLD VENIPUNCTURE: CPT | Performed by: NURSE PRACTITIONER

## 2019-01-01 PROCEDURE — 86140 C-REACTIVE PROTEIN: CPT | Performed by: INTERNAL MEDICINE

## 2019-01-01 PROCEDURE — 80076 HEPATIC FUNCTION PANEL: CPT | Performed by: INTERNAL MEDICINE

## 2019-01-01 PROCEDURE — 89051 BODY FLUID CELL COUNT: CPT | Performed by: PHYSICIAN ASSISTANT

## 2019-01-01 PROCEDURE — 49083 ABD PARACENTESIS W/IMAGING: CPT | Performed by: STUDENT IN AN ORGANIZED HEALTH CARE EDUCATION/TRAINING PROGRAM

## 2019-01-01 PROCEDURE — P9059 PLASMA, FRZ BETWEEN 8-24HOUR: HCPCS | Performed by: PHYSICIAN ASSISTANT

## 2019-01-01 PROCEDURE — 99233 SBSQ HOSP IP/OBS HIGH 50: CPT | Performed by: HOSPITALIST

## 2019-01-01 PROCEDURE — 83615 LACTATE (LD) (LDH) ENZYME: CPT | Performed by: PHYSICIAN ASSISTANT

## 2019-01-01 PROCEDURE — 84443 ASSAY THYROID STIM HORMONE: CPT | Performed by: INTERNAL MEDICINE

## 2019-01-01 PROCEDURE — 99223 1ST HOSP IP/OBS HIGH 75: CPT | Mod: AI | Performed by: STUDENT IN AN ORGANIZED HEALTH CARE EDUCATION/TRAINING PROGRAM

## 2019-01-01 PROCEDURE — 85610 PROTHROMBIN TIME: CPT | Performed by: INTERNAL MEDICINE

## 2019-01-01 PROCEDURE — 97166 OT EVAL MOD COMPLEX 45 MIN: CPT | Mod: GO | Performed by: OCCUPATIONAL THERAPIST

## 2019-01-01 PROCEDURE — 36415 COLL VENOUS BLD VENIPUNCTURE: CPT | Performed by: STUDENT IN AN ORGANIZED HEALTH CARE EDUCATION/TRAINING PROGRAM

## 2019-01-01 PROCEDURE — 80053 COMPREHEN METABOLIC PANEL: CPT | Performed by: INTERNAL MEDICINE

## 2019-01-01 PROCEDURE — 84100 ASSAY OF PHOSPHORUS: CPT | Performed by: PHYSICIAN ASSISTANT

## 2019-01-01 PROCEDURE — 25000128 H RX IP 250 OP 636: Performed by: NURSE ANESTHETIST, CERTIFIED REGISTERED

## 2019-01-01 PROCEDURE — 97535 SELF CARE MNGMENT TRAINING: CPT | Mod: GO | Performed by: OCCUPATIONAL THERAPIST

## 2019-01-01 PROCEDURE — 80048 BASIC METABOLIC PNL TOTAL CA: CPT | Performed by: HOSPITALIST

## 2019-01-01 PROCEDURE — G0463 HOSPITAL OUTPT CLINIC VISIT: HCPCS | Mod: 27

## 2019-01-01 PROCEDURE — 0W9G3ZZ DRAINAGE OF PERITONEAL CAVITY, PERCUTANEOUS APPROACH: ICD-10-PCS | Performed by: STUDENT IN AN ORGANIZED HEALTH CARE EDUCATION/TRAINING PROGRAM

## 2019-01-01 PROCEDURE — 83036 HEMOGLOBIN GLYCOSYLATED A1C: CPT | Performed by: NURSE PRACTITIONER

## 2019-01-01 PROCEDURE — 0F9480Z DRAINAGE OF GALLBLADDER WITH DRAINAGE DEVICE, VIA NATURAL OR ARTIFICIAL OPENING ENDOSCOPIC: ICD-10-PCS | Performed by: INTERNAL MEDICINE

## 2019-01-01 PROCEDURE — 83605 ASSAY OF LACTIC ACID: CPT | Performed by: PHYSICIAN ASSISTANT

## 2019-01-01 PROCEDURE — P9047 ALBUMIN (HUMAN), 25%, 50ML: HCPCS | Performed by: STUDENT IN AN ORGANIZED HEALTH CARE EDUCATION/TRAINING PROGRAM

## 2019-01-01 PROCEDURE — 00000102 ZZHCL STATISTIC CYTO WRIGHT STAIN TC: Performed by: PHYSICIAN ASSISTANT

## 2019-01-01 PROCEDURE — 36000061 ZZH SURGERY LEVEL 3 W FLUORO 1ST 30 MIN - UMMC: Performed by: INTERNAL MEDICINE

## 2019-01-01 PROCEDURE — C1877 STENT, NON-COAT/COV W/O DEL: HCPCS | Performed by: INTERNAL MEDICINE

## 2019-01-01 PROCEDURE — 37000009 ZZH ANESTHESIA TECHNICAL FEE, EACH ADDTL 15 MIN: Performed by: INTERNAL MEDICINE

## 2019-01-01 PROCEDURE — 82140 ASSAY OF AMMONIA: CPT | Performed by: PHYSICIAN ASSISTANT

## 2019-01-01 PROCEDURE — 36000059 ZZH SURGERY LEVEL 3 EA 15 ADDTL MIN UMMC: Performed by: INTERNAL MEDICINE

## 2019-01-01 PROCEDURE — 93010 ELECTROCARDIOGRAM REPORT: CPT | Mod: 76 | Performed by: INTERNAL MEDICINE

## 2019-01-01 PROCEDURE — 99233 SBSQ HOSP IP/OBS HIGH 50: CPT | Mod: 25 | Performed by: INTERNAL MEDICINE

## 2019-01-01 PROCEDURE — 25000132 ZZH RX MED GY IP 250 OP 250 PS 637: Mod: GY | Performed by: HOSPITALIST

## 2019-01-01 PROCEDURE — 93306 TTE W/DOPPLER COMPLETE: CPT | Mod: 26 | Performed by: INTERNAL MEDICINE

## 2019-01-01 PROCEDURE — 71000014 ZZH RECOVERY PHASE 1 LEVEL 2 FIRST HR: Performed by: INTERNAL MEDICINE

## 2019-01-01 PROCEDURE — 99207 ZZC APP CREDIT; MD BILLING SHARED VISIT: CPT | Performed by: NURSE PRACTITIONER

## 2019-01-01 PROCEDURE — 80053 COMPREHEN METABOLIC PANEL: CPT | Performed by: HOSPITALIST

## 2019-01-01 PROCEDURE — 80048 BASIC METABOLIC PNL TOTAL CA: CPT | Performed by: INTERNAL MEDICINE

## 2019-01-01 PROCEDURE — P9047 ALBUMIN (HUMAN), 25%, 50ML: HCPCS | Performed by: INTERNAL MEDICINE

## 2019-01-01 PROCEDURE — 84100 ASSAY OF PHOSPHORUS: CPT | Performed by: HOSPITALIST

## 2019-01-01 PROCEDURE — 85027 COMPLETE CBC AUTOMATED: CPT | Performed by: NURSE PRACTITIONER

## 2019-01-01 PROCEDURE — 34300033 ZZH RX 343: Performed by: INTERNAL MEDICINE

## 2019-01-01 PROCEDURE — 85610 PROTHROMBIN TIME: CPT | Performed by: NURSE PRACTITIONER

## 2019-01-01 PROCEDURE — 25000566 ZZH SEVOFLURANE, EA 15 MIN: Performed by: INTERNAL MEDICINE

## 2019-01-01 PROCEDURE — 92526 ORAL FUNCTION THERAPY: CPT | Mod: GN

## 2019-01-01 PROCEDURE — 37000008 ZZH ANESTHESIA TECHNICAL FEE, 1ST 30 MIN: Performed by: INTERNAL MEDICINE

## 2019-01-01 DEVICE — STENT BILIARY COMPASS BDS 7FRX15CM DBL PIGTAIL G55521: Type: IMPLANTABLE DEVICE | Site: BILE DUCT | Status: FUNCTIONAL

## 2019-01-01 RX ORDER — NALOXONE HYDROCHLORIDE 0.4 MG/ML
.1-.4 INJECTION, SOLUTION INTRAMUSCULAR; INTRAVENOUS; SUBCUTANEOUS
Status: DISCONTINUED | OUTPATIENT
Start: 2019-01-01 | End: 2020-01-01 | Stop reason: HOSPADM

## 2019-01-01 RX ORDER — ERTAPENEM 1 G/1
1 INJECTION, POWDER, LYOPHILIZED, FOR SOLUTION INTRAMUSCULAR; INTRAVENOUS EVERY 24 HOURS
Status: DISCONTINUED | OUTPATIENT
Start: 2019-01-01 | End: 2019-01-01

## 2019-01-01 RX ORDER — INSULIN GLARGINE 100 [IU]/ML
15 INJECTION, SOLUTION SUBCUTANEOUS AT BEDTIME
Status: ON HOLD | COMMUNITY
End: 2020-01-01

## 2019-01-01 RX ORDER — HYDROMORPHONE HYDROCHLORIDE 1 MG/ML
.3-.5 INJECTION, SOLUTION INTRAMUSCULAR; INTRAVENOUS; SUBCUTANEOUS EVERY 4 HOURS PRN
Status: DISCONTINUED | OUTPATIENT
Start: 2019-01-01 | End: 2019-01-01

## 2019-01-01 RX ORDER — PROPRANOLOL HYDROCHLORIDE 10 MG/1
10 TABLET ORAL
Status: DISCONTINUED | OUTPATIENT
Start: 2019-01-01 | End: 2020-01-01 | Stop reason: HOSPADM

## 2019-01-01 RX ORDER — SODIUM CHLORIDE, SODIUM LACTATE, POTASSIUM CHLORIDE, CALCIUM CHLORIDE 600; 310; 30; 20 MG/100ML; MG/100ML; MG/100ML; MG/100ML
INJECTION, SOLUTION INTRAVENOUS CONTINUOUS
Status: DISCONTINUED | OUTPATIENT
Start: 2019-01-01 | End: 2019-01-01 | Stop reason: HOSPADM

## 2019-01-01 RX ORDER — LIDOCAINE HYDROCHLORIDE 20 MG/ML
INJECTION, SOLUTION INFILTRATION; PERINEURAL PRN
Status: DISCONTINUED | OUTPATIENT
Start: 2019-01-01 | End: 2019-01-01

## 2019-01-01 RX ORDER — LACTULOSE 10 G/15ML
20 SOLUTION ORAL
Status: DISCONTINUED | OUTPATIENT
Start: 2019-01-01 | End: 2020-01-01 | Stop reason: HOSPADM

## 2019-01-01 RX ORDER — POTASSIUM CHLORIDE 7.45 MG/ML
10 INJECTION INTRAVENOUS
Status: DISCONTINUED | OUTPATIENT
Start: 2019-01-01 | End: 2020-01-01 | Stop reason: HOSPADM

## 2019-01-01 RX ORDER — TRAZODONE HYDROCHLORIDE 50 MG/1
50 TABLET, FILM COATED ORAL AT BEDTIME
Status: DISCONTINUED | OUTPATIENT
Start: 2019-01-01 | End: 2020-01-01

## 2019-01-01 RX ORDER — HALOPERIDOL 5 MG/ML
2 INJECTION INTRAMUSCULAR EVERY 6 HOURS PRN
Status: DISCONTINUED | OUTPATIENT
Start: 2019-01-01 | End: 2019-01-01

## 2019-01-01 RX ORDER — HALOPERIDOL 2 MG/1
2 TABLET ORAL ONCE
Status: COMPLETED | OUTPATIENT
Start: 2019-01-01 | End: 2019-01-01

## 2019-01-01 RX ORDER — ADENOSINE 3 MG/ML
6 INJECTION, SOLUTION INTRAVENOUS ONCE
Status: COMPLETED | OUTPATIENT
Start: 2019-01-01 | End: 2019-01-01

## 2019-01-01 RX ORDER — SODIUM CHLORIDE 9 MG/ML
INJECTION, SOLUTION INTRAVENOUS
Status: COMPLETED
Start: 2019-01-01 | End: 2019-01-01

## 2019-01-01 RX ORDER — IOPAMIDOL 755 MG/ML
116 INJECTION, SOLUTION INTRAVASCULAR ONCE
Status: COMPLETED | OUTPATIENT
Start: 2019-01-01 | End: 2019-01-01

## 2019-01-01 RX ORDER — ONDANSETRON 4 MG/1
4 TABLET, ORALLY DISINTEGRATING ORAL EVERY 30 MIN PRN
Status: DISCONTINUED | OUTPATIENT
Start: 2019-01-01 | End: 2019-01-01 | Stop reason: HOSPADM

## 2019-01-01 RX ORDER — AMOXICILLIN 250 MG
2 CAPSULE ORAL 2 TIMES DAILY PRN
Status: DISCONTINUED | OUTPATIENT
Start: 2019-01-01 | End: 2020-01-01 | Stop reason: HOSPADM

## 2019-01-01 RX ORDER — LANOLIN ALCOHOL/MO/W.PET/CERES
6 CREAM (GRAM) TOPICAL
Status: DISCONTINUED | OUTPATIENT
Start: 2019-01-01 | End: 2020-01-01

## 2019-01-01 RX ORDER — LIDOCAINE 40 MG/G
CREAM TOPICAL
Status: DISCONTINUED | OUTPATIENT
Start: 2019-01-01 | End: 2020-01-01 | Stop reason: HOSPADM

## 2019-01-01 RX ORDER — QUETIAPINE FUMARATE 25 MG/1
25-50 TABLET, FILM COATED ORAL EVERY 6 HOURS PRN
Status: DISCONTINUED | OUTPATIENT
Start: 2019-01-01 | End: 2019-01-01

## 2019-01-01 RX ORDER — ONDANSETRON 2 MG/ML
4 INJECTION INTRAMUSCULAR; INTRAVENOUS EVERY 30 MIN PRN
Status: DISCONTINUED | OUTPATIENT
Start: 2019-01-01 | End: 2019-01-01 | Stop reason: HOSPADM

## 2019-01-01 RX ORDER — OLANZAPINE 5 MG/1
5 TABLET, ORALLY DISINTEGRATING ORAL AT BEDTIME
Status: DISCONTINUED | OUTPATIENT
Start: 2019-01-01 | End: 2020-01-01 | Stop reason: HOSPADM

## 2019-01-01 RX ORDER — CALCIUM CARBONATE 500 MG/1
500-1000 TABLET, CHEWABLE ORAL 3 TIMES DAILY PRN
Status: DISCONTINUED | OUTPATIENT
Start: 2019-01-01 | End: 2020-01-01 | Stop reason: HOSPADM

## 2019-01-01 RX ORDER — NALOXONE HYDROCHLORIDE 0.4 MG/ML
.1-.4 INJECTION, SOLUTION INTRAMUSCULAR; INTRAVENOUS; SUBCUTANEOUS
Status: ACTIVE | OUTPATIENT
Start: 2019-01-01 | End: 2019-01-01

## 2019-01-01 RX ORDER — FLUMAZENIL 0.1 MG/ML
0.2 INJECTION, SOLUTION INTRAVENOUS
Status: ACTIVE | OUTPATIENT
Start: 2019-01-01 | End: 2019-01-01

## 2019-01-01 RX ORDER — NICOTINE POLACRILEX 4 MG
15-30 LOZENGE BUCCAL
Status: DISCONTINUED | OUTPATIENT
Start: 2019-01-01 | End: 2020-01-01 | Stop reason: HOSPADM

## 2019-01-01 RX ORDER — VANCOMYCIN HYDROCHLORIDE 50 MG/ML
250 KIT ORAL 4 TIMES DAILY
Status: DISCONTINUED | OUTPATIENT
Start: 2019-01-01 | End: 2019-01-01

## 2019-01-01 RX ORDER — LEVOTHYROXINE SODIUM 50 UG/1
50 TABLET ORAL DAILY
Status: DISCONTINUED | OUTPATIENT
Start: 2019-01-01 | End: 2020-01-01 | Stop reason: HOSPADM

## 2019-01-01 RX ORDER — SODIUM CHLORIDE, SODIUM LACTATE, POTASSIUM CHLORIDE, CALCIUM CHLORIDE 600; 310; 30; 20 MG/100ML; MG/100ML; MG/100ML; MG/100ML
INJECTION, SOLUTION INTRAVENOUS ONCE
Status: COMPLETED | OUTPATIENT
Start: 2019-01-01 | End: 2019-01-01

## 2019-01-01 RX ORDER — ALBUMIN, HUMAN INJ 5% 5 %
25 SOLUTION INTRAVENOUS ONCE
Status: COMPLETED | OUTPATIENT
Start: 2019-01-01 | End: 2019-01-01

## 2019-01-01 RX ORDER — HYDROMORPHONE HYDROCHLORIDE 1 MG/ML
.3-.5 INJECTION, SOLUTION INTRAMUSCULAR; INTRAVENOUS; SUBCUTANEOUS EVERY 5 MIN PRN
Status: DISCONTINUED | OUTPATIENT
Start: 2019-01-01 | End: 2019-01-01 | Stop reason: HOSPADM

## 2019-01-01 RX ORDER — LACTULOSE 10 G/15ML
20 SOLUTION ORAL 3 TIMES DAILY
Status: DISCONTINUED | OUTPATIENT
Start: 2019-01-01 | End: 2020-01-01

## 2019-01-01 RX ORDER — POTASSIUM CHLORIDE 29.8 MG/ML
20 INJECTION INTRAVENOUS
Status: DISCONTINUED | OUTPATIENT
Start: 2019-01-01 | End: 2020-01-01 | Stop reason: HOSPADM

## 2019-01-01 RX ORDER — DEXAMETHASONE SODIUM PHOSPHATE 4 MG/ML
INJECTION, SOLUTION INTRA-ARTICULAR; INTRALESIONAL; INTRAMUSCULAR; INTRAVENOUS; SOFT TISSUE PRN
Status: DISCONTINUED | OUTPATIENT
Start: 2019-01-01 | End: 2019-01-01

## 2019-01-01 RX ORDER — DEXTROSE MONOHYDRATE 25 G/50ML
25-50 INJECTION, SOLUTION INTRAVENOUS
Status: DISCONTINUED | OUTPATIENT
Start: 2019-01-01 | End: 2020-01-01 | Stop reason: HOSPADM

## 2019-01-01 RX ORDER — BENZONATATE 100 MG/1
100 CAPSULE ORAL 3 TIMES DAILY PRN
Status: DISCONTINUED | OUTPATIENT
Start: 2019-01-01 | End: 2020-01-01 | Stop reason: HOSPADM

## 2019-01-01 RX ORDER — ALBUMIN (HUMAN) 12.5 G/50ML
25 SOLUTION INTRAVENOUS ONCE
Status: COMPLETED | OUTPATIENT
Start: 2019-01-01 | End: 2019-01-01

## 2019-01-01 RX ORDER — SODIUM CHLORIDE 9 MG/ML
INJECTION, SOLUTION INTRAVENOUS CONTINUOUS
Status: DISCONTINUED | OUTPATIENT
Start: 2019-01-01 | End: 2019-01-01

## 2019-01-01 RX ORDER — ALBUMIN (HUMAN) 12.5 G/50ML
100 SOLUTION INTRAVENOUS ONCE
Status: COMPLETED | OUTPATIENT
Start: 2019-01-01 | End: 2019-01-01

## 2019-01-01 RX ORDER — SODIUM CHLORIDE, SODIUM LACTATE, POTASSIUM CHLORIDE, CALCIUM CHLORIDE 600; 310; 30; 20 MG/100ML; MG/100ML; MG/100ML; MG/100ML
INJECTION, SOLUTION INTRAVENOUS CONTINUOUS
Status: DISCONTINUED | OUTPATIENT
Start: 2019-01-01 | End: 2019-01-01

## 2019-01-01 RX ORDER — POTASSIUM CHLORIDE 750 MG/1
20-40 TABLET, EXTENDED RELEASE ORAL
Status: DISCONTINUED | OUTPATIENT
Start: 2019-01-01 | End: 2020-01-01 | Stop reason: HOSPADM

## 2019-01-01 RX ORDER — FUROSEMIDE 20 MG
20 TABLET ORAL
Status: DISCONTINUED | OUTPATIENT
Start: 2019-01-01 | End: 2019-01-01

## 2019-01-01 RX ORDER — CIPROFLOXACIN 2 MG/ML
400 INJECTION, SOLUTION INTRAVENOUS EVERY 12 HOURS
Status: DISCONTINUED | OUTPATIENT
Start: 2019-01-01 | End: 2019-01-01

## 2019-01-01 RX ORDER — KIT FOR THE PREPARATION OF TECHNETIUM TC 99M MEBROFENIN 45 MG/10ML
4.8-7.2 INJECTION, POWDER, LYOPHILIZED, FOR SOLUTION INTRAVENOUS ONCE
Status: COMPLETED | OUTPATIENT
Start: 2019-01-01 | End: 2019-01-01

## 2019-01-01 RX ORDER — OXYCODONE HYDROCHLORIDE 5 MG/1
5-10 TABLET ORAL
Status: DISCONTINUED | OUTPATIENT
Start: 2019-01-01 | End: 2019-01-01

## 2019-01-01 RX ORDER — ONDANSETRON 2 MG/ML
INJECTION INTRAMUSCULAR; INTRAVENOUS PRN
Status: DISCONTINUED | OUTPATIENT
Start: 2019-01-01 | End: 2019-01-01

## 2019-01-01 RX ORDER — CYCLOBENZAPRINE HCL 10 MG
10 TABLET ORAL 3 TIMES DAILY PRN
Status: DISCONTINUED | OUTPATIENT
Start: 2019-01-01 | End: 2020-01-01 | Stop reason: HOSPADM

## 2019-01-01 RX ORDER — FUROSEMIDE 20 MG
20 TABLET ORAL EVERY 24 HOURS
Status: DISCONTINUED | OUTPATIENT
Start: 2019-01-01 | End: 2020-01-01 | Stop reason: HOSPADM

## 2019-01-01 RX ORDER — BENZTROPINE MESYLATE 1 MG/1
1-2 TABLET ORAL 3 TIMES DAILY PRN
Status: DISCONTINUED | OUTPATIENT
Start: 2019-01-01 | End: 2020-01-01 | Stop reason: HOSPADM

## 2019-01-01 RX ORDER — FERROUS SULFATE 325(65) MG
325 TABLET ORAL 2 TIMES DAILY
Status: DISCONTINUED | OUTPATIENT
Start: 2019-01-01 | End: 2020-01-01 | Stop reason: HOSPADM

## 2019-01-01 RX ORDER — OLANZAPINE 5 MG/1
5 TABLET ORAL AT BEDTIME
Status: DISCONTINUED | OUTPATIENT
Start: 2019-01-01 | End: 2019-01-01

## 2019-01-01 RX ORDER — DEXTROSE MONOHYDRATE 50 MG/ML
INJECTION, SOLUTION INTRAVENOUS CONTINUOUS
Status: ACTIVE | OUTPATIENT
Start: 2019-01-01 | End: 2019-01-01

## 2019-01-01 RX ORDER — AMOXICILLIN 250 MG
1 CAPSULE ORAL 2 TIMES DAILY PRN
Status: DISCONTINUED | OUTPATIENT
Start: 2019-01-01 | End: 2020-01-01 | Stop reason: HOSPADM

## 2019-01-01 RX ORDER — SENNOSIDES 8.6 MG
8.6 TABLET ORAL 2 TIMES DAILY
Status: DISCONTINUED | OUTPATIENT
Start: 2019-01-01 | End: 2020-01-01 | Stop reason: HOSPADM

## 2019-01-01 RX ORDER — PROPOFOL 10 MG/ML
INJECTION, EMULSION INTRAVENOUS PRN
Status: DISCONTINUED | OUTPATIENT
Start: 2019-01-01 | End: 2019-01-01

## 2019-01-01 RX ORDER — ALBUMIN (HUMAN) 12.5 G/50ML
50 SOLUTION INTRAVENOUS ONCE
Status: COMPLETED | OUTPATIENT
Start: 2019-01-01 | End: 2019-01-01

## 2019-01-01 RX ORDER — IOPAMIDOL 510 MG/ML
INJECTION, SOLUTION INTRAVASCULAR PRN
Status: DISCONTINUED | OUTPATIENT
Start: 2019-01-01 | End: 2019-01-01 | Stop reason: HOSPADM

## 2019-01-01 RX ORDER — POLYETHYLENE GLYCOL 3350 17 G/17G
17 POWDER, FOR SOLUTION ORAL DAILY PRN
Status: DISCONTINUED | OUTPATIENT
Start: 2019-01-01 | End: 2020-01-01 | Stop reason: HOSPADM

## 2019-01-01 RX ORDER — SODIUM CHLORIDE, SODIUM LACTATE, POTASSIUM CHLORIDE, CALCIUM CHLORIDE 600; 310; 30; 20 MG/100ML; MG/100ML; MG/100ML; MG/100ML
INJECTION, SOLUTION INTRAVENOUS CONTINUOUS PRN
Status: DISCONTINUED | OUTPATIENT
Start: 2019-01-01 | End: 2019-01-01

## 2019-01-01 RX ORDER — SODIUM CHLORIDE 9 MG/ML
INJECTION, SOLUTION INTRAVENOUS CONTINUOUS
Status: DISCONTINUED | OUTPATIENT
Start: 2019-01-01 | End: 2020-01-01 | Stop reason: HOSPADM

## 2019-01-01 RX ORDER — OXYCODONE HYDROCHLORIDE 5 MG/1
5 TABLET ORAL EVERY 4 HOURS PRN
Status: DISCONTINUED | OUTPATIENT
Start: 2019-01-01 | End: 2020-01-01 | Stop reason: HOSPADM

## 2019-01-01 RX ORDER — LACTULOSE 10 G/15ML
100 SOLUTION ORAL
Status: DISCONTINUED | OUTPATIENT
Start: 2019-01-01 | End: 2020-01-01 | Stop reason: HOSPADM

## 2019-01-01 RX ORDER — LIDOCAINE 4 G/G
1-2 PATCH TOPICAL
Status: DISCONTINUED | OUTPATIENT
Start: 2019-01-01 | End: 2020-01-01 | Stop reason: HOSPADM

## 2019-01-01 RX ORDER — OLANZAPINE 5 MG/1
5 TABLET, ORALLY DISINTEGRATING ORAL EVERY 6 HOURS PRN
Status: DISCONTINUED | OUTPATIENT
Start: 2019-01-01 | End: 2020-01-01 | Stop reason: HOSPADM

## 2019-01-01 RX ORDER — LIDOCAINE 40 MG/G
CREAM TOPICAL
Status: DISCONTINUED | OUTPATIENT
Start: 2019-01-01 | End: 2019-01-01 | Stop reason: HOSPADM

## 2019-01-01 RX ORDER — FENTANYL CITRATE 50 UG/ML
25-50 INJECTION, SOLUTION INTRAMUSCULAR; INTRAVENOUS
Status: DISCONTINUED | OUTPATIENT
Start: 2019-01-01 | End: 2019-01-01 | Stop reason: HOSPADM

## 2019-01-01 RX ORDER — OMEPRAZOLE 20 MG/1
20 TABLET, DELAYED RELEASE ORAL 2 TIMES DAILY
Status: DISCONTINUED | OUTPATIENT
Start: 2019-01-01 | End: 2019-01-01

## 2019-01-01 RX ORDER — PANTOPRAZOLE SODIUM 40 MG/1
40 TABLET, DELAYED RELEASE ORAL
Status: DISCONTINUED | OUTPATIENT
Start: 2019-01-01 | End: 2020-01-01 | Stop reason: HOSPADM

## 2019-01-01 RX ORDER — MORPHINE SULFATE 2 MG/ML
2 INJECTION, SOLUTION INTRAMUSCULAR; INTRAVENOUS ONCE
Status: COMPLETED | OUTPATIENT
Start: 2019-01-01 | End: 2019-01-01

## 2019-01-01 RX ORDER — PROPRANOLOL HYDROCHLORIDE 10 MG/1
10 TABLET ORAL 3 TIMES DAILY
Status: DISCONTINUED | OUTPATIENT
Start: 2019-01-01 | End: 2019-01-01

## 2019-01-01 RX ORDER — MODAFINIL 100 MG/1
100 TABLET ORAL DAILY
Status: DISCONTINUED | OUTPATIENT
Start: 2019-01-01 | End: 2019-01-01

## 2019-01-01 RX ORDER — POTASSIUM CHLORIDE 1.5 G/1.58G
20-40 POWDER, FOR SOLUTION ORAL
Status: DISCONTINUED | OUTPATIENT
Start: 2019-01-01 | End: 2020-01-01 | Stop reason: HOSPADM

## 2019-01-01 RX ORDER — FUROSEMIDE 40 MG
40 TABLET ORAL DAILY
Status: DISCONTINUED | OUTPATIENT
Start: 2019-01-01 | End: 2020-01-01 | Stop reason: HOSPADM

## 2019-01-01 RX ORDER — POTASSIUM CL/LIDO/0.9 % NACL 10MEQ/0.1L
10 INTRAVENOUS SOLUTION, PIGGYBACK (ML) INTRAVENOUS
Status: DISCONTINUED | OUTPATIENT
Start: 2019-01-01 | End: 2020-01-01 | Stop reason: HOSPADM

## 2019-01-01 RX ORDER — CEFTRIAXONE 2 G/1
2 INJECTION, POWDER, FOR SOLUTION INTRAMUSCULAR; INTRAVENOUS EVERY 24 HOURS
Status: DISCONTINUED | OUTPATIENT
Start: 2019-01-01 | End: 2020-01-01

## 2019-01-01 RX ADMIN — LACTULOSE 20 G: 20 SOLUTION ORAL at 03:16

## 2019-01-01 RX ADMIN — PROPOFOL 100 MG: 10 INJECTION, EMULSION INTRAVENOUS at 09:17

## 2019-01-01 RX ADMIN — PANTOPRAZOLE SODIUM 40 MG: 40 TABLET, DELAYED RELEASE ORAL at 16:44

## 2019-01-01 RX ADMIN — Medication 2 TABLET: at 10:06

## 2019-01-01 RX ADMIN — THIAMINE HYDROCHLORIDE 500 MG: 100 INJECTION, SOLUTION INTRAMUSCULAR; INTRAVENOUS at 05:09

## 2019-01-01 RX ADMIN — Medication 2 TABLET: at 19:35

## 2019-01-01 RX ADMIN — HUMAN INSULIN 4 UNITS/HR: 100 INJECTION, SOLUTION SUBCUTANEOUS at 19:16

## 2019-01-01 RX ADMIN — Medication 2 TABLET: at 11:37

## 2019-01-01 RX ADMIN — OXYCODONE HYDROCHLORIDE 5 MG: 5 TABLET ORAL at 15:21

## 2019-01-01 RX ADMIN — POTASSIUM CHLORIDE 40 MEQ: 1.5 POWDER, FOR SOLUTION ORAL at 18:02

## 2019-01-01 RX ADMIN — HUMAN INSULIN 4 UNITS/HR: 100 INJECTION, SOLUTION SUBCUTANEOUS at 12:11

## 2019-01-01 RX ADMIN — HUMAN INSULIN 5 UNITS/HR: 100 INJECTION, SOLUTION SUBCUTANEOUS at 01:51

## 2019-01-01 RX ADMIN — Medication 2 TABLET: at 09:10

## 2019-01-01 RX ADMIN — HUMAN INSULIN 4 UNITS/HR: 100 INJECTION, SOLUTION SUBCUTANEOUS at 06:07

## 2019-01-01 RX ADMIN — PANTOPRAZOLE SODIUM 40 MG: 40 TABLET, DELAYED RELEASE ORAL at 16:19

## 2019-01-01 RX ADMIN — OLANZAPINE 5 MG: 5 TABLET, ORALLY DISINTEGRATING ORAL at 22:35

## 2019-01-01 RX ADMIN — MELATONIN TAB 3 MG 6 MG: 3 TAB at 21:42

## 2019-01-01 RX ADMIN — FERROUS SULFATE TAB 325 MG (65 MG ELEMENTAL FE) 325 MG: 325 (65 FE) TAB at 08:03

## 2019-01-01 RX ADMIN — Medication 2 TABLET: at 08:04

## 2019-01-01 RX ADMIN — PANTOPRAZOLE SODIUM 40 MG: 40 TABLET, DELAYED RELEASE ORAL at 08:53

## 2019-01-01 RX ADMIN — OXYCODONE HYDROCHLORIDE 10 MG: 5 TABLET ORAL at 22:31

## 2019-01-01 RX ADMIN — LACTULOSE 20 G: 20 SOLUTION ORAL at 13:11

## 2019-01-01 RX ADMIN — RIFAXIMIN 550 MG: 550 TABLET ORAL at 19:51

## 2019-01-01 RX ADMIN — LACTULOSE 20 G: 20 SOLUTION ORAL at 20:37

## 2019-01-01 RX ADMIN — PANTOPRAZOLE SODIUM 40 MG: 40 TABLET, DELAYED RELEASE ORAL at 08:09

## 2019-01-01 RX ADMIN — THIAMINE HYDROCHLORIDE 500 MG: 100 INJECTION, SOLUTION INTRAMUSCULAR; INTRAVENOUS at 21:59

## 2019-01-01 RX ADMIN — Medication 2 TABLET: at 08:54

## 2019-01-01 RX ADMIN — LACTULOSE 20 G: 20 SOLUTION ORAL at 05:06

## 2019-01-01 RX ADMIN — Medication 2 TABLET: at 19:51

## 2019-01-01 RX ADMIN — CEFTRIAXONE 2 G: 2 INJECTION, POWDER, FOR SOLUTION INTRAMUSCULAR; INTRAVENOUS at 16:19

## 2019-01-01 RX ADMIN — LACTULOSE 20 G: 20 SOLUTION ORAL at 18:02

## 2019-01-01 RX ADMIN — RIFAXIMIN 550 MG: 550 TABLET ORAL at 09:00

## 2019-01-01 RX ADMIN — ADENOSINE 6 MG: 3 INJECTION, SOLUTION INTRAVENOUS at 22:19

## 2019-01-01 RX ADMIN — VANCOMYCIN HYDROCHLORIDE 1750 MG: 1 INJECTION, POWDER, LYOPHILIZED, FOR SOLUTION INTRAVENOUS at 13:38

## 2019-01-01 RX ADMIN — FERROUS SULFATE TAB 325 MG (65 MG ELEMENTAL FE) 325 MG: 325 (65 FE) TAB at 20:05

## 2019-01-01 RX ADMIN — LACTULOSE 20 G: 20 SOLUTION ORAL at 13:38

## 2019-01-01 RX ADMIN — RIFAXIMIN 550 MG: 550 TABLET ORAL at 20:05

## 2019-01-01 RX ADMIN — HALOPERIDOL LACTATE 2 MG: 5 INJECTION, SOLUTION INTRAMUSCULAR at 13:43

## 2019-01-01 RX ADMIN — MODAFINIL 100 MG: 100 TABLET ORAL at 08:03

## 2019-01-01 RX ADMIN — MAGNESIUM 64 MG (MAGNESIUM CHLORIDE) TABLET,DELAYED RELEASE 535 MG: at 08:03

## 2019-01-01 RX ADMIN — VANCOMYCIN HYDROCHLORIDE 250 MG: KIT at 21:34

## 2019-01-01 RX ADMIN — MODAFINIL 100 MG: 100 TABLET ORAL at 08:09

## 2019-01-01 RX ADMIN — PROPRANOLOL HYDROCHLORIDE 10 MG: 10 TABLET ORAL at 13:35

## 2019-01-01 RX ADMIN — ALBUMIN HUMAN 25 G: 0.05 INJECTION, SOLUTION INTRAVENOUS at 16:59

## 2019-01-01 RX ADMIN — DEXTROSE MONOHYDRATE: 50 INJECTION, SOLUTION INTRAVENOUS at 12:04

## 2019-01-01 RX ADMIN — LACTULOSE 20 G: 20 SOLUTION ORAL at 13:51

## 2019-01-01 RX ADMIN — CYCLOBENZAPRINE HYDROCHLORIDE 10 MG: 10 TABLET, FILM COATED ORAL at 20:04

## 2019-01-01 RX ADMIN — SODIUM CHLORIDE, POTASSIUM CHLORIDE, SODIUM LACTATE AND CALCIUM CHLORIDE: 600; 310; 30; 20 INJECTION, SOLUTION INTRAVENOUS at 20:23

## 2019-01-01 RX ADMIN — RIFAXIMIN 550 MG: 550 TABLET ORAL at 20:20

## 2019-01-01 RX ADMIN — THIAMINE HYDROCHLORIDE 500 MG: 100 INJECTION, SOLUTION INTRAMUSCULAR; INTRAVENOUS at 23:01

## 2019-01-01 RX ADMIN — FERROUS SULFATE TAB 325 MG (65 MG ELEMENTAL FE) 325 MG: 325 (65 FE) TAB at 11:50

## 2019-01-01 RX ADMIN — SODIUM CHLORIDE: 9 INJECTION, SOLUTION INTRAVENOUS at 05:15

## 2019-01-01 RX ADMIN — HUMAN INSULIN 12 UNITS/HR: 100 INJECTION, SOLUTION SUBCUTANEOUS at 15:01

## 2019-01-01 RX ADMIN — LACTULOSE 20 G: 20 SOLUTION ORAL at 11:49

## 2019-01-01 RX ADMIN — PANTOPRAZOLE SODIUM 40 MG: 40 TABLET, DELAYED RELEASE ORAL at 09:11

## 2019-01-01 RX ADMIN — MICAFUNGIN SODIUM 100 MG: 10 INJECTION, POWDER, LYOPHILIZED, FOR SOLUTION INTRAVENOUS at 14:07

## 2019-01-01 RX ADMIN — OXYCODONE HYDROCHLORIDE 10 MG: 5 TABLET ORAL at 17:06

## 2019-01-01 RX ADMIN — PROPRANOLOL HYDROCHLORIDE 10 MG: 10 TABLET ORAL at 21:13

## 2019-01-01 RX ADMIN — TRAZODONE HYDROCHLORIDE 50 MG: 50 TABLET ORAL at 21:42

## 2019-01-01 RX ADMIN — RIFAXIMIN 550 MG: 550 TABLET ORAL at 09:12

## 2019-01-01 RX ADMIN — FERROUS SULFATE TAB 325 MG (65 MG ELEMENTAL FE) 325 MG: 325 (65 FE) TAB at 19:33

## 2019-01-01 RX ADMIN — ALBUMIN HUMAN 25 G: 0.25 SOLUTION INTRAVENOUS at 05:32

## 2019-01-01 RX ADMIN — LACTULOSE 20 G: 20 SOLUTION ORAL at 19:33

## 2019-01-01 RX ADMIN — LIDOCAINE 1 PATCH: 560 PATCH PERCUTANEOUS; TOPICAL; TRANSDERMAL at 20:04

## 2019-01-01 RX ADMIN — Medication 2 TABLET: at 20:20

## 2019-01-01 RX ADMIN — FERROUS SULFATE TAB 325 MG (65 MG ELEMENTAL FE) 325 MG: 325 (65 FE) TAB at 07:45

## 2019-01-01 RX ADMIN — CIPROFLOXACIN 400 MG: 2 INJECTION, SOLUTION INTRAVENOUS at 19:34

## 2019-01-01 RX ADMIN — RIFAXIMIN 550 MG: 550 TABLET ORAL at 20:11

## 2019-01-01 RX ADMIN — HUMAN INSULIN 8 UNITS/HR: 100 INJECTION, SOLUTION SUBCUTANEOUS at 23:00

## 2019-01-01 RX ADMIN — SUGAMMADEX 200 MG: 100 INJECTION, SOLUTION INTRAVENOUS at 10:33

## 2019-01-01 RX ADMIN — ERTAPENEM SODIUM 1 G: 1 INJECTION, POWDER, LYOPHILIZED, FOR SOLUTION INTRAMUSCULAR; INTRAVENOUS at 21:18

## 2019-01-01 RX ADMIN — LACTULOSE 20 G: 20 SOLUTION ORAL at 20:20

## 2019-01-01 RX ADMIN — MEBROFENIN 6 MILLICURIE: 45 INJECTION, POWDER, LYOPHILIZED, FOR SOLUTION INTRAVENOUS at 14:33

## 2019-01-01 RX ADMIN — PHENYLEPHRINE HYDROCHLORIDE 100 MCG: 10 INJECTION INTRAVENOUS at 09:17

## 2019-01-01 RX ADMIN — MORPHINE SULFATE 2 MG: 2 INJECTION, SOLUTION INTRAMUSCULAR; INTRAVENOUS at 15:41

## 2019-01-01 RX ADMIN — CYCLOBENZAPRINE HYDROCHLORIDE 10 MG: 10 TABLET, FILM COATED ORAL at 13:35

## 2019-01-01 RX ADMIN — FERROUS SULFATE TAB 325 MG (65 MG ELEMENTAL FE) 325 MG: 325 (65 FE) TAB at 21:13

## 2019-01-01 RX ADMIN — THIAMINE HYDROCHLORIDE 500 MG: 100 INJECTION, SOLUTION INTRAMUSCULAR; INTRAVENOUS at 05:40

## 2019-01-01 RX ADMIN — Medication 2 TABLET: at 09:59

## 2019-01-01 RX ADMIN — PHYTONADIONE 10 MG: 10 INJECTION, EMULSION INTRAMUSCULAR; INTRAVENOUS; SUBCUTANEOUS at 07:56

## 2019-01-01 RX ADMIN — HYDROMORPHONE HYDROCHLORIDE 0.5 MG: 1 INJECTION, SOLUTION INTRAMUSCULAR; INTRAVENOUS; SUBCUTANEOUS at 19:47

## 2019-01-01 RX ADMIN — HUMAN INSULIN 10 UNITS/HR: 100 INJECTION, SOLUTION SUBCUTANEOUS at 23:34

## 2019-01-01 RX ADMIN — LEVOTHYROXINE SODIUM 50 MCG: 50 TABLET ORAL at 08:09

## 2019-01-01 RX ADMIN — LACTULOSE 20 G: 20 SOLUTION ORAL at 13:35

## 2019-01-01 RX ADMIN — OXYCODONE HYDROCHLORIDE 5 MG: 5 TABLET ORAL at 14:32

## 2019-01-01 RX ADMIN — QUETIAPINE FUMARATE 50 MG: 25 TABLET ORAL at 21:01

## 2019-01-01 RX ADMIN — PANTOPRAZOLE SODIUM 40 MG: 40 TABLET, DELAYED RELEASE ORAL at 16:45

## 2019-01-01 RX ADMIN — MICAFUNGIN SODIUM 100 MG: 10 INJECTION, POWDER, LYOPHILIZED, FOR SOLUTION INTRAVENOUS at 16:17

## 2019-01-01 RX ADMIN — LEVOTHYROXINE SODIUM 50 MCG: 50 TABLET ORAL at 09:00

## 2019-01-01 RX ADMIN — SODIUM CHLORIDE, POTASSIUM CHLORIDE, SODIUM LACTATE AND CALCIUM CHLORIDE: 600; 310; 30; 20 INJECTION, SOLUTION INTRAVENOUS at 08:52

## 2019-01-01 RX ADMIN — HUMAN INSULIN 4 UNITS/HR: 100 INJECTION, SOLUTION SUBCUTANEOUS at 18:18

## 2019-01-01 RX ADMIN — HALOPERIDOL LACTATE 2 MG: 5 INJECTION, SOLUTION INTRAMUSCULAR at 23:01

## 2019-01-01 RX ADMIN — RIFAXIMIN 550 MG: 550 TABLET ORAL at 21:13

## 2019-01-01 RX ADMIN — THIAMINE HYDROCHLORIDE 300 MG: 100 INJECTION, SOLUTION INTRAMUSCULAR; INTRAVENOUS at 01:29

## 2019-01-01 RX ADMIN — Medication 2 TABLET: at 20:11

## 2019-01-01 RX ADMIN — MODAFINIL 100 MG: 100 TABLET ORAL at 09:00

## 2019-01-01 RX ADMIN — FUROSEMIDE 20 MG: 20 TABLET ORAL at 16:19

## 2019-01-01 RX ADMIN — LIDOCAINE HYDROCHLORIDE 100 MG: 20 INJECTION, SOLUTION INFILTRATION; PERINEURAL at 09:17

## 2019-01-01 RX ADMIN — ERTAPENEM SODIUM 1 G: 1 INJECTION, POWDER, LYOPHILIZED, FOR SOLUTION INTRAMUSCULAR; INTRAVENOUS at 21:29

## 2019-01-01 RX ADMIN — FERROUS SULFATE TAB 325 MG (65 MG ELEMENTAL FE) 325 MG: 325 (65 FE) TAB at 08:09

## 2019-01-01 RX ADMIN — LIDOCAINE 1 PATCH: 560 PATCH PERCUTANEOUS; TOPICAL; TRANSDERMAL at 21:01

## 2019-01-01 RX ADMIN — THIAMINE HYDROCHLORIDE 500 MG: 100 INJECTION, SOLUTION INTRAMUSCULAR; INTRAVENOUS at 14:06

## 2019-01-01 RX ADMIN — MAGNESIUM 64 MG (MAGNESIUM CHLORIDE) TABLET,DELAYED RELEASE 535 MG: at 09:10

## 2019-01-01 RX ADMIN — CEFTRIAXONE 2 G: 2 INJECTION, POWDER, FOR SOLUTION INTRAMUSCULAR; INTRAVENOUS at 18:03

## 2019-01-01 RX ADMIN — PANTOPRAZOLE SODIUM 40 MG: 40 TABLET, DELAYED RELEASE ORAL at 15:53

## 2019-01-01 RX ADMIN — MAGNESIUM 64 MG (MAGNESIUM CHLORIDE) TABLET,DELAYED RELEASE 535 MG: at 09:00

## 2019-01-01 RX ADMIN — FERROUS SULFATE TAB 325 MG (65 MG ELEMENTAL FE) 325 MG: 325 (65 FE) TAB at 09:12

## 2019-01-01 RX ADMIN — PHYTONADIONE 10 MG: 10 INJECTION, EMULSION INTRAMUSCULAR; INTRAVENOUS; SUBCUTANEOUS at 09:20

## 2019-01-01 RX ADMIN — SENNOSIDES 8.6 MG: 8.6 TABLET, FILM COATED ORAL at 21:34

## 2019-01-01 RX ADMIN — Medication 100 MG: at 09:17

## 2019-01-01 RX ADMIN — CIPROFLOXACIN 400 MG: 2 INJECTION, SOLUTION INTRAVENOUS at 08:03

## 2019-01-01 RX ADMIN — Medication 2 TABLET: at 21:14

## 2019-01-01 RX ADMIN — ONDANSETRON 4 MG: 2 INJECTION INTRAMUSCULAR; INTRAVENOUS at 10:12

## 2019-01-01 RX ADMIN — RIFAXIMIN 550 MG: 550 TABLET ORAL at 08:04

## 2019-01-01 RX ADMIN — RIFAXIMIN 550 MG: 550 TABLET ORAL at 20:36

## 2019-01-01 RX ADMIN — FUROSEMIDE 40 MG: 40 TABLET ORAL at 13:51

## 2019-01-01 RX ADMIN — VANCOMYCIN HYDROCHLORIDE 1750 MG: 1 INJECTION, POWDER, LYOPHILIZED, FOR SOLUTION INTRAVENOUS at 10:46

## 2019-01-01 RX ADMIN — LACTULOSE 20 G: 20 SOLUTION ORAL at 11:12

## 2019-01-01 RX ADMIN — FERROUS SULFATE TAB 325 MG (65 MG ELEMENTAL FE) 325 MG: 325 (65 FE) TAB at 21:01

## 2019-01-01 RX ADMIN — METRONIDAZOLE 500 MG: 500 INJECTION, SOLUTION INTRAVENOUS at 17:07

## 2019-01-01 RX ADMIN — LACTULOSE 20 G: 20 SOLUTION ORAL at 15:53

## 2019-01-01 RX ADMIN — ALBUMIN HUMAN 50 G: 0.25 SOLUTION INTRAVENOUS at 20:47

## 2019-01-01 RX ADMIN — HYDROMORPHONE HYDROCHLORIDE 0.5 MG: 1 INJECTION, SOLUTION INTRAMUSCULAR; INTRAVENOUS; SUBCUTANEOUS at 15:29

## 2019-01-01 RX ADMIN — ALBUMIN HUMAN 25 G: 0.05 INJECTION, SOLUTION INTRAVENOUS at 13:40

## 2019-01-01 RX ADMIN — LEVOTHYROXINE SODIUM 50 MCG: 50 TABLET ORAL at 11:50

## 2019-01-01 RX ADMIN — ERTAPENEM SODIUM 1 G: 1 INJECTION, POWDER, LYOPHILIZED, FOR SOLUTION INTRAMUSCULAR; INTRAVENOUS at 20:28

## 2019-01-01 RX ADMIN — LACTULOSE 20 G: 20 SOLUTION ORAL at 21:13

## 2019-01-01 RX ADMIN — ALBUMIN HUMAN 25 G: 0.25 SOLUTION INTRAVENOUS at 21:43

## 2019-01-01 RX ADMIN — TRAZODONE HYDROCHLORIDE 50 MG: 50 TABLET ORAL at 21:59

## 2019-01-01 RX ADMIN — ALBUMIN HUMAN 25 G: 0.25 SOLUTION INTRAVENOUS at 16:44

## 2019-01-01 RX ADMIN — RIFAXIMIN 550 MG: 550 TABLET ORAL at 08:53

## 2019-01-01 RX ADMIN — Medication 2 TABLET: at 20:04

## 2019-01-01 RX ADMIN — RIFAXIMIN 550 MG: 550 TABLET ORAL at 11:50

## 2019-01-01 RX ADMIN — SODIUM CHLORIDE: 9 INJECTION, SOLUTION INTRAVENOUS at 15:36

## 2019-01-01 RX ADMIN — MAGNESIUM 64 MG (MAGNESIUM CHLORIDE) TABLET,DELAYED RELEASE 535 MG: at 11:51

## 2019-01-01 RX ADMIN — LACTULOSE 20 G: 20 SOLUTION ORAL at 05:20

## 2019-01-01 RX ADMIN — FUROSEMIDE 20 MG: 20 TABLET ORAL at 09:11

## 2019-01-01 RX ADMIN — SODIUM CHLORIDE, POTASSIUM CHLORIDE, SODIUM LACTATE AND CALCIUM CHLORIDE 250 ML: 600; 310; 30; 20 INJECTION, SOLUTION INTRAVENOUS at 23:35

## 2019-01-01 RX ADMIN — HUMAN ALBUMIN MICROSPHERES AND PERFLUTREN 6 ML: 10; .22 INJECTION, SOLUTION INTRAVENOUS at 07:45

## 2019-01-01 RX ADMIN — MAGNESIUM 64 MG (MAGNESIUM CHLORIDE) TABLET,DELAYED RELEASE 535 MG: at 08:09

## 2019-01-01 RX ADMIN — PANTOPRAZOLE SODIUM 40 MG: 40 TABLET, DELAYED RELEASE ORAL at 08:03

## 2019-01-01 RX ADMIN — PANTOPRAZOLE SODIUM 40 MG: 40 TABLET, DELAYED RELEASE ORAL at 17:06

## 2019-01-01 RX ADMIN — Medication 2 TABLET: at 21:01

## 2019-01-01 RX ADMIN — MAGNESIUM 64 MG (MAGNESIUM CHLORIDE) TABLET,DELAYED RELEASE 535 MG: at 08:54

## 2019-01-01 RX ADMIN — ALBUMIN HUMAN 100 G: 0.25 SOLUTION INTRAVENOUS at 11:17

## 2019-01-01 RX ADMIN — MAGNESIUM 64 MG (MAGNESIUM CHLORIDE) TABLET,DELAYED RELEASE 535 MG: at 07:45

## 2019-01-01 RX ADMIN — THIAMINE HYDROCHLORIDE 500 MG: 100 INJECTION, SOLUTION INTRAMUSCULAR; INTRAVENOUS at 05:52

## 2019-01-01 RX ADMIN — THIAMINE HYDROCHLORIDE 500 MG: 100 INJECTION, SOLUTION INTRAMUSCULAR; INTRAVENOUS at 13:54

## 2019-01-01 RX ADMIN — LIDOCAINE 1 PATCH: 560 PATCH PERCUTANEOUS; TOPICAL; TRANSDERMAL at 19:50

## 2019-01-01 RX ADMIN — LEVOTHYROXINE SODIUM 50 MCG: 50 TABLET ORAL at 08:03

## 2019-01-01 RX ADMIN — METRONIDAZOLE 500 MG: 500 INJECTION, SOLUTION INTRAVENOUS at 09:21

## 2019-01-01 RX ADMIN — SODIUM CHLORIDE: 9 INJECTION, SOLUTION INTRAVENOUS at 01:50

## 2019-01-01 RX ADMIN — LACTULOSE 20 G: 20 SOLUTION ORAL at 07:47

## 2019-01-01 RX ADMIN — HUMAN INSULIN 2 UNITS/HR: 100 INJECTION, SOLUTION SUBCUTANEOUS at 05:01

## 2019-01-01 RX ADMIN — POTASSIUM CHLORIDE 20 MEQ: 1.5 POWDER, FOR SOLUTION ORAL at 20:11

## 2019-01-01 RX ADMIN — OXYCODONE HYDROCHLORIDE 5 MG: 5 TABLET ORAL at 06:07

## 2019-01-01 RX ADMIN — ROCURONIUM BROMIDE 30 MG: 10 INJECTION INTRAVENOUS at 09:39

## 2019-01-01 RX ADMIN — LIDOCAINE 1 PATCH: 560 PATCH PERCUTANEOUS; TOPICAL; TRANSDERMAL at 20:18

## 2019-01-01 RX ADMIN — LACTULOSE 20 G: 20 SOLUTION ORAL at 09:30

## 2019-01-01 RX ADMIN — DEXAMETHASONE SODIUM PHOSPHATE 4 MG: 4 INJECTION, SOLUTION INTRA-ARTICULAR; INTRALESIONAL; INTRAMUSCULAR; INTRAVENOUS; SOFT TISSUE at 10:12

## 2019-01-01 RX ADMIN — RIFAXIMIN 550 MG: 550 TABLET ORAL at 07:45

## 2019-01-01 RX ADMIN — Medication 2 TABLET: at 20:36

## 2019-01-01 RX ADMIN — SODIUM CHLORIDE: 9 INJECTION, SOLUTION INTRAVENOUS at 11:20

## 2019-01-01 RX ADMIN — ALBUMIN HUMAN 25 G: 0.25 SOLUTION INTRAVENOUS at 10:04

## 2019-01-01 RX ADMIN — FERROUS SULFATE TAB 325 MG (65 MG ELEMENTAL FE) 325 MG: 325 (65 FE) TAB at 20:11

## 2019-01-01 RX ADMIN — METRONIDAZOLE 500 MG: 500 INJECTION, SOLUTION INTRAVENOUS at 00:58

## 2019-01-01 RX ADMIN — THIAMINE HYDROCHLORIDE 500 MG: 100 INJECTION, SOLUTION INTRAMUSCULAR; INTRAVENOUS at 14:10

## 2019-01-01 RX ADMIN — LACTULOSE 20 G: 20 SOLUTION ORAL at 08:03

## 2019-01-01 RX ADMIN — HUMAN INSULIN 6 UNITS/HR: 100 INJECTION, SOLUTION SUBCUTANEOUS at 10:27

## 2019-01-01 RX ADMIN — LACTULOSE 20 G: 20 SOLUTION ORAL at 14:06

## 2019-01-01 RX ADMIN — LACTULOSE 20 G: 20 SOLUTION ORAL at 21:01

## 2019-01-01 RX ADMIN — LIDOCAINE 1 PATCH: 560 PATCH PERCUTANEOUS; TOPICAL; TRANSDERMAL at 20:22

## 2019-01-01 RX ADMIN — PROPRANOLOL HYDROCHLORIDE 10 MG: 10 TABLET ORAL at 08:03

## 2019-01-01 RX ADMIN — RIFAXIMIN 550 MG: 550 TABLET ORAL at 19:33

## 2019-01-01 RX ADMIN — FERROUS SULFATE TAB 325 MG (65 MG ELEMENTAL FE) 325 MG: 325 (65 FE) TAB at 09:01

## 2019-01-01 RX ADMIN — PANTOPRAZOLE SODIUM 40 MG: 40 TABLET, DELAYED RELEASE ORAL at 08:59

## 2019-01-01 RX ADMIN — VANCOMYCIN HYDROCHLORIDE 250 MG: KIT at 18:34

## 2019-01-01 RX ADMIN — IOPAMIDOL 116 ML: 755 INJECTION, SOLUTION INTRAVENOUS at 23:14

## 2019-01-01 RX ADMIN — PANTOPRAZOLE SODIUM 40 MG: 40 TABLET, DELAYED RELEASE ORAL at 07:45

## 2019-01-01 RX ADMIN — FUROSEMIDE 20 MG: 20 TABLET ORAL at 16:44

## 2019-01-01 RX ADMIN — LACTULOSE 20 G: 20 SOLUTION ORAL at 09:01

## 2019-01-01 RX ADMIN — LACTULOSE 20 G: 20 SOLUTION ORAL at 13:54

## 2019-01-01 RX ADMIN — Medication 2 TABLET: at 08:09

## 2019-01-01 RX ADMIN — VANCOMYCIN HYDROCHLORIDE 1750 MG: 1 INJECTION, POWDER, LYOPHILIZED, FOR SOLUTION INTRAVENOUS at 20:51

## 2019-01-01 RX ADMIN — HALOPERIDOL 2 MG: 2 TABLET ORAL at 23:35

## 2019-01-01 RX ADMIN — PHYTONADIONE 10 MG: 10 INJECTION, EMULSION INTRAMUSCULAR; INTRAVENOUS; SUBCUTANEOUS at 16:30

## 2019-01-01 RX ADMIN — PANTOPRAZOLE SODIUM 40 MG: 40 TABLET, DELAYED RELEASE ORAL at 16:17

## 2019-01-01 RX ADMIN — RIFAXIMIN 550 MG: 550 TABLET ORAL at 21:01

## 2019-01-01 RX ADMIN — THIAMINE HYDROCHLORIDE 500 MG: 100 INJECTION, SOLUTION INTRAMUSCULAR; INTRAVENOUS at 21:41

## 2019-01-01 RX ADMIN — METRONIDAZOLE 500 MG: 500 INJECTION, SOLUTION INTRAVENOUS at 18:10

## 2019-01-01 RX ADMIN — LEVOTHYROXINE SODIUM 50 MCG: 50 TABLET ORAL at 07:45

## 2019-01-01 RX ADMIN — ALBUMIN HUMAN 100 G: 0.25 SOLUTION INTRAVENOUS at 21:21

## 2019-01-01 RX ADMIN — FERROUS SULFATE TAB 325 MG (65 MG ELEMENTAL FE) 325 MG: 325 (65 FE) TAB at 20:21

## 2019-01-01 RX ADMIN — SENNOSIDES AND DOCUSATE SODIUM 2 TABLET: 8.6; 5 TABLET ORAL at 21:13

## 2019-01-01 RX ADMIN — LACTULOSE 20 G: 20 SOLUTION ORAL at 13:34

## 2019-01-01 RX ADMIN — LACTULOSE 20 G: 20 SOLUTION ORAL at 17:04

## 2019-01-01 RX ADMIN — MODAFINIL 100 MG: 100 TABLET ORAL at 11:50

## 2019-01-01 RX ADMIN — ERTAPENEM SODIUM 1 G: 1 INJECTION, POWDER, LYOPHILIZED, FOR SOLUTION INTRAMUSCULAR; INTRAVENOUS at 20:45

## 2019-01-01 RX ADMIN — FERROUS SULFATE TAB 325 MG (65 MG ELEMENTAL FE) 325 MG: 325 (65 FE) TAB at 20:36

## 2019-01-01 RX ADMIN — LACTULOSE 20 G: 20 SOLUTION ORAL at 20:04

## 2019-01-01 RX ADMIN — LACTULOSE 20 G: 20 SOLUTION ORAL at 09:10

## 2019-01-01 RX ADMIN — CIPROFLOXACIN 400 MG: 2 INJECTION, SOLUTION INTRAVENOUS at 21:12

## 2019-01-01 RX ADMIN — LACTULOSE 20 G: 20 SOLUTION ORAL at 19:49

## 2019-01-01 RX ADMIN — SODIUM CHLORIDE 500 ML: 9 INJECTION, SOLUTION INTRAVENOUS at 21:12

## 2019-01-01 RX ADMIN — TRAZODONE HYDROCHLORIDE 50 MG: 50 TABLET ORAL at 21:48

## 2019-01-01 RX ADMIN — LEVOTHYROXINE SODIUM 50 MCG: 50 TABLET ORAL at 09:11

## 2019-01-01 RX ADMIN — ERTAPENEM SODIUM 1 G: 1 INJECTION, POWDER, LYOPHILIZED, FOR SOLUTION INTRAMUSCULAR; INTRAVENOUS at 20:16

## 2019-01-01 RX ADMIN — Medication 1 MG: at 20:05

## 2019-01-01 RX ADMIN — LACTULOSE 20 G: 20 SOLUTION ORAL at 21:42

## 2019-01-01 RX ADMIN — LACTULOSE 20 G: 20 SOLUTION ORAL at 02:47

## 2019-01-01 RX ADMIN — LACTULOSE 20 G: 20 SOLUTION ORAL at 05:51

## 2019-01-01 RX ADMIN — RIFAXIMIN 550 MG: 550 TABLET ORAL at 08:09

## 2019-01-01 RX ADMIN — OXYCODONE HYDROCHLORIDE 5 MG: 5 TABLET ORAL at 00:04

## 2019-01-01 RX ADMIN — OLANZAPINE 5 MG: 5 TABLET, ORALLY DISINTEGRATING ORAL at 21:48

## 2019-01-01 RX ADMIN — LACTULOSE 20 G: 20 SOLUTION ORAL at 08:09

## 2019-01-01 RX ADMIN — ALBUMIN HUMAN 100 G: 0.25 SOLUTION INTRAVENOUS at 09:21

## 2019-01-01 RX ADMIN — FERROUS SULFATE TAB 325 MG (65 MG ELEMENTAL FE) 325 MG: 325 (65 FE) TAB at 20:23

## 2019-01-01 RX ADMIN — LEVOTHYROXINE SODIUM 50 MCG: 50 TABLET ORAL at 08:54

## 2019-01-01 RX ADMIN — MODAFINIL 100 MG: 100 TABLET ORAL at 07:55

## 2019-01-01 RX ADMIN — FERROUS SULFATE TAB 325 MG (65 MG ELEMENTAL FE) 325 MG: 325 (65 FE) TAB at 08:54

## 2019-01-01 RX ADMIN — LACTULOSE 20 G: 20 SOLUTION ORAL at 08:54

## 2019-01-01 RX ADMIN — CYCLOBENZAPRINE HYDROCHLORIDE 10 MG: 10 TABLET, FILM COATED ORAL at 05:37

## 2019-01-01 RX ADMIN — LACTULOSE 20 G: 20 SOLUTION ORAL at 20:11

## 2019-01-01 RX ADMIN — LIDOCAINE 2 PATCH: 560 PATCH PERCUTANEOUS; TOPICAL; TRANSDERMAL at 20:36

## 2019-01-01 ASSESSMENT — ACTIVITIES OF DAILY LIVING (ADL)
ADLS_ACUITY_SCORE: 13
ADLS_ACUITY_SCORE: 17
ADLS_ACUITY_SCORE: 14
ADLS_ACUITY_SCORE: 11.5
ADLS_ACUITY_SCORE: 15
ADLS_ACUITY_SCORE: 11.5
ADLS_ACUITY_SCORE: 17
ADLS_ACUITY_SCORE: 14
ADLS_ACUITY_SCORE: 11.5
ADLS_ACUITY_SCORE: 15
ADLS_ACUITY_SCORE: 17
ADLS_ACUITY_SCORE: 13
ADLS_ACUITY_SCORE: 15
ADLS_ACUITY_SCORE: 13
ADLS_ACUITY_SCORE: 14
ADLS_ACUITY_SCORE: 15
ADLS_ACUITY_SCORE: 13
ADLS_ACUITY_SCORE: 17
ADLS_ACUITY_SCORE: 15
ADLS_ACUITY_SCORE: 15
ADLS_ACUITY_SCORE: 17
ADLS_ACUITY_SCORE: 11.5
ADLS_ACUITY_SCORE: 15
ADLS_ACUITY_SCORE: 11.5
ADLS_ACUITY_SCORE: 14
ADLS_ACUITY_SCORE: 14
ADLS_ACUITY_SCORE: 13
ADLS_ACUITY_SCORE: 11.5
ADLS_ACUITY_SCORE: 15
ADLS_ACUITY_SCORE: 17
ADLS_ACUITY_SCORE: 14
ADLS_ACUITY_SCORE: 13
ADLS_ACUITY_SCORE: 11.5
ADLS_ACUITY_SCORE: 11.5
ADLS_ACUITY_SCORE: 17
ADLS_ACUITY_SCORE: 17
ADLS_ACUITY_SCORE: 11.5
ADLS_ACUITY_SCORE: 15
ADLS_ACUITY_SCORE: 17
ADLS_ACUITY_SCORE: 17

## 2019-01-01 ASSESSMENT — PAIN SCALES - GENERAL
PAINLEVEL: EXTREME PAIN (8)
PAINLEVEL: NO PAIN (0)

## 2019-01-01 ASSESSMENT — COPD QUESTIONNAIRES
COPD: 1
CAT_SEVERITY: MILD

## 2019-02-05 NOTE — LETTER
2/5/2019      RE: Charanjit Ibarra  [address]  [facility] [address]       [facility]    Hepatology follow-up    CHIEF COMPLAINT/REASON FOR THE VISIT:  Alcoholic cirrhosis.       SUBJECTIVE:  Mr. Ibarra is a 59-year-old white male whom we have seen here for liver transplantation.  He has not been listed yet.  He has complications from his liver disease and this included GI bleed which was related with variceal bleed.  He had banding and did well.  He had previously required paracentesis, but he has not required paracentesis for quite a while now and he has some edema.  Since I last saw him he denies any melena, hematemesis or hematochezia.  He has no jaundice.  He has no pruritus.  He has no significant confusion but has some memory issues and naps during the day.  His weight has remained stable.  His main issue is that he has significant back pain and neck pain and he follows with his primary care physician, although yesterday he was seen at a pain clinic in [place] and he was told that he might be considered for an electric stimulator.  Otherwise today he is doing relatively well.  No nausea or vomiting.  He is moving his bowels 3 times a day and has no new complaints.     Medical hx Surgical hx   Past Medical History:   Diagnosis Date     Alcoholic cirrhosis (H)      Ascites      DJD (degenerative joint disease)      Hypothyroid      Long term (current) use of opiate analgesic       No past surgical history on file.       Medications  Prior to Admission medications    Medication Sig Start Date End Date Taking? Authorizing Provider   artificial saliva (BIOTENE MT) SOLN solution Swish and spit in mouth as needed for dry mouth   Yes Reported, Patient   calcium citrate-vitamin D (CALCIUM CITRATE + D3) 315-250 MG-UNIT TABS per tablet Take 2 tablets by mouth 2 times daily 5/9/18  Yes Angelito Dietz MD   cyclobenzaprine (FLEXERIL) 10 MG tablet Take 1 Tab by mouth 3 times  a day as needed for Muscle Spasms. 11/10/17  Yes Reported, Patient   diclofenac (VOLTAREN) 1 % GEL topical gel Place onto the skin daily as needed for moderate pain   Yes Reported, Patient   ferrous sulfate (IRON) 325 (65 FE) MG tablet Take 325 mg by mouth 6/27/16  Yes Reported, Patient   furosemide (LASIX) 20 MG tablet Take 40 mg in a.m. and 20 mg in afternoon 8/30/17  Yes Reported, Patient   lactulose (CHRONULAC) 10 GM/15ML solution Take 30 mL by mouth three times daily 8/30/17  Yes Reported, Patient   levothyroxine (SYNTHROID/LEVOTHROID) 50 MCG tablet Take 50 mcg by mouth 3/27/17  Yes Reported, Patient   Magnesium 65 MG TABS Take 64 mg by mouth 8/30/17  Yes Reported, Patient   modafinil (PROVIGIL) 100 MG tablet Take 100 mg by mouth daily   Yes Reported, Patient   Multiple Vitamins-Minerals (MULTIVITAMIN PO) Take 1 tablet by mouth daily   Yes Reported, Patient   omeprazole (PRILOSEC OTC) 20 MG tablet Take 20 mg by mouth 2 times daily  8/30/17  Yes Reported, Patient   oxyCODONE IR (ROXICODONE) 15 MG tablet Take 15-30 mg by mouth every 6 hours as needed for moderate to severe pain   Yes Reported, Patient   propranolol (INDERAL) 10 MG tablet Take 10 mg by mouth 3 times daily  10/23/17  Yes Reported, Patient   rifaximin (XIFAXAN) 550 MG TABS tablet Take 550 mg by mouth 2 times daily  11/30/17  Yes Reported, Patient   spironolactone (ALDACTONE) 50 MG tablet Take 25 mg by mouth daily  8/30/17  Yes Reported, Patient       Allergies  Allergies   Allergen Reactions     Aspirin Other (See Comments)     Pt states that NSAIDS severely lowers his platelets     Nsaids Other (See Comments)     Pt states that NSAIDS severely lowers his platelets     Amoxicillin Swelling       Review of systems  A 10-point review of systems was negative    Examination  There were no vitals taken for this visit.  There is no height or weight on file to calculate BMI.    Gen- well, NAD, A+Ox3, normal color  Lym- no palpable LAD  CVS- RRR  RS-  CTA  Abd- Not distended. No hepatosplenomegaly.  Extr- hands normal, no TIMOTHY  Skin- no rash or jaundice  Neuro- no asterixis  Psych- normal mood    Laboratory  Lab Results   Component Value Date     02/05/2019    POTASSIUM 4.5 02/05/2019    CHLORIDE 103 02/05/2019    CO2 26 02/05/2019    BUN 18 02/05/2019    CR 1.08 02/05/2019       Lab Results   Component Value Date    BILITOTAL 5.1 02/05/2019    ALT 23 02/05/2019    AST 29 02/05/2019    ALKPHOS 153 02/05/2019       Lab Results   Component Value Date    ALBUMIN 3.2 02/05/2019    PROTTOTAL 7.3 02/05/2019        Lab Results   Component Value Date    WBC 5.3 02/05/2019    HGB 13.3 02/05/2019    MCV 96 02/05/2019    PLT 65 02/05/2019       Lab Results   Component Value Date    INR 1.85 02/05/2019       Radiology    Assessment  ASSESSMENT AND PLAN:  Mr. Ibarra is a 59-year-old white male who carries a diagnosis of alcoholic cirrhosis.  He has a MELD score of 22 on his labs of today.  He will need to fulfill the following criteria before being listed.  He needs to have a good conversation with our  and find out his support system and we will get information from the [facility] center where he was seen yesterday.  Once we have that and we clear him with it and finishes his evaluation will present him next Tuesday, at which time we will make decision of listing him.  He will need also to go on surveillance for HCC and that will be done with imaging and in fact his last ultrasound that we have is a year ago, so we did put an order in and he needs to do an ultrasound as per our orders.  The last endoscopy we have in our system is 12/2017 at which time he had a long segment Moreno's esophagus and portal hypertensive gastropathy but no esophageal varices, so that needs to be repeated and we will order that.  Besides that, he will be seen here in 3 months, at which time we are confident that he will finish the full evaluation and get listed.      This was a  25-minute visit, of which more than 50% was spent in explaining to the patient what our plan of care was and we answered all of his questions.   Angelito Dietz MD      cc:  Primary care physician

## 2019-02-05 NOTE — PROGRESS NOTES
[facility]    Hepatology follow-up    CHIEF COMPLAINT/REASON FOR THE VISIT:  Alcoholic cirrhosis.       SUBJECTIVE:  Mr. Ibarra is a 59-year-old white male whom we have seen here for liver transplantation.  He has not been listed yet.  He has complications from his liver disease and this included GI bleed which was related with variceal bleed.  He had banding and did well.  He had previously required paracentesis, but he has not required paracentesis for quite a while now and he has some edema.  Since I last saw him he denies any melena, hematemesis or hematochezia.  He has no jaundice.  He has no pruritus.  He has no significant confusion but has some memory issues and naps during the day.  His weight has remained stable.  His main issue is that he has significant back pain and neck pain and he follows with his primary care physician, although yesterday he was seen at a pain clinic in [place] and he was told that he might be considered for an electric stimulator.  Otherwise today he is doing relatively well.  No nausea or vomiting.  He is moving his bowels 3 times a day and has no new complaints.     Medical hx Surgical hx   Past Medical History:   Diagnosis Date     Alcoholic cirrhosis (H)      Ascites      DJD (degenerative joint disease)      Hypothyroid      Long term (current) use of opiate analgesic       No past surgical history on file.       Medications  Prior to Admission medications    Medication Sig Start Date End Date Taking? Authorizing Provider   artificial saliva (BIOTENE MT) SOLN solution Swish and spit in mouth as needed for dry mouth   Yes Reported, Patient   calcium citrate-vitamin D (CALCIUM CITRATE + D3) 315-250 MG-UNIT TABS per tablet Take 2 tablets by mouth 2 times daily 5/9/18  Yes Angelito Dietz MD   cyclobenzaprine (FLEXERIL) 10 MG tablet Take 1 Tab by mouth 3 times a day as needed for Muscle Spasms. 11/10/17  Yes Reported, Patient    diclofenac (VOLTAREN) 1 % GEL topical gel Place onto the skin daily as needed for moderate pain   Yes Reported, Patient   ferrous sulfate (IRON) 325 (65 FE) MG tablet Take 325 mg by mouth 6/27/16  Yes Reported, Patient   furosemide (LASIX) 20 MG tablet Take 40 mg in a.m. and 20 mg in afternoon 8/30/17  Yes Reported, Patient   lactulose (CHRONULAC) 10 GM/15ML solution Take 30 mL by mouth three times daily 8/30/17  Yes Reported, Patient   levothyroxine (SYNTHROID/LEVOTHROID) 50 MCG tablet Take 50 mcg by mouth 3/27/17  Yes Reported, Patient   Magnesium 65 MG TABS Take 64 mg by mouth 8/30/17  Yes Reported, Patient   modafinil (PROVIGIL) 100 MG tablet Take 100 mg by mouth daily   Yes Reported, Patient   Multiple Vitamins-Minerals (MULTIVITAMIN PO) Take 1 tablet by mouth daily   Yes Reported, Patient   omeprazole (PRILOSEC OTC) 20 MG tablet Take 20 mg by mouth 2 times daily  8/30/17  Yes Reported, Patient   oxyCODONE IR (ROXICODONE) 15 MG tablet Take 15-30 mg by mouth every 6 hours as needed for moderate to severe pain   Yes Reported, Patient   propranolol (INDERAL) 10 MG tablet Take 10 mg by mouth 3 times daily  10/23/17  Yes Reported, Patient   rifaximin (XIFAXAN) 550 MG TABS tablet Take 550 mg by mouth 2 times daily  11/30/17  Yes Reported, Patient   spironolactone (ALDACTONE) 50 MG tablet Take 25 mg by mouth daily  8/30/17  Yes Reported, Patient       Allergies  Allergies   Allergen Reactions     Aspirin Other (See Comments)     Pt states that NSAIDS severely lowers his platelets     Nsaids Other (See Comments)     Pt states that NSAIDS severely lowers his platelets     Amoxicillin Swelling       Review of systems  A 10-point review of systems was negative    Examination  There were no vitals taken for this visit.  There is no height or weight on file to calculate BMI.    Gen- well, NAD, A+Ox3, normal color  Lym- no palpable LAD  CVS- RRR  RS- CTA  Abd- Not distended. No hepatosplenomegaly.  Extr- hands normal, no  TIMOTHY  Skin- no rash or jaundice  Neuro- no asterixis  Psych- normal mood    Laboratory  Lab Results   Component Value Date     02/05/2019    POTASSIUM 4.5 02/05/2019    CHLORIDE 103 02/05/2019    CO2 26 02/05/2019    BUN 18 02/05/2019    CR 1.08 02/05/2019       Lab Results   Component Value Date    BILITOTAL 5.1 02/05/2019    ALT 23 02/05/2019    AST 29 02/05/2019    ALKPHOS 153 02/05/2019       Lab Results   Component Value Date    ALBUMIN 3.2 02/05/2019    PROTTOTAL 7.3 02/05/2019        Lab Results   Component Value Date    WBC 5.3 02/05/2019    HGB 13.3 02/05/2019    MCV 96 02/05/2019    PLT 65 02/05/2019       Lab Results   Component Value Date    INR 1.85 02/05/2019       Radiology    Assessment  ASSESSMENT AND PLAN:  Mr. Ibarra is a 59-year-old white male who carries a diagnosis of alcoholic cirrhosis.  He has a MELD score of 22 on his labs of today.  He will need to fulfill the following criteria before being listed.  He needs to have a good conversation with our  and find out his support system and we will get information from the [facility] where he was seen yesterday.  Once we have that and we clear him with it and finishes his evaluation will present him next Tuesday, at which time we will make decision of listing him.  He will need also to go on surveillance for HCC and that will be done with imaging and in fact his last ultrasound that we have is a year ago, so we did put an order in and he needs to do an ultrasound as per our orders.  The last endoscopy we have in our system is 12/2017 at which time he had a long segment Moreno's esophagus and portal hypertensive gastropathy but no esophageal varices, so that needs to be repeated and we will order that.  Besides that, he will be seen here in 3 months, at which time we are confident that he will finish the full evaluation and get listed.      This was a 25-minute visit, of which more than 50% was spent in explaining to the  patient what our plan of care was and we answered all of his questions.      cc:  Primary care physician       Angelito Dietz MD  Hepatology  [facility]

## 2019-02-05 NOTE — PROGRESS NOTES
Assessment and Plan:  1. liver transplant evaluation - patient is a fair candidate overall. Benefits and surgical risks of a liver transplantation were discussed.  2.  End stage liver disease due to Laennec's    Surgical evaluation:  1. Portal Vein:Patent from old imaging 2017; more recent imaging from outside does not comment on portal vein and actual imaging is not available  2. Hepatic Artery: Open from old imaging  3. TIPS: absent  4. Previous Abdominal Surgery: umbilical hernia  5. Hepatocellular Carcinoma: None  6. Ascites: Present - minimal - has not had paracentesis for over 1 year  7. Costal Angle: wide  8. Portopulmonary Hypertension: absent on 12/17 echo  9. Hepatopulmonary Syndrome: absent  10. Cardiac Evaluation: would need to be repeated if activated  11. Nutritional Status: Good  12. Diabetes: no  13.Hypertension no  14. Smoker:no  15: being seen at the pain clinic, wants to switch to medical Marijuana       Recommendations:   - currently stable at MELD 22, if will be listed, will need updated cardiac w/up and imaging, as well as plan for his pain management  - SW      Patients overall evaluation will be discussed at the Liver Transplant selection committee meeting with a final recommendation on the patients suitability for transplant to be made at that time.    Consult Full  Details:  Charanjit Ibarra was seen in consultation at the request of Dr. Dietz for evaluation as a potential liver transplant recipient.    Reason for Visit:  Charanjit Ibarra is a 59 year old year old male with Laennec's, who presents for liver transplant evaluation.    HPI:  Doing well  MELD 22  No ascites, no encephalopathy  Last variceal bleeding 2/2017    Past Medical History:   Diagnosis Date     Alcoholic cirrhosis (H)      Ascites      DJD (degenerative joint disease)      Hypothyroid      Long term (current) use of opiate analgesic      No past surgical history on file.  No past surgical history on file.  Family  History   Problem Relation Age of Onset     Diabetes Type 2  Father      Hypothyroidism Sister      Hypothyroidism Brother      Allergies   Allergen Reactions     Aspirin Other (See Comments)     Pt states that NSAIDS severely lowers his platelets     Nsaids Other (See Comments)     Pt states that NSAIDS severely lowers his platelets     Amoxicillin Swelling     Prior to Admission medications    Medication Sig Start Date End Date Taking? Authorizing Provider   artificial saliva (BIOTENE MT) SOLN solution Swish and spit in mouth as needed for dry mouth   Yes Reported, Patient   calcium citrate-vitamin D (CALCIUM CITRATE + D3) 315-250 MG-UNIT TABS per tablet Take 2 tablets by mouth 2 times daily 5/9/18  Yes Angelito Dietz MD   cyclobenzaprine (FLEXERIL) 10 MG tablet Take 1 Tab by mouth 3 times a day as needed for Muscle Spasms. 11/10/17  Yes Reported, Patient   diclofenac (VOLTAREN) 1 % GEL topical gel Place onto the skin daily as needed for moderate pain   Yes Reported, Patient   ferrous sulfate (IRON) 325 (65 FE) MG tablet Take 325 mg by mouth 6/27/16  Yes Reported, Patient   furosemide (LASIX) 20 MG tablet Take 40 mg in a.m. and 20 mg in afternoon 8/30/17  Yes Reported, Patient   lactulose (CHRONULAC) 10 GM/15ML solution Take 30 mL by mouth three times daily 8/30/17  Yes Reported, Patient   levothyroxine (SYNTHROID/LEVOTHROID) 50 MCG tablet Take 50 mcg by mouth 3/27/17  Yes Reported, Patient   Magnesium 65 MG TABS Take 64 mg by mouth 8/30/17  Yes Reported, Patient   modafinil (PROVIGIL) 100 MG tablet Take 100 mg by mouth daily   Yes Reported, Patient   Multiple Vitamins-Minerals (MULTIVITAMIN PO) Take 1 tablet by mouth daily   Yes Reported, Patient   omeprazole (PRILOSEC OTC) 20 MG tablet Take 20 mg by mouth 2 times daily  8/30/17  Yes Reported, Patient   oxyCODONE IR (ROXICODONE) 15 MG tablet Take 15-30 mg by mouth every 6 hours as needed for moderate to severe pain   Yes Reported, Patient    propranolol (INDERAL) 10 MG tablet Take 10 mg by mouth 3 times daily  10/23/17  Yes Reported, Patient   rifaximin (XIFAXAN) 550 MG TABS tablet Take 550 mg by mouth 2 times daily  11/30/17  Yes Reported, Patient   spironolactone (ALDACTONE) 50 MG tablet Take 25 mg by mouth daily  8/30/17  Yes Reported, Patient       Previous Transplant Hx: No    Cardiovascular Hx:       h/o Cardiac Issues: No       Exercise Tolerance: no chest pain or shortness of breath with exertion.    Potential Donor(s): No    ROS:    REVIEW OF SYSTEMS (check box if normal)  [x]                GENERAL  [x]                  PULMONARY [x]                 GENITOURINARY  [x]                 CNS                 [x]                  CARDIAC  [x]                  ENDOCRINE  [x]                 EARS,NOSE,THROAT [x]                  GASTROINTESTINAL [x]                  NEUROLOGIC    [x]                 MUSCLOSKELTAL  [x]                   HEMATOLOGY    Examination:     Vitals:  /71   Pulse 68   Temp 98.2  F (36.8  C)   Wt 84.7 kg (186 lb 11.2 oz)   SpO2 97%   BMI 28.39 kg/m      GENERAL APPEARANCE: alert and no distress  EYES: PERRL  HENT: mouth without ulcers or lesions  NECK: supple, no adenopathy  RESP: lungs clear to auscultation - no rales, rhonchi or wheezes  CV: regular rhythm, normal rate, no rub   ABDOMEN:  soft, nontender, no HSM or masses and bowel sounds normal  MS: extremities normal- no gross deformities noted, no evidence of inflammation in joints, no muscle tenderness  SKIN: no rash  NEURO: Normal strength and tone, sensory exam grossly normal, mentation intact and speech normal  PSYCH: mentation appears normal. and affect normal/bright      Results:   Recent Results (from the past 168 hour(s))   INR    Collection Time: 02/05/19  9:42 AM   Result Value Ref Range    INR 1.85 (H) 0.86 - 1.14   Hepatic panel    Collection Time: 02/05/19  9:42 AM   Result Value Ref Range    Bilirubin Direct 1.2 (H) 0.0 - 0.2 mg/dL    Bilirubin  Total 5.1 (H) 0.2 - 1.3 mg/dL    Albumin 3.2 (L) 3.4 - 5.0 g/dL    Protein Total 7.3 6.8 - 8.8 g/dL    Alkaline Phosphatase 153 (H) 40 - 150 U/L    ALT 23 0 - 70 U/L    AST 29 0 - 45 U/L   Basic metabolic panel    Collection Time: 02/05/19  9:42 AM   Result Value Ref Range    Sodium 135 133 - 144 mmol/L    Potassium 4.5 3.4 - 5.3 mmol/L    Chloride 103 94 - 109 mmol/L    Carbon Dioxide 26 20 - 32 mmol/L    Anion Gap 6 3 - 14 mmol/L    Glucose 134 (H) 70 - 99 mg/dL    Urea Nitrogen 18 7 - 30 mg/dL    Creatinine 1.08 0.66 - 1.25 mg/dL    GFR Estimate 74 >60 mL/min/[1.73_m2]    GFR Estimate If Black 86 >60 mL/min/[1.73_m2]    Calcium 8.8 8.5 - 10.1 mg/dL   CBC with platelets    Collection Time: 02/05/19  9:42 AM   Result Value Ref Range    WBC 5.3 4.0 - 11.0 10e9/L    RBC Count 4.08 (L) 4.4 - 5.9 10e12/L    Hemoglobin 13.3 13.3 - 17.7 g/dL    Hematocrit 39.2 (L) 40.0 - 53.0 %    MCV 96 78 - 100 fl    MCH 32.6 26.5 - 33.0 pg    MCHC 33.9 31.5 - 36.5 g/dL    RDW 16.0 (H) 10.0 - 15.0 %    Platelet Count 65 (L) 150 - 450 10e9/L     I had a long discussion with the patient regarding liver transplantation which included but was not limited to  the following points:    1. Liver transplant selection committee process.  2. The federal rules for cadaveric waiting list, the size and blood type matching of the organ. The availability of living-related donor transplantation.  3. The types of donors: brain death donors, non-heart beating donors, partial liver grafts: splits and living donor grafts  4. Extended criteria  Donors (older age, steasosis) and the increased  risk of primary non-function using the extended criteria donors  5. The CDC high risk donors,  Risk of donor transmitted infections and donor transmitted malignancy  6. The liver transplant operation and the associated risks and technical complications which can include intraoperative death, post operative death,  Primary non-function, bleeding requiring re-operations,  arterial and biliary complications, bowel perforations, and intra abdominal abscess. Some of these complicaitons may require a second operation.  7. The postoperative course, the ICU stay and risk of postoperative complications which can include sepsis, MI, stroke, brain injury, pneumonia, pleural effusions, and renal dysfunction.  8. The current 1 year and 5 year graft and patient survivals.  9. The need for life long immunosuppressive therapy and the side effects of these medications, including the possibility of toxicity, opportunistic infections, risk of cancer including lymphoma, and the possibility of rejection even if the patient is taking the medication exactly as prescribed.  10. The need for compliance with medications and follow-up visits in the clinic and thereafter.  11. The patient and family understand these risks and wish to proceed to transplantation       I spent 60 minutes with the patient and more than 50% of the time was spend in direct face to face counseling.

## 2019-02-05 NOTE — LETTER
2/5/2019       RE: Charanjit Ibarra  [address]  [place] [address]     Dear Colleague,    Thank you for referring your patient, Charanjit Ibarra, to the [facility] HEPATOLOGY at [facility]. Please see a copy of my visit note below.    [facility]    Hepatology follow-up    CHIEF COMPLAINT/REASON FOR THE VISIT:  Alcoholic cirrhosis.       SUBJECTIVE:  Mr. Ibarra is a 59-year-old white male whom we have seen here for liver transplantation.  He has not been listed yet.  He has complications from his liver disease and this included GI bleed which was related with variceal bleed.  He had banding and did well.  He had previously required paracentesis, but he has not required paracentesis for quite a while now and he has some edema.  Since I last saw him he denies any melena, hematemesis or hematochezia.  He has no jaundice.  He has no pruritus.  He has no significant confusion but has some memory issues and naps during the day.  His weight has remained stable.  His main issue is that he has significant back pain and neck pain and he follows with his primary care physician, although yesterday he was seen at a pain clinic in [place] and he was told that he might be considered for an electric stimulator.  Otherwise today he is doing relatively well.  No nausea or vomiting.  He is moving his bowels 3 times a day and has no new complaints.     Medical hx Surgical hx   Past Medical History:   Diagnosis Date     Alcoholic cirrhosis (H)      Ascites      DJD (degenerative joint disease)      Hypothyroid      Long term (current) use of opiate analgesic       No past surgical history on file.       Medications  Prior to Admission medications    Medication Sig Start Date End Date Taking? Authorizing Provider   artificial saliva (BIOTENE MT) SOLN solution Swish and spit in mouth as needed for dry mouth   Yes Reported, Patient   calcium citrate-vitamin D  (CALCIUM CITRATE + D3) 315-250 MG-UNIT TABS per tablet Take 2 tablets by mouth 2 times daily 5/9/18  Yes Angelito Dietz MD   cyclobenzaprine (FLEXERIL) 10 MG tablet Take 1 Tab by mouth 3 times a day as needed for Muscle Spasms. 11/10/17  Yes Reported, Patient   diclofenac (VOLTAREN) 1 % GEL topical gel Place onto the skin daily as needed for moderate pain   Yes Reported, Patient   ferrous sulfate (IRON) 325 (65 FE) MG tablet Take 325 mg by mouth 6/27/16  Yes Reported, Patient   furosemide (LASIX) 20 MG tablet Take 40 mg in a.m. and 20 mg in afternoon 8/30/17  Yes Reported, Patient   lactulose (CHRONULAC) 10 GM/15ML solution Take 30 mL by mouth three times daily 8/30/17  Yes Reported, Patient   levothyroxine (SYNTHROID/LEVOTHROID) 50 MCG tablet Take 50 mcg by mouth 3/27/17  Yes Reported, Patient   Magnesium 65 MG TABS Take 64 mg by mouth 8/30/17  Yes Reported, Patient   modafinil (PROVIGIL) 100 MG tablet Take 100 mg by mouth daily   Yes Reported, Patient   Multiple Vitamins-Minerals (MULTIVITAMIN PO) Take 1 tablet by mouth daily   Yes Reported, Patient   omeprazole (PRILOSEC OTC) 20 MG tablet Take 20 mg by mouth 2 times daily  8/30/17  Yes Reported, Patient   oxyCODONE IR (ROXICODONE) 15 MG tablet Take 15-30 mg by mouth every 6 hours as needed for moderate to severe pain   Yes Reported, Patient   propranolol (INDERAL) 10 MG tablet Take 10 mg by mouth 3 times daily  10/23/17  Yes Reported, Patient   rifaximin (XIFAXAN) 550 MG TABS tablet Take 550 mg by mouth 2 times daily  11/30/17  Yes Reported, Patient   spironolactone (ALDACTONE) 50 MG tablet Take 25 mg by mouth daily  8/30/17  Yes Reported, Patient       Allergies  Allergies   Allergen Reactions     Aspirin Other (See Comments)     Pt states that NSAIDS severely lowers his platelets     Nsaids Other (See Comments)     Pt states that NSAIDS severely lowers his platelets     Amoxicillin Swelling       Review of systems  A 10-point review of systems  was negative    Examination  There were no vitals taken for this visit.  There is no height or weight on file to calculate BMI.    Gen- well, NAD, A+Ox3, normal color  Lym- no palpable LAD  CVS- RRR  RS- CTA  Abd- Not distended. No hepatosplenomegaly.  Extr- hands normal, no TIMOTHY  Skin- no rash or jaundice  Neuro- no asterixis  Psych- normal mood    Laboratory  Lab Results   Component Value Date     02/05/2019    POTASSIUM 4.5 02/05/2019    CHLORIDE 103 02/05/2019    CO2 26 02/05/2019    BUN 18 02/05/2019    CR 1.08 02/05/2019       Lab Results   Component Value Date    BILITOTAL 5.1 02/05/2019    ALT 23 02/05/2019    AST 29 02/05/2019    ALKPHOS 153 02/05/2019       Lab Results   Component Value Date    ALBUMIN 3.2 02/05/2019    PROTTOTAL 7.3 02/05/2019        Lab Results   Component Value Date    WBC 5.3 02/05/2019    HGB 13.3 02/05/2019    MCV 96 02/05/2019    PLT 65 02/05/2019       Lab Results   Component Value Date    INR 1.85 02/05/2019       Radiology    Assessment  ASSESSMENT AND PLAN:  Mr. Ibarra is a 59-year-old white male who carries a diagnosis of alcoholic cirrhosis.  He has a MELD score of 22 on his labs of today.  He will need to fulfill the following criteria before being listed.  He needs to have a good conversation with our  and find out his support system and we will get information from the [facility] center where he was seen yesterday.  Once we have that and we clear him with it and finishes his evaluation will present him next Tuesday, at which time we will make decision of listing him.  He will need also to go on surveillance for HCC and that will be done with imaging and in fact his last ultrasound that we have is a year ago, so we did put an order in and he needs to do an ultrasound as per our orders.  The last endoscopy we have in our system is 12/2017 at which time he had a long segment Moreno's esophagus and portal hypertensive gastropathy but no esophageal varices, so  that needs to be repeated and we will order that.  Besides that, he will be seen here in 3 months, at which time we are confident that he will finish the full evaluation and get listed.      This was a 25-minute visit, of which more than 50% was spent in explaining to the patient what our plan of care was and we answered all of his questions.        Angelito Dietz MD      cc:  Primary care physician

## 2019-02-05 NOTE — LETTER
2/5/2019      RE: Charanjit Ibarra  [address]  [place] [address]       Chief Complaint   Patient presents with     RECHECK     f/u wait list     Blood pressure 128/71, pulse 68, temperature 98.2  F (36.8  C), weight 84.7 kg (186 lb 11.2 oz), SpO2 97 %.    Anna Marie Bernal CMA         Assessment and Plan:  1. liver transplant evaluation - patient is a fair candidate overall. Benefits and surgical risks of a liver transplantation were discussed.  2.  End stage liver disease due to Laennec's    Surgical evaluation:  1. Portal Vein:Patent from old imaging 2017; more recent imaging from outside does not comment on portal vein and actual imaging is not available  2. Hepatic Artery: Open from old imaging  3. TIPS: absent  4. Previous Abdominal Surgery: umbilical hernia  5. Hepatocellular Carcinoma: None  6. Ascites: Present - minimal - has not had paracentesis for over 1 year  7. Costal Angle: wide  8. Portopulmonary Hypertension: absent on 12/17 echo  9. Hepatopulmonary Syndrome: absent  10. Cardiac Evaluation: would need to be repeated if activated  11. Nutritional Status: Good  12. Diabetes: no  13.Hypertension no  14. Smoker:no  15: being seen at the pain clinic, wants to switch to medical Marijuana       Recommendations:   - currently stable at MELD 22, if will be listed, will need updated cardiac w/up and imaging, as well as plan for his pain management  - SW      Patients overall evaluation will be discussed at the Liver Transplant selection committee meeting with a final recommendation on the patients suitability for transplant to be made at that time.    Consult Full  Details:  Charanjit Ibarra was seen in consultation at the request of Dr. Dietz for evaluation as a potential liver transplant recipient.    Reason for Visit:  Charanjit Ibarra is a 59 year old year old male with Laennec's, who presents for liver transplant evaluation.    HPI:  Doing well  MELD 22  No ascites, no encephalopathy  Last  variceal bleeding 2/2017    Past Medical History:   Diagnosis Date     Alcoholic cirrhosis (H)      Ascites      DJD (degenerative joint disease)      Hypothyroid      Long term (current) use of opiate analgesic      No past surgical history on file.  No past surgical history on file.  Family History   Problem Relation Age of Onset     Diabetes Type 2  Father      Hypothyroidism Sister      Hypothyroidism Brother      Allergies   Allergen Reactions     Aspirin Other (See Comments)     Pt states that NSAIDS severely lowers his platelets     Nsaids Other (See Comments)     Pt states that NSAIDS severely lowers his platelets     Amoxicillin Swelling     Prior to Admission medications    Medication Sig Start Date End Date Taking? Authorizing Provider   artificial saliva (BIOTENE MT) SOLN solution Swish and spit in mouth as needed for dry mouth   Yes Reported, Patient   calcium citrate-vitamin D (CALCIUM CITRATE + D3) 315-250 MG-UNIT TABS per tablet Take 2 tablets by mouth 2 times daily 5/9/18  Yes Angelito Dietz MD   cyclobenzaprine (FLEXERIL) 10 MG tablet Take 1 Tab by mouth 3 times a day as needed for Muscle Spasms. 11/10/17  Yes Reported, Patient   diclofenac (VOLTAREN) 1 % GEL topical gel Place onto the skin daily as needed for moderate pain   Yes Reported, Patient   ferrous sulfate (IRON) 325 (65 FE) MG tablet Take 325 mg by mouth 6/27/16  Yes Reported, Patient   furosemide (LASIX) 20 MG tablet Take 40 mg in a.m. and 20 mg in afternoon 8/30/17  Yes Reported, Patient   lactulose (CHRONULAC) 10 GM/15ML solution Take 30 mL by mouth three times daily 8/30/17  Yes Reported, Patient   levothyroxine (SYNTHROID/LEVOTHROID) 50 MCG tablet Take 50 mcg by mouth 3/27/17  Yes Reported, Patient   Magnesium 65 MG TABS Take 64 mg by mouth 8/30/17  Yes Reported, Patient   modafinil (PROVIGIL) 100 MG tablet Take 100 mg by mouth daily   Yes Reported, Patient   Multiple Vitamins-Minerals (MULTIVITAMIN PO) Take 1 tablet  by mouth daily   Yes Reported, Patient   omeprazole (PRILOSEC OTC) 20 MG tablet Take 20 mg by mouth 2 times daily  8/30/17  Yes Reported, Patient   oxyCODONE IR (ROXICODONE) 15 MG tablet Take 15-30 mg by mouth every 6 hours as needed for moderate to severe pain   Yes Reported, Patient   propranolol (INDERAL) 10 MG tablet Take 10 mg by mouth 3 times daily  10/23/17  Yes Reported, Patient   rifaximin (XIFAXAN) 550 MG TABS tablet Take 550 mg by mouth 2 times daily  11/30/17  Yes Reported, Patient   spironolactone (ALDACTONE) 50 MG tablet Take 25 mg by mouth daily  8/30/17  Yes Reported, Patient       Previous Transplant Hx: No    Cardiovascular Hx:       h/o Cardiac Issues: No       Exercise Tolerance: no chest pain or shortness of breath with exertion.    Potential Donor(s): No    ROS:    REVIEW OF SYSTEMS (check box if normal)  [x]                GENERAL  [x]                  PULMONARY [x]                 GENITOURINARY  [x]                 CNS                 [x]                  CARDIAC  [x]                  ENDOCRINE  [x]                 EARS,NOSE,THROAT [x]                  GASTROINTESTINAL [x]                  NEUROLOGIC    [x]                 MUSCLOSKELTAL  [x]                   HEMATOLOGY    Examination:     Vitals:  /71   Pulse 68   Temp 98.2  F (36.8  C)   Wt 84.7 kg (186 lb 11.2 oz)   SpO2 97%   BMI 28.39 kg/m       GENERAL APPEARANCE: alert and no distress  EYES: PERRL  HENT: mouth without ulcers or lesions  NECK: supple, no adenopathy  RESP: lungs clear to auscultation - no rales, rhonchi or wheezes  CV: regular rhythm, normal rate, no rub   ABDOMEN:  soft, nontender, no HSM or masses and bowel sounds normal  MS: extremities normal- no gross deformities noted, no evidence of inflammation in joints, no muscle tenderness  SKIN: no rash  NEURO: Normal strength and tone, sensory exam grossly normal, mentation intact and speech normal  PSYCH: mentation appears normal. and affect  normal/bright      Results:   Recent Results (from the past 168 hour(s))   INR    Collection Time: 02/05/19  9:42 AM   Result Value Ref Range    INR 1.85 (H) 0.86 - 1.14   Hepatic panel    Collection Time: 02/05/19  9:42 AM   Result Value Ref Range    Bilirubin Direct 1.2 (H) 0.0 - 0.2 mg/dL    Bilirubin Total 5.1 (H) 0.2 - 1.3 mg/dL    Albumin 3.2 (L) 3.4 - 5.0 g/dL    Protein Total 7.3 6.8 - 8.8 g/dL    Alkaline Phosphatase 153 (H) 40 - 150 U/L    ALT 23 0 - 70 U/L    AST 29 0 - 45 U/L   Basic metabolic panel    Collection Time: 02/05/19  9:42 AM   Result Value Ref Range    Sodium 135 133 - 144 mmol/L    Potassium 4.5 3.4 - 5.3 mmol/L    Chloride 103 94 - 109 mmol/L    Carbon Dioxide 26 20 - 32 mmol/L    Anion Gap 6 3 - 14 mmol/L    Glucose 134 (H) 70 - 99 mg/dL    Urea Nitrogen 18 7 - 30 mg/dL    Creatinine 1.08 0.66 - 1.25 mg/dL    GFR Estimate 74 >60 mL/min/[1.73_m2]    GFR Estimate If Black 86 >60 mL/min/[1.73_m2]    Calcium 8.8 8.5 - 10.1 mg/dL   CBC with platelets    Collection Time: 02/05/19  9:42 AM   Result Value Ref Range    WBC 5.3 4.0 - 11.0 10e9/L    RBC Count 4.08 (L) 4.4 - 5.9 10e12/L    Hemoglobin 13.3 13.3 - 17.7 g/dL    Hematocrit 39.2 (L) 40.0 - 53.0 %    MCV 96 78 - 100 fl    MCH 32.6 26.5 - 33.0 pg    MCHC 33.9 31.5 - 36.5 g/dL    RDW 16.0 (H) 10.0 - 15.0 %    Platelet Count 65 (L) 150 - 450 10e9/L     I had a long discussion with the patient regarding liver transplantation which included but was not limited to  the following points:    1. Liver transplant selection committee process.  2. The federal rules for cadaveric waiting list, the size and blood type matching of the organ. The availability of living-related donor transplantation.  3. The types of donors: brain death donors, non-heart beating donors, partial liver grafts: splits and living donor grafts  4. Extended criteria  Donors (older age, steasosis) and the increased  risk of primary non-function using the extended criteria  donors  5. The CDC high risk donors,  Risk of donor transmitted infections and donor transmitted malignancy  6. The liver transplant operation and the associated risks and technical complications which can include intraoperative death, post operative death,  Primary non-function, bleeding requiring re-operations, arterial and biliary complications, bowel perforations, and intra abdominal abscess. Some of these complicaitons may require a second operation.  7. The postoperative course, the ICU stay and risk of postoperative complications which can include sepsis, MI, stroke, brain injury, pneumonia, pleural effusions, and renal dysfunction.  8. The current 1 year and 5 year graft and patient survivals.  9. The need for life long immunosuppressive therapy and the side effects of these medications, including the possibility of toxicity, opportunistic infections, risk of cancer including lymphoma, and the possibility of rejection even if the patient is taking the medication exactly as prescribed.  10. The need for compliance with medications and follow-up visits in the clinic and thereafter.  11. The patient and family understand these risks and wish to proceed to transplantation       I spent 60 minutes with the patient and more than 50% of the time was spend in direct face to face counseling.      Oumuo Pink MD

## 2019-02-08 NOTE — TELEPHONE ENCOUNTER
Spoke with Kylee and Charanjit and will get EGD locally. Will RTC in 3 months for US, Dr. Dietz,  and stress. That will allo time for him to work towards stimulator).

## 2019-02-08 NOTE — TELEPHONE ENCOUNTER
Left patient has message to call and speak with this RN. Plan: continue to work with pain clinic, getting spine stimulator, weaning off narcotics and starting prescribed marijuana.     Per Dietz needs EGD, US and if proceeding with txp will need updated stress.

## 2019-02-08 NOTE — TELEPHONE ENCOUNTER
Patient Call: General  Route to LPN    Reason for call: Patient returning call    Call back needed? Yes    Return Call Needed  Same as documented in contacts section  When to return call?: Same day: Route High Priority

## 2019-02-08 NOTE — TELEPHONE ENCOUNTER
Patient Call: General  Route to LPN    Reason for call: Voicemail: Patient returning call    Call back needed? Yes    Return Call Needed  Same as documented in contacts section  When to return call?: Same day: Route High Priority

## 2019-11-19 NOTE — TELEPHONE ENCOUNTER
Transplant Social Work Services Phone Call      Data: Mr. Ibarra's significant other, Kylee, left me a message after I had left for the day yesterday. There was no answer when I called her back this afternoon.   Intervention: Returned phone call. Chart review.   Assessment: I have met with this couple in the past during his transplant evaluation. He has no scheduled return appointments.   Plan:If I do not hear back, I will attempt to reach Kylee again later this week.     ASHLEY Buckner, [facility]  [phone]

## 2019-11-20 NOTE — TELEPHONE ENCOUNTER
Left message asking for a call back and to inform this RN when he is able to RTC . Also, acknowledged lesion on imaging and plan to get films for team to review. Explained it will be stress test and updated annual with team.     Patients main issue has been chronic pain. When seen by hepatology on 5/2018 he was to consult with the [facility] pain clinic which he deferred.      11/18/19 US 2.3 cm solid-appearing mass in the anterior right hepatic lobe. MRI scheduled for 11/29/19- request for imaging to be pushed and will plan to present at tumor board.    Patient is due for his annual including surgeon,  and stress. He last consulted with neuropscy in 1/2018(, will get updated assessment.

## 2019-11-20 NOTE — TELEPHONE ENCOUNTER
"Transplant Social Work Services Phone Call      Data: I received another call this morning from Mr. Ibarra's significant other, Kylee. The patient was also present in the room. They wanted us to know that he had an ultrasound on Monday in [place], which found a \"lump\" in the middle of his liver. An MRI has been scheduled for the day after Thanksgiving for additional diagnostics. She also informed me that his magnesium was low, so his dose has been increased.   Intervention: Support and information gathering. I also inquired as to why he did not return for follow up, as recommended in the spring.   Assessment: This patient and his significant other wanted to update the liver transplant team here and . Further diagnosis is pending. They did not remember getting calls to schedule follow up appointments here.   Education provided by SW: Who I would share the above information with.   Plan:I will inform  and Rosalba Peres, RN Transplant Coordinator of the above.     ASHLEY Buckner, [facility]  [phone]    "

## 2019-12-06 NOTE — TELEPHONE ENCOUNTER
Spoke to Lisa and discussed consult with IR to discuss treatment options for HCC. Will also complete staging, f/u with Dietz, , surgeon and stress test.     Reviewed with IR on timing.

## 2019-12-06 NOTE — Clinical Note
Please schedule patient for: follow up with Dr. Dietz, txp Surgeon and . He needs labs, stress, chest CT and bone scan. He will have an IR appointment for 1/6/20 at 2 pm, sop please work around that.  ThanksLynn

## 2019-12-06 NOTE — PROGRESS NOTES
Charanjit Ibarra discussed at the multidisciplinary tumor board on 12/06/2019. The attendees may consist of representatives from hepatology, oncology, radiation oncology, surgical subspecialty including liver transplant, pathology and radiology.    MRI 11/29/2019 Imaging:  OPTN 5B 2.1 cm    Plan/Recommendations:  IR consult, possible ablate    RON HackettN, RN  Liver transplant coordinator  [phone] Office

## 2019-12-10 NOTE — TELEPHONE ENCOUNTER
DIAGNOSIS: Consult, Sadi Yeung in IR ablation   DATE RECEIVED: 1.6.20   NOTES STATUS DETAILS   OFFICE NOTE from referring provider Internal 12.6.19 Rosalba Peres RN   OFFICE NOTE from other specialist Internal/CE    OPERATIVE REPORT N/A    MEDICATION LIST Internal/CE    PERTINENT LABS Internal/CE    CTA (CT ANGIOGRAPHY) N/A    CT In Pacs    MRI In Pacs    ULTRASOUND In Pacs

## 2019-12-20 NOTE — TELEPHONE ENCOUNTER
Transplant Social Work Services Phone Call      Data: I received a phone call from 's significant other Chantel. He was also present on speaker. They have not received a schedule for his upcoming appointments, so were asking about this.  also wanted us to know he is no longer using any pain medications, as a reduction in use was requested. However, he does report being in pain all of the time.  Intervention: I informed them of his appointments on January 6, starting with Dr. Dietz at 11:15 AM. Referred them to his coordinator, Rosalba, or Dr. Dietz for answers to their questions relating to his treatment planning and pain management.   Assessment: Chantel continues to actively help manage Charanjit' medical appointments. They plan to stay with his parents in the [facility] when he comes for his January appointments.   Education provided by BARRERA: Scheduled appointments on January 6 found in Epic.   Plan:To let his coordinator know that they have not yet received a schedule, but are planning to come on January 6.     ASHLEY Buckner, [facility]  [phone]

## 2019-12-23 NOTE — TELEPHONE ENCOUNTER
"Return call made to pt's significant other. Kylee reported pt hospitalized in [place] and \"may need surgery.\" Kylee requested that writer contact the hospital in [place].     Contact made [facility], spoke with pt's nurse Miguel. Miguel updated that pt is in transplant evaluation and has upcoming f/u appts 1/6/19. [facility] number provided with recommendation for inpatient team there to call [facility] oncall providers as appropriate regarding plan of care including possible abd surgery. Miguel will update inpatient team.   "

## 2019-12-23 NOTE — TELEPHONE ENCOUNTER
Patient Call: General  Route to LPN    Reason for call: Please connect with pt regarding the pt being in the ER, hes going to be getting his gallbladder taken out     Call back needed? Yes    Return Call Needed  Same as documented in contacts section  When to return call?: Same day: Route High Priority

## 2019-12-24 PROBLEM — K65.2 SPONTANEOUS BACTERIAL PERITONITIS (H): Status: ACTIVE | Noted: 2019-01-01

## 2019-12-24 NOTE — PROGRESS NOTES
[facility]  Transfer Triage Note    Date of call: 12/24/19  Time of call: 10:41 AM    Reason for Transfer:Further diagnostic work up, management, and consultation for specialized care  Diagnosis: Decompensated EtOH Cirrhosis c/b HCC    Outside Records: Available  Additional records requested to be faxed to [phone].    Stability of Patient: Patient is vitally stable, with no critical labs, and will likely remain stable throughout the transfer process    Expected Time of Arrival for Transfer: 0-8 hours    Recommendations for Management and Stabilization: Not needed    Additional Comments   Mr. Ibarra is a 59 year old male with past medical history EtOH Cirrhosis (Dx 2014), has been sober for 5+ years. Recently diagnosed with HCC, though staging/further details unclear at this time. Per chart review appears that pt was discussed at multidisciplinary tumor board on 12/6 with plan for IR consultation, expedited transplant workup. Pt has numerous appointments scheduled for transplant workup in January 2020 (1/6/20). He was admitted to [facility] 12/23 with fevers, acute onset abdominal pain, ascites, fever. Developed leukocytosis to 14.5, Cr from 1.0 -> 1.38, new metabolic acidosis (bicarb 17), and increase of bilirubin from 5 -> 13.7. INR 2.1, Ammonia 59 on admission. RUQ US demonstrated significantly distended gallbladder filled with sludge and stones and gallbladder wall thickening. Also had a diagnostic paracentesis done that is c/w SBP, blood and ascitic fluid cultures pending.     Called by Dr. Kaitlynn Guadalupe from [facility], who was contacted by GI provider at  ([facility]) requesting transfer. Dr. Guadalupe also requesting transfer. Given patient's decompensated liver disease in setting of possible transplant candidacy and HCC, as well a SBP with ?gallbladder pathology, agree with transfer to [facility] for further workup, management, and hepatology consultation. Per  atilio Hoang currently afebrile, hemodynamically stable, A/Ox4, being treated with antibiotics for SBP. Accepted to med surg tele.     Lennie Joseph MD

## 2019-12-24 NOTE — H&P
[facility], [place]    History and Physical - Hospitalist Service, Gold Night       Date of Admission:  12/24/2019    Assessment & Plan   Charanjit Ibarra is a 59 year old male admitted on 12/24/2019. He has a history of cirrhosis secondary to alcohol abuse now sober for 5 years, HCC, DM II and esophageal varices who was admitted to the hospital as a transfer from [facility] with worsening liver failure and spontaneous bacterial peritonitis.    1) Decompensated cirrhosis, AMELIA and spontaneous bacterial peritonitis - Admitted to [facility] on 12/23 with spontaneous bacterial peritonitis and E coli bacteremia.  Put on Invanz and ciprofloxacin due to penicillin allergy.  Does not appear to have received albumin.  Bilirubin has worsened steadily from 7.3 on admission to 14.2 today.  Creatinine also worsening from a baseline of 1 to 1.55 today.  Now transferring here for further treatment as well as evaluation by our GI team for liver transplant.  Of note he does have a history of hepatocellular carcinoma and has follow-up scheduled for January of this year to address this.  - Will hold home diuretics in setting of acute kidney injury  - Continue ciprofloxacin.  Will stop invanz.  - Will start albumin 1.5 g/kg for day one treatment of spontaneous bacterial peritonitis.  Day 3 dose ordered as well.  - Continue home lactulose, rifaximin, propranolol.  - Follow CMP  - Consult gastroenterology  - Will obtain US w/ doppler in the am  - Would follow up with BC results and sensitivities with [facility].  Will need to call facility in the am.    2) History of diabetes - Ppatient very unclear on how much insulin he is actually taking.  Essentially records show he is likely on 15 units of glargine at bedtime and sliding scale insulin.  - Continue glargine 15 units QHS  - Sliding scale insulin    3) History of hypothyroidism  - Continue home levothyroxine 50 mcg Qday       Diet: Regular  DVT Prophylaxis:  "Pneumatic Compression Devices  Keith Catheter: not present  Code Status: Full    Disposition Plan   Expected discharge: 4 - 7 days, recommended to prior living arrangement once SBP resolved, AMELIA resolved and seen by gastroenterology.  Entered: MADDY Link CNP 12/24/2019, 5:59 PM     The patient's care was discussed with the Attending Physician, Dr. Schmidt.    MADDY Link CNP  [facility], [place]  Pager: [phone]  Please see sticky note for cross cover information  ______________________________________________________________________    Chief Complaint   Abdominal pain    History is obtained from the patient    History of Present Illness   Charanjit Ibarra is a 59 year old male admitted on 12/24/2019. He has a history of cirrhosis secondary to alcohol abuse now sober for 5 years, HCC, DM II and esophageal varices who was admitted to the hospital as a transfer from [facility] with worsening liver failure and spontaneous bacterial peritonitis.    The patient is an extremely difficult historian.  When first transferred here he told the nurse that he was hallucinating cobwebs, ants and spiders in his bed, but later told me he only told her this because \"it was fun.\"  He also told me a somewhat unlikely story about being an [employer] and told the nurse that he was a confident of Sid Jones.  He does not know any of his medications.    It is somewhat difficult to obtain a clinical course over the past few days but it appears he presented to an outside emergency department with abdominal pain.  There they did a diagnostic paracentesis which found evidence for spontaneous bacterial peritonitis.  He was started on antibiotics.  Over the course of the past 2 days however his labs have generally worsened and his MELD score is now in the 30s.  He was transferred here for expedited transplant evaluation.    Of note the patient was recently diagnosed with likely " "hepatocellular carcinoma and had planned follow-up with gastroenterology in January of next year to address this.    Review of Systems    Unable to obtain reliable ROS    Past Medical History    I have reviewed this patient's medical history and updated it with pertinent information if needed.   Past Medical History:   Diagnosis Date     Alcoholic cirrhosis (H)      Ascites      DJD (degenerative joint disease)      Hypothyroid      Long term (current) use of opiate analgesic        Past Surgical History   I have reviewed this patient's surgical history and updated it with pertinent information if needed.  Past Surgical History:   Procedure Laterality Date     knee arthoplasty  2014       Social History   I have reviewed this patient's social history and updated it with pertinent information if needed.  Social History     Tobacco Use     Smoking status: Former Smoker     Types: Cigarettes     Start date: 12/19/1977     Smokeless tobacco: Never Used     Tobacco comment: \"smoked for a few months at age 16\"   Substance Use Topics     Alcohol use: No     Comment: Aug. 2016 (pt unsure of date)     Drug use: No       Family History   I have reviewed this patient's family history and updated it with pertinent information if needed.   Family History   Problem Relation Age of Onset     Diabetes Type 2  Father      Hypothyroidism Sister      Hypothyroidism Brother      Prior to Admission Medications   Prior to Admission Medications   Prescriptions Last Dose Informant Patient Reported? Taking?   Magnesium 65 MG TABS   Yes No   Sig: Take 64 mg by mouth   Multiple Vitamins-Minerals (MULTIVITAMIN PO)   Yes No   Sig: Take 1 tablet by mouth daily   artificial saliva (BIOTENE MT) SOLN solution   Yes No   Sig: Swish and spit in mouth as needed for dry mouth   calcium citrate-vitamin D (CALCIUM CITRATE + D3) 315-250 MG-UNIT TABS per tablet   No No   Sig: Take 2 tablets by mouth 2 times daily   cyclobenzaprine (FLEXERIL) 10 MG tablet   " Yes No   Sig: Take 1 Tab by mouth 3 times a day as needed for Muscle Spasms.   diclofenac (VOLTAREN) 1 % GEL topical gel   Yes No   Sig: Place onto the skin daily as needed for moderate pain   ferrous sulfate (IRON) 325 (65 FE) MG tablet   Yes No   Sig: Take 325 mg by mouth   furosemide (LASIX) 20 MG tablet   Yes No   Sig: Take 40 mg in a.m. and 20 mg in afternoon   lactulose (CHRONULAC) 10 GM/15ML solution   Yes No   Sig: Take 30 mL by mouth three times daily   levothyroxine (SYNTHROID/LEVOTHROID) 50 MCG tablet   Yes No   Sig: Take 50 mcg by mouth   modafinil (PROVIGIL) 100 MG tablet   Yes No   Sig: Take 100 mg by mouth daily   omeprazole (PRILOSEC OTC) 20 MG tablet   Yes No   Sig: Take 20 mg by mouth 2 times daily    oxyCODONE IR (ROXICODONE) 15 MG tablet   Yes No   Sig: Take 15-30 mg by mouth every 6 hours as needed for moderate to severe pain   propranolol (INDERAL) 10 MG tablet   Yes No   Sig: Take 10 mg by mouth 3 times daily    rifaximin (XIFAXAN) 550 MG TABS tablet   Yes No   Sig: Take 550 mg by mouth 2 times daily    spironolactone (ALDACTONE) 50 MG tablet   Yes No   Sig: Take 25 mg by mouth daily       Facility-Administered Medications: None     Allergies   Allergies   Allergen Reactions     Aspirin Other (See Comments)     Pt states that NSAIDS severely lowers his platelets     Nsaids Other (See Comments)     Pt states that NSAIDS severely lowers his platelets     Amoxicillin Swelling       Physical Exam   Vital Signs: Temp: 98.4  F (36.9  C) Temp src: Oral BP: 114/69     Resp: 18 SpO2: 94 % O2 Device: None (Room air)      Physical Exam   Constitutional:   Chronically ill appearing middle aged man in no acute distress   Head: Normocephalic and atraumatic.   Eyes: Scleral icterus. Pupils are equal, round, and reactive to light.  Pharynx has no erythema or exudate, mucous membranes are moist  Neck:   No adenopathy, no bony tenderness  Cardiovascular: Regular rate and rhythm.  Faint systolic  murmur  Pulmonary/Chest: Clear to auscultation bilaterally, with no wheezes or retractions. No respiratory distress.  GI: Soft with good bowel sounds.  Non-tender, moderately distended, with no guarding, no rebound, no peritoneal signs.   Musculoskeletal:  No edema or clubbing   Skin: Skin is warm and dry. No rash noted.   Neurological: Alert and oriented to person, place, and time. Nonfocal exam  Psychiatric:  Normal mood and affect.      Data   Data reviewed today: I reviewed all medications, new labs and imaging results over the last 24 hours. I personally reviewed his labs, imaging and notes from the past few days.
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
## 2019-12-25 NOTE — PROGRESS NOTES
SPIRITUAL HEALTH SERVICES  SPIRITUAL ASSESSMENT Progress Note  [facility]A     REFERRAL SOURCE: Pt request at admission    Introduced self and role to Charanjit. Pt shared thoughts on many seemingly unconnected topics. Explored role of fiona in pt's coping briefly. He stated he's had an ability to sense the presence of spiritual things since he was young. Checked out pt's interest in spiritual materials or my reading of scripture, per admission note that scripture is important. However, pt did not respond directly. Visit ended when pt requested to use bathroom.    PLAN: Unit  will be notified of care provided.    Kaitlin Ji  Chaplain Resident  Pager [phone]    [facility] remains available 24/7 for emergent requests/referrals, either by having the switchboard page the on-call  or by entering an ASAP/STAT consult in Epic (this will also page the on-call ).

## 2019-12-25 NOTE — PLAN OF CARE
/72 (BP Location: Left arm)   Pulse 87   Temp 98.2  F (36.8  C) (Oral)   Resp 18   SpO2 96%      9771-1480: A&Ox4. Tangential speech. Impulsive on BA. AVSS on RA. Reports AMBROSE d/t abdominal fullness. Patient reporting abdominal, back, and sacral pain, heating pad applied and PRN flexeril with fair control. PRN oxy x1. Patient denies nausea. Voiding, not measured. BM x2, however, patient reports feeling constiptaed. PRN lactulose per [place] protocol (Stage I). Regular diet with poor appetite d/t abdominal fullness. R PIV SL. Up SBA. BA for safety. Will continue with POC, notify team with changes/concerns.

## 2019-12-25 NOTE — CONSULTS
Procedure Note    Attending: Jared Langford MD  Resident: N/A  Procedure: Therapeutic paracentesis  Indication: r/o SBP  Pre-procedure diagnosis: Cirrhosis   Post-procedure diagnosis: Cirrhosis     The risks and benefits of the procedure were explained to patient who expressed understanding and opted to proceed.  Consent was obtained and placed in the chart.  A time out was performed.  An area of ascites was located and marked using ultrasound guidance in the LLQ lower quadrant; the area was prepped and draped in the usual sterile fashion.  ~5 ml of 1% lidocaine was instilled and ascites located.  . The 25 g needle was inserted under direct US guidance. A total of 8 ml of orange colored fluid removed.   A specimen was sent for analysis. The needle as withdrawn and the area dressed.  In addition it was noted that the bowels were thick and dilated , suggest obtaining imaging KUB vs CT abdomen to further evaluate.    Patient tolerated the procedure well with no immediate complications.  The primary team was informed of the procedure and recommendations.     Tahira Langford MD  [facility]   Internal Medicine   [phone]  DOS:  December 25, 2019

## 2019-12-25 NOTE — PROGRESS NOTES
[facility], [facility] Progress Note - Hospitalist Service, Gold 4       Date of Admission:  12/24/2019  Assessment & Plan   Charanjit Ibarra is a 59 year old male admitted on 12/24/2019. He has a history of cirrhosis secondary to alcohol abuse now sober for 5 years, HCC, DM II and esophageal varices who was admitted to the hospital as a transfer from [facility] with worsening liver failure and spontaneous bacterial peritonitis.     # Decompensated cirrhosis, AMELIA and spontaneous bacterial peritonitis: Admitted to [facility] on 12/23 with spontaneous bacterial peritonitis and E coli bacteremia.  Put on Invanz and ciprofloxacin due to penicillin allergy. D/w [facility] microbiology lab today and they confirm E coli susceptible to Cipro. Does not appear to have received albumin.  Bilirubin has worsened steadily from 7.3 on admission to 14.2 yesterday.  Creatinine also worsening from a baseline of 1 to 1.55 yesterday, so transferred to [facility] for further treatment as well as evaluation by our GI team for liver transplant.  Of note he does have a history of hepatocellular carcinoma and has follow-up scheduled for January of this year to address this. RUQ US w/ doppler this am with patent doppler evaluation. Total bili this am 13.9 and Cr improved to 1.13 within context of starting albumin challenge yesterday. Pt in quite a bit of discomfort due to abd distension.  MELD-Na score: 30 at 12/25/2019  5:18 AM  MELD score: 27 at 12/25/2019  5:18 AM  Calculated from:  Serum Creatinine: 1.13 mg/dL at 12/25/2019  5:18 AM  Serum Sodium: 130 mmol/L at 12/25/2019  5:18 AM  Total Bilirubin: 13.9 mg/dL at 12/25/2019  5:18 AM  INR(ratio): 2.33 at 12/25/2019  5:18 AM  Age: 59 years   - GI consulted and appreciate recommendations.   - Continue one time daily 2/3 100 g IV albumin  - Continue Cipro 400 mg q12hrs   - Start Vit K 5 mg IV daily x 3 days  - Consult CAPS to do diagnostic and therapeutic  paracentesis.   - Continue to hold PTA diuretics in setting of acute kidney injury, as well as PTA propranolol given SBP  - Continue home lactulose and rifaximin. Add RN driven lactulose protocol.  - Daily Meld labs  - Obtain routine AFP level  - Consult nutrition to optimize nutritional status. In interim start low Na and high protein diet     # T2DM: Patient very unclear on how much insulin he is actually taking.  Essentially records show he is likely on 15 units of glargine at bedtime and sliding scale insulin. A1C this am 6.8%. BGs stable since transfer but persistently >200.  Recent Labs   Lab 12/25/19  1114 12/25/19  0819 12/25/19  0518 12/25/19  0134 12/24/19  2155   GLC  --   --  220*  --   --    * 215*  --  233* 266*      - Increase glargine to 20 units QHS  - Continue MSSI  - Accuchecks TID before meals, at bedtime and 0200  - Hypoglycemic protocol    # Anemia, thrombocytopenia: Hgb on 12/22 WNL. Hgb this am 9.5 but no e/o bleeding. Plt count appears chronically low with plt count this am at 70 likely at BL.  - Daily CBC     # Hypothyroidism: Continue home levothyroxine 50 mcg Qday      Diet: Combination Diet Regular Diet Adult    DVT Prophylaxis: Pneumatic Compression Devices  Keith Catheter: not present  Code Status: Full Code      Disposition Plan   Expected discharge: 3-4 days, recommended to prior living arrangement vs TCU once antibiotic plan established, mental status at baseline, safe disposition plan/ TCU bed available, SIRS/Sepsis treated and GI workup complete.  Entered: Prasanna Baeza PA-C 12/25/2019, 8:32 AM       The patient's care was discussed with the Attending Physician, Dr. Michele, Bedside Nurse and Patient.    Prasanna Baeza PA-C  Hospitalist Service [address], [place]  Pager: [phone]  Please see sticky note for cross cover information  ______________________________________________________________________    Interval History   C/o  worsening abd pain and low back pain. Also having neck pain. Denies fever, chills, chest pain, SOB bowel and bladder concerns.     Data reviewed today: I reviewed all medications, new labs and imaging results over the last 24 hours.    Physical Exam   Vital Signs: Temp: 99  F (37.2  C) Temp src: Oral BP: 116/88 Pulse: 80   Resp: 18 SpO2: 95 % O2 Device: None (Room air)    Weight: 0 lbs 0 oz  GEN: In NAD  HEENT: NCAT; PERRL; sclerae non-icteric  LUNGS: CTAB  CV: RRR  ABD: +BSs; moderately distended and tender  EXT: Tracce BLE edema  SKIN: Diffuse jaundice. No acute rashes noted on exposed areas.  NEURO: AAOx3; CNs grossly intact; No asterixis; No acute focal deficits noted.      Data   CMP  Recent Labs   Lab 12/25/19 0518   *   POTASSIUM 4.7   CHLORIDE 100   CO2 20   ANIONGAP 10   *   BUN 38*   CR 1.13   GFRESTIMATED 70   GFRESTBLACK 81   TRA 8.7   PROTTOTAL 6.4*   ALBUMIN 3.7   BILITOTAL 13.9*   ALKPHOS 60   AST 47*   ALT 21     CBC  Recent Labs   Lab 12/25/19 0518   WBC 10.7   RBC 2.81*   HGB 9.5*   HCT 27.5*   MCV 98   MCH 33.8*   MCHC 34.5   RDW 16.0*   PLT 70*     INR  Recent Labs   Lab 12/25/19 0518   INR 2.33*       ?

## 2019-12-25 NOTE — PLAN OF CARE
[id number] VS are stable. Patient denies pain. Patient denies nausea. Pt continues to have confusion and switches subjects frequently. IV access - Pt has two right PIV, one PIV TKO NS and other infusing albumin. Urine Output - Pt is up with assist of 1 . Bowel Function - Pt reported small BM on evening shift 12/24/19. Nutrition - Pt is on a regular diet. Activity - Pt is up with assist of one. Will continue to monitor and notify team with concerns.

## 2019-12-26 NOTE — CONSULTS
Cardiology Consult         Date of Service (when I saw the patient): 12/27/2019     ASSESSMENT:   60 yo male with a history of alcoholic cirrhosis, esophageal varices, concern for HCC with liver lesion, admitted for cholecystitis and SBP. We are consulted for pre-op cardiac evaluation prior to liver transplantation. He had a cardiac workup just under a year ago and was cleared for surgery, and he does not have a cardiac history. At this point, we feel he is still low risk for christine-operative complications from a cardiac standpoint, though would recommend repeating a Dobutamine stress test for complete evaluation.     RECOMMENDATIONS:    - Recommend repeating dobutamine stress test  - If stress test stable, then pt is low risk for christine-operative complications from a cardiac standpoint      Thank you for allowing me to care for this patient. This has been discussed with the attending physician.    Adelso Carpenter MD  [facility]  PGY2, [phone]      ------------------------------------------------------------------------------------    REASON FOR CONSULT: Cardiac clearance prior to liver transplant    History of Present Illness   Charanjit Ibarra is a 59 year old male with a history of cirrhosis 2/2 alcohol use, now sober for 5 years, esophageal varices, concern for HCC with liver lesion who is admitted with cholecystitis and SBP. He has been on the liver transplant list and is an overall decent candidate, per transplant surgery. He was evaluated from a Cardiac standpoint in January of 2018 by Dr. Batres in preparation for surgery and had a dobutamine stress test that was negative for ischemia and pulmonary hypertension with a normal EF. He does not have a known cardiac history and Dr. Batres felt he was at low risk for christine-operative cardiovascular complications and did not need further testing.     Charanjit denies any chest pain or chest pressure at rest or with exertion, shortness of  breath, palpitations. He believes he has had some cardiac testing done since the last dobutamine stress test, but there are no records in our system or in care everywhere. It does appear that another dobutamine stress test was ordered, but this was never completed.    Past Medical History    I have reviewed this patient's medical history and updated it with pertinent information if needed.   Past Medical History:   Diagnosis Date     Alcoholic cirrhosis (H)      Ascites      DJD (degenerative joint disease)      Hypothyroid      Long term (current) use of opiate analgesic        Past Surgical History   I have reviewed this patient's surgical history and updated it with pertinent information if needed.  Past Surgical History:   Procedure Laterality Date     knee arthoplasty  2014       Prior to Admission Medications   Prior to Admission Medications   Prescriptions Last Dose Informant Patient Reported? Taking?   Magnesium 65 MG TABS   Yes No   Sig: Take 64 mg by mouth   Multiple Vitamins-Minerals (MULTIVITAMIN PO)   Yes No   Sig: Take 1 tablet by mouth daily   artificial saliva (BIOTENE MT) SOLN solution   Yes No   Sig: Swish and spit in mouth as needed for dry mouth   calcium citrate-vitamin D (CALCIUM CITRATE + D3) 315-250 MG-UNIT TABS per tablet   No No   Sig: Take 2 tablets by mouth 2 times daily   cyclobenzaprine (FLEXERIL) 10 MG tablet   Yes No   Sig: Take 1 Tab by mouth 3 times a day as needed for Muscle Spasms.   diclofenac (VOLTAREN) 1 % GEL topical gel   Yes No   Sig: Place onto the skin daily as needed for moderate pain   ferrous sulfate (IRON) 325 (65 FE) MG tablet   Yes No   Sig: Take 325 mg by mouth   furosemide (LASIX) 20 MG tablet   Yes No   Sig: Take 40 mg in a.m. and 20 mg in afternoon   insulin glargine (LANTUS VIAL) 100 UNIT/ML vial   Yes Yes   Sig: Inject 15 Units Subcutaneous At Bedtime   lactulose (CHRONULAC) 10 GM/15ML solution   Yes No   Sig: Take 30 mL by mouth three times daily    levothyroxine (SYNTHROID/LEVOTHROID) 50 MCG tablet   Yes No   Sig: Take 50 mcg by mouth   modafinil (PROVIGIL) 100 MG tablet   Yes No   Sig: Take 100 mg by mouth daily   omeprazole (PRILOSEC OTC) 20 MG tablet   Yes No   Sig: Take 20 mg by mouth 2 times daily    oxyCODONE IR (ROXICODONE) 15 MG tablet   Yes No   Sig: Take 15-30 mg by mouth every 6 hours as needed for moderate to severe pain   propranolol (INDERAL) 10 MG tablet   Yes No   Sig: Take 10 mg by mouth 3 times daily    rifaximin (XIFAXAN) 550 MG TABS tablet   Yes No   Sig: Take 550 mg by mouth 2 times daily    spironolactone (ALDACTONE) 50 MG tablet   Yes No   Sig: Take 25 mg by mouth daily       Facility-Administered Medications: None     Allergies   Allergies   Allergen Reactions     Amoxicillin Anaphylaxis     Aspirin Other (See Comments)     Pt states that NSAIDS severely lowers his platelets     Nsaids Other (See Comments)     Pt states that NSAIDS severely lowers his platelets       Social History   I have reviewed this patient's social history and updated it with pertinent information if needed. Charanjit Trevon Ibarra  reports that he has quit smoking. His smoking use included cigarettes. He started smoking about 42 years ago. He has never used smokeless tobacco. He reports that he does not drink alcohol or use drugs.    Family History   I have reviewed this patient's family history and updated it with pertinent information if needed.   Family History   Problem Relation Age of Onset     Diabetes Type 2  Father      Hypothyroidism Sister      Hypothyroidism Brother        Review of Systems   The 10 point Review of Systems is negative other than noted in the HPI or here.     Physical Exam   Temp: 98.2  F (36.8  C) Temp src: Oral BP: (!) 143/79 Pulse: 109   Resp: 18 SpO2: 90 % O2 Device: None (Room air)    Vital Signs with Ranges  Temp:  [97.8  F (36.6  C)-98.9  F (37.2  C)] 98.2  F (36.8  C)  Pulse:  [] 109  Resp:  [14-18] 18  BP: (105-143)/(62-84)  143/79  SpO2:  [90 %-94 %] 90 %  0 lbs 0 oz    GEN: NAD, pleasant  HEENT: scleral icterus  CV: RRR, normal s1/s2, no murmurs/rubs/s3/s4, no heave.   CHEST: CTAB  ABDOMEN: Distended, tender to palpation  NEURO: AA&Ox3, fluent/appropriate, motor grossly nonfocal  PSYCH: cooperative, affect appropriate    Data   Data reviewed today:  I personally reviewed no images or EKG's today.  Recent Labs   Lab 12/27/19  0613 12/26/19  0630 12/25/19  0518   WBC 26.9* 16.4* 10.7   HGB 11.4* 10.4* 9.5*   MCV 99 101* 98   * 52* 70*   INR 2.22* 2.21* 2.33*   * 131* 130*   POTASSIUM 3.9 4.9 4.7   CHLORIDE 100 102 100   CO2 19* 18* 20   BUN 44* 36* 38*   CR 1.21 1.11 1.13   ANIONGAP 10 11 10   TRA 10.1 9.4 8.7   * 224* 220*   ALBUMIN 4.3 3.7 3.7   PROTTOTAL 6.5* 6.1* 6.4*   BILITOTAL 16.4* 14.6* 13.9*   ALKPHOS 72 64 60   ALT 23 24 21   AST 32 61* 47*       Recent Results (from the past 24 hour(s))   NM HepatOBiliary Scan    Narrative    Examination:  NM HEPATOBILIARY SCAN      Date: 12/26/2019 4:49 PM.    Indication:   Right upper quadrant pain, suspected cholecystitis,  underlying cirrhosis of the liver.     Additional Information: none    Technique:    The patient received 6 mCi of Tc-99m Choletec intravenously. Images  were obtained out through 60 minutes.   At 60 minutes the patient  received 2 mg of morphine intravenously. An additional 45 minutes of  images were obtained looking for gallbladder filling.    Findings:    There is prolonged clearing of the radionuclide from the blood pool  into the liver. There is enough extraction by the liver that there is  passage of the radionuclide into the biliary system and small bowel.  No gallbladder is visualized during the first 60 minutes. The patient  received 2 mg of morphine intravenously trying to stimulate sphincter  of Andrés contraction to fill the gallbladder passively. An additional  45 minutes of images was obtained and there is no visualization of  the  gallbladder.    Enterogastric reflux was not present.      Impression    Impression:    1. Nonvisualization gallbladder is consistent in the proper clinical  setting with cholecystitis.  2. Underlying hepatocellular dysfunction with reduction of clearance  of the radionuclide from the blood pool.      =======================    The normal Gall Bladder ejection fraction for a 45 minute infusion is  >40%    CAPRICE GUARDADO MD       I very much appreciated the opportunity to see and assess Mr Charanjit Ibarra in the hospital with CV  Resident Dr Tan. Patient was previously cleared for liver surg by Dr Villaseñor last year. We will update recommendation as requested by surg team/  Poss recommend repeat nuc stress if not done recently at his home hospityal>>>we will check  Everywhere.    I agree with the note above which summarizes my findings and current recommendations.  Please do not hesitate to contact my office if you have any questions or concerns.      Mitch Flores MD  Cardiac Arrhythmia Service  [facility]  [phone]

## 2019-12-26 NOTE — PLAN OF CARE
"6080-5256 VS are stable. Patient complains of pain localized around lower back pt was given PRN oxycodone 5mg. Pt stated \"I just need someone to push on my back to help it go back in place\". Patient denies nausea. IV access - Pt has 2 right PIVs saline locked. Urine Output - Pt is up to toilet and not saving. Bowel Function - Pt had one loose BM on shift 12/26/19. Nutrition - Pt is on a combination diet with high protein. Activity - Pt is up SBA. Will continue to monitor and notify team with concerns.  "

## 2019-12-26 NOTE — CONSULTS
Transplant Social Work Services Progress Note      Date of Initial Social Work Evaluation: 12/19/2017  Collaborated with: Chart review; Rosalba Peres RN Pre-transplant coordinator    Data: This is to acknowledge the social work consult for a pre-transplant psychosocial assessment. He was previously seen as part of a transplant evaluation two years ago.   Intervention: I attempted to see Mr. Ibarra this afternoon, but he was not in his room.   Assessment: I was unable to complete an updated psychosocial assessment today, but will attempt to do so again tomorrow. I am in agreement with neuropsychological testing. This can be compared to his testing by Celena Frey, PhD in January 2018. I would also recommend a pain consultation, as his ongoing chronic pain is debilitating for him.   Plan:    Discharge Plans in Progress: Unknown.     Barriers to d/c plan: Medical.     Follow up Plan: I will attempt to see him tomorrow for psychosocial assessment. ASHLEY Manuel or the care coordinator, Carly Unger RN will likely follow for disposition needs.     ASHLEY Buckner, [facility]  Pager [phone]

## 2019-12-26 NOTE — PLAN OF CARE
/68 (BP Location: Left arm)   Pulse 93   Temp 98  F (36.7  C) (Oral)   Resp 16   SpO2 93%      0700 - 1530  VS stable. Pt on room air. Patient has continuous pain. Patient denies nausea. BG - ACHS. Blood glucose 227 and 200. Urine Output - Pt voiding adequately. Not saving. Bowel Function - Pt passing loose stool. Last BM on 12/26/19. Nutrition - High kcal/ high protein, 2g NA diet. NPO for hepatobiliary scan. PIV - right arm; saline locked. Activity - Up with assistance. Bed alarm on. Education - Pt verbalizes understanding of hepatobiliary scan and need to remain NPO until after scan. Plan of Care - Will continue with plan of care and notify care team of any changes.

## 2019-12-26 NOTE — CONSULTS
Assessment and Plan:  1. liver transplant evaluation - patient is a decent candidate overall. Benefits and surgical risks of a liver transplantation were discussed.  2. End stage liver disease due to cirrohsis of the liver due to alcohol and a liver lesion concerning for HCC.    Surgical evaluation:  1. Portal Vein: right portal vein obstructed on CT scan on 12/25/19  2. Hepatic Artery: open  3. TIPS: absent  4. Previous Abdominal Surgery: umbilical hernia repair  5. Hepatocellular Carcinoma: concern for right hepatic lesion on MRI on 12/04/19, AFP on 12/26/19 was less than 1.5  6. Ascites: present  7. Costal Angle: wide  8. Portopulmonary Hypertension: absent on 12/17 echo  9. Hepatopulmonary Syndrome: absent  10. Cardiac Evaluation: needs to be completed  11. Nutritional Status: fair    Recommendations:   -please obtain a HIDA scan to evaluate the patient for cholecystitis  -if the HIDA is positive, please consult the luminal GI team for stent placement  -Please consult social work to establish sobriety plan post transplant  -please consult cardiology for pre-transplant optimization   -continue to treat SBP  -the patient will need chest CT scan to complete HCC staging  -the patient should not be listed until the aforementioned have been addressed       Patients overall evaluation will be discussed at the Liver Transplant selection committee meeting with a final recommendation on the patients suitability for transplant to be made at that time.    Consult Full Details:  Mr. Ibarra was seen in consultation at the request of Dr. Guadalupe for evaluation as a potential liver transplant recipient.      HPI:  The patient is a a 59 year old gentleman with a history of cirrhosis secondary to alcohol. He recently developed a liver lesion concerning for HCC. His liver failure has been complicated by esophageal varices (s/p banding), ascites (currently has SBP) and concern for HCC. The patient is a poor historian and he  lives alone. Per Chart review, he has been having abdominal pain for a few days. He presented to a local emergency department and was evaluated. He was found to have a distended gallbladder concerning for cholecystitis and ascites with SBP. He was initiated on broad spectrum antibiotics and transferred to our institution for further care. The patient had some episodes of confusion since admission (encephalopathy vs sepsis?). He underwent paracentesis that revealed persistent SBP.     Previous Transplant Hx: no    Cardiovascular Hx:  h/o Cardiac Issues: unknown  Exercise Tolerance: unknown    Potential Donor(s): unknown    Examination:     Vitals:  There were no vitals taken for this visit.    GENERAL APPEARANCE: alert and jaundiced  EYES: PERRL  HENT: mouth without ulcers or lesions  NECK: supple, no adenopathy  RESP: lungs clear to auscultation - no rales, rhonchi or wheezes  CV: regular rhythm, normal rate, no rub   ABDOMEN: RUQ tenderness, distended, soft  MS: extremities normal- no gross deformities noted, no evidence of inflammation in joints, no muscle tenderness  SKIN: no rash  NEURO: Normal strength and tone, sensory exam grossly normal, mentation intact and speech normal  PSYCH: mentation appears normal. and affect normal/bright    Patient seen and discussed with Dr. Pink who agrees with the above    Gonzalo Mccall MD  Transplant Surgery Fellow
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
## 2019-12-26 NOTE — PROGRESS NOTES
[facility], [facility] Progress Note - Hospitalist Service, Gold 4       Date of Admission:  12/24/2019  Assessment & Plan   Charanjit Ibarra is a 59 year old male admitted on 12/24/2019. He has a history of cirrhosis secondary to alcohol abuse now sober for 5 years, HCC, DM II and esophageal varices who was admitted to the hospital as a transfer from [facility] with worsening liver failure and spontaneous bacterial peritonitis.     # Decompensated cirrhosis, AMELIA and spontaneous bacterial peritonitis: Admitted to  on 12/23 with spontaneous bacterial peritonitis and E coli bacteremia.  Put on Invanz and ciprofloxacin due to penicillin allergy. E coli bacteremia sensitive to Cipro from OSH. Due to bilirubin steadily worsening and increase in creatinine, patient was transferred to [facility] for further treatment as well as evaluation by GI for liver transplant. Of note he does have a history of hepatocellular carcinoma and has follow-up scheduled for January to address this. RUQ US w/ doppler 12/25 with patent doppler evaluation. On admission to [facility], patient started on albumin challenge, repeat paracentesis with 14K WBC and gram stain showing GNR therefor antibiotic was transitioned to ertapenem.Tbli continues to increase to 14.6 today. Creatinine stable at 1.1. CT abdomen revealed protal vein thrombosis and possible cholecystitis. AFP level <1.5. Pt in quite a bit of discomfort due to abd distension.  MELD-Na score: 29 at 12/26/2019  6:30 AM  MELD score: 26 at 12/26/2019  6:30 AM  Calculated from:  Serum Creatinine: 1.11 mg/dL at 12/26/2019  6:30 AM  Serum Sodium: 131 mmol/L at 12/26/2019  6:30 AM  Total Bilirubin: 14.6 mg/dL at 12/26/2019  6:30 AM  INR(ratio): 2.21 at 12/26/2019  6:30 AM  Age: 59 years  - GI consulted and appreciate recommendations.   - Continue one time daily 3/3 100 g IV albumin   - Continue ertapenem   - Continue Vit K 5 mg IV daily x 2/3  days  - Continue to hold PTA diuretics in setting of acute kidney injury, as well as PTA propranolol given SBP  - Continue home lactulose and rifaximin with RN driven lactulose protocol.  - Daily Meld labs  - Consult nutrition to optimize nutritional status  - Cardiology consult for pre-transplant optimization   - Social work consult to establish sobriety plan post transplant   - Will need CT chest to complete HCC staging    # Dilated gallbladder with concern for cholecystitis   CT abdomen revealed distended gallbladder measuring up to 12.2 cm in length with possible gallbladder wall thickening. Unable to determine if this is secondary to large volume ascites. Unable to rule out cholecystitis. Patient with positive meraz's sign on exam with generalized abdominal pain into his back. Peritoneal fluid with gram negative rods on gram stain. Hemodynamically stable. Currently on ertapenum as above.   - Transplant surgery consulted  - HIDA scan to evaluate for cholecystitis, if HIDA positive will consult luminal GI team for stent placement   - Continue antibiotics as above     # Portal vein thrombosis   Noted on CT abdomen 12/25. Portal venous hypertension with splenomegaly and splenic varices with branch portal vein thrombus in the posterior right portal vein.    - Discussed with GI, appreciate recommendations on need for AC      # T2DM: Patient very unclear on how much insulin he is actually taking PTA.  Essentially records show he is likely on 15 units of glargine at bedtime and sliding scale insulin. A1C this am 6.8%. BGs stable since transfer but persistently >200.  - Continue glargine 20 units QHS  - Increase to HSSI  - Accuchecks TID before meals, at bedtime and 0200  - Hypoglycemic protocol     # Anemia, thrombocytopenia: Appears baseline hgb around 10. Hgb stable at baseline. Plt count appears chronically low with plt count this am at 70 likely at BL.  - Daily CBC     # Hypothyroidism: Continue home levothyroxine  50 mcg Qday    Diet: Combination Diet High Kcal/High Protein Diet, ADULT; 2 gm NA Diet    DVT Prophylaxis: Pneumatic Compression Devices  Keith Catheter: not present  Code Status: Full Code      Disposition Plan   Expected discharge: 4 - 7 days, recommended to prior living arrangement vs TCUonce adequate pain management/ tolerating PO medications, safe disposition plan/ TCU bed available, SIRS/Sepsis treated and GI workup complete.  Entered: EDNA Bryson 12/26/2019, 9:56 AM       The patient's care was discussed with the Attending Physician, Dr. Michele, Bedside Nurse, Patient and GI, transplant surgery Consultant.    EDNA Bryson  Hospitalist Service, [address], [place]  Pager: [phone]  Please see sticky note for cross cover information  ______________________________________________________________________    Interval History   Repeat para completed yesterday, growing GNR, abx transitioned to ertapenum. Patient reports persistent abdominal pain that radiates to his back. Last BM this AM.     CT abdomen concerning for possible cholecystitis and poral vein thrombosis.     Denies fevers, chills, nausea, vomiting, chest pain, SOB, dyspnea,     Data reviewed today: I reviewed all medications, new labs and imaging results over the last 24 hours.     Physical Exam   Vital Signs: Temp: 98.5  F (36.9  C) Temp src: Oral BP: 126/71 Pulse: 85   Resp: 16 SpO2: 93 % O2 Device: None (Room air)    Weight: 0 lbs 0 oz  GENERAL: Alert and oriented x 3. NAD.   HEENT: Anicteric sclera. PERRL. Mucous membranes moist and without lesions.   CV: RRR. S1, S2. No murmurs appreciated.   RESPIRATORY: Effort normal on RA. Lungs CTAB with no wheezing, rales, rhonchi.   GI: Abdomen distended with tenderness to palpation globally. Mancera's sign positive. Bowel sounds present. No rebound orguarding.   MUSCULOSKELETAL: No joint swelling or tenderness. Moves all extremities.   NEUROLOGICAL: No  focal deficits.   EXTREMITIES: Trace bilateral peripheral edema. Intact bilateral pedal pulses.   SKIN: No jaundice. No rashes.     Data   Recent Labs   Lab 12/26/19  0630 12/25/19  0518   WBC 16.4* 10.7   HGB 10.4* 9.5*   * 98   PLT 52* 70*   INR 2.21* 2.33*   * 130*   POTASSIUM 4.9 4.7   CHLORIDE 102 100   CO2 18* 20   BUN 36* 38*   CR 1.11 1.13   ANIONGAP 11 10   TRA 9.4 8.7   * 220*   ALBUMIN 3.7 3.7   PROTTOTAL 6.1* 6.4*   BILITOTAL 14.6* 13.9*   ALKPHOS 64 60   ALT 24 21   AST 61* 47*     Recent Results (from the past 24 hour(s))   POC US Guide for Paracentesis    Impression    The procedure was completed with no immediate complications. Refer to consult note for details.  Tahira Langford MD   [facility] service      CT Abdomen Pelvis w Contrast   Result Value    Radiologist flags Portal venous thrombus (Urgent)    Narrative    EXAMINATION: CT ABDOMEN PELVIS W CONTRAST  12/25/2019 11:17 PM      CLINICAL HISTORY: Abdominal rigidity    COMPARISON: CT 7/24/2017, 6/30/2017, 3/29/2017, abdominal MRI  11/29/2019. Care everywhere reads from [facility] on 12/23/2019    PROCEDURE COMMENTS: CT of the abdomen was performed with intravenous  contrast. Coronal and sagittal reformatted images were obtained.    FINDINGS:  Lower thorax:   Bibasilar areas of consolidation with in the bilateral lower lobes.  Likely atelectasis although unable to rule out infection. Heart size  is not enlarged. Coronary artery calcifications. Slight thickening of  the distal esophagus.     Abdomen and pelvis:  Liver is shrunken and macronodular, similar to MRI 11/29/2018.  Subcapsular cyst in the posterior right hepatic lobe that measures up  to 2.5 cm unchanged from prior MRI. Area seen in segment 5-6 on MRI  11/29/2019 is not well identified on this single phase exam, though  subtle washout suggested in this region.     Gallbladder is distended measuring up to 12.2 cm in length.  Gallbladder wall is possibly  thickened. No radiopaque gallstones are  present.    The main portal vein is patent, though there is newly identified  nonopacification of posterior branch of the right portal vein  supplying segments 6 and 7. Hepatic artery in this region is widely  patent.    Atrophic pancreas. Splenomegaly similar prior exams. Prominent splenic  vein varices. There are also varicosities in the anterior abdominal  wall. The adrenals are unremarkable. Subcentimeter hypoattenuating  renal lesions, likely represent simple renal cysts.    Large amount of ascites in all 4 quadrants , all of which is largely  simple fluid attenuating. Fluid extends into the right inguinal canal.  In the pelvis is visualized on series 3, image 375 there is some  enhancing wall around a simple fluid collection suggesting a loculated  component of this ascitic collection.     Several prominent loops of bowel with thickened walls, likely be  reactive to the ascites. No dilated loops of bowel are present. Large  amount of fluid within the colon. There is an umbilical hernia with  ascitic extension of fluid into the anterior abdominal wall.    No free air. There are no abnormally sized lymph nodes.    Mild calcific atherosclerotic disease.    Osseous structures:   Degenerative changes of the spine. Left femoral neck fixation.  Varicosities in the anterior abdominal wall.      Impression    IMPRESSION:  1. Changes from chronic liver disease with cirrhotic and macronodular  liver. Portal venous hypertension with splenomegaly and splenic  varices. Branch portal vein thrombus in the posterior right portal  vein. There are also varices in the anterior abdominal wall.  2. Large volume simple ascites in all 4 quadrants. In the pelvis there  is a small amount of enhancement about the simple appearing fluid  indicating some loculated component. Findings consistent with recent  ascites fluid analysis suggesting SBP.  3. Distended gallbladder measuring up to 12.2 cm in  length. Possible  gallbladder wall thickening although this may be secondary to large  volume ascites. Ultrasound and CT performed at outside hospital  12/23/2019 also showed distended gallbladder with sludge and stones.  Unable to rule out acute cholecystitis.  4. Some loops of bowel within the lower anterior abdomen demonstrate a  slightly thickened appearance which may be reactive to the ascites. No  evidence for bowel obstruction  5. Anterior abdominal wall hernia with ascitic fluid in the anterior  abdominal wall. There is also a right inguinal canal hernia with  fluid.        [Urgent Result: Portal venous thrombus]    Finding was identified on 12/25/2019 11:24 PM.     Dr. German from [facility] was contacted by Dr. Real Villafana at  12/26/2019 7:14 AM and verbalized understanding of the urgent finding.      I have personally reviewed the examination and initial interpretation  and I agree with the findings.    ANGELY GUILLEN MD     Medications     - MEDICATION INSTRUCTIONS -         calcium citrate-vitamin D  2 tablet Oral BID     ertapenem (INVanz) IV  1 g Intravenous Q24H     ferrous sulfate  325 mg Oral BID     insulin aspart  1-10 Units Subcutaneous TID AC     insulin aspart  1-7 Units Subcutaneous At Bedtime     insulin glargine  20 Units Subcutaneous At Bedtime     lactulose  20 g Oral TID     levothyroxine  50 mcg Oral Daily     magnesium chloride  535 mg Oral Daily     modafinil  100 mg Oral Daily     pantoprazole  40 mg Oral BID AC     phytonadione  10 mg Intravenous Daily     rifaximin  550 mg Oral BID     sodium chloride (PF)  3 mL Intracatheter Q8H

## 2019-12-26 NOTE — TELEPHONE ENCOUNTER
Patient Call:  Left on voice message 12/24/2019 at 12:04PM  Kristen left voice message:  Please call her to let her know when Charanjit got here ([employer] [facility])         Call back needed? Yes    Return Call Needed  Same as documented in contacts section  When to return call?: Same day: [facility]

## 2019-12-26 NOTE — PROGRESS NOTES
MD cross cover note   Ascites fluid analysis is showing high cell count with 84% neutrophils  gram-negative rods on Gram stain. he was transferred from outside hospital for SBP and  transplant work-up.  Will broaden antibiotics from Cipro to ertapenem.  Will check stat CT abdomen pelvis with contrasts  to evaluate for secondary cause of peritonitis  -follow ct scan result   -add on ascites  fluid total bilirubin, protein, LDH and glucose   consider surgical consult after CT result   Mark Johnson MD  Hospitalist/nocturnist  [facility]    Departments of Medicine   Pager: [phone]
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
## 2019-12-26 NOTE — PROGRESS NOTES
"CLINICAL NUTRITION SERVICES - ASSESSMENT NOTE     Nutrition Prescription    RECOMMENDATIONS FOR MDs/PROVIDERS TO ORDER:  Continue to encourage PO intakes     Malnutrition Status:    Unable to assess    Recommendations already ordered by Registered Dietitian (RD):  Reorder vitamin D lab  Calorie counts 12/27-12/29  Boost Plus BID    Future/Additional Recommendations:  Monitor tolerance for Boost  Vit D lab results   PO intakes and need for possible EN. If EN appropriate, RD to recommend: TwoCal HN @ 45 mL/hr (1080 mL/day) to provide 2160 kcals, 91 g PRO, 756 mL H2O, 237 g CHO and 5 g Fiber daily. + 2 pkts Prosource     REASON FOR ASSESSMENT  Charanjit Ibarra is a/an 59 year old male assessed by the dietitian for Provider Order - Optimize nutrition prior to liver transplant    NUTRITION HISTORY  Per chart: End stage liver disease due to cirrohsis of the liver due to alcohol and a liver lesion concerning for HCC,  DM II and esophageal varices. Abdominal pain, distended gallbladder concerning for cholecystitis and ascites with SBP.     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 Pt not in room upon RD attempted visits.      CURRENT NUTRITION ORDERS  Diet: 2 g Sodium and High Kcal/High Protein    LABS  Labs reviewed, Na: 131 (L), Hgb A1c: 6.8 (H), Vit D: 15 (L)    MEDICATIONS  Medications reviewed    ANTHROPOMETRICS  Height: 5' 8\"  Most Recent Weight: 12/23: 176  # ( 80.2 kg)   IBW: 70 kg  BMI: Overweight BMI 25-29.9- suspect falsely elevated 2/2 to ascites.   Weight History: difficult to assess with fluid status   Wt Readings from Last 10 Encounters:   02/05/19 84.7 kg (186 lb 11.2 oz)   05/09/18 87 kg (191 lb 14.4 oz)   01/29/18 79.4 kg (175 lb)   12/19/17 83.4 kg (183 lb 14.4 oz)   11/08/17 79.4 kg (175 lb) "     Dosing Weight: 80 kg (actual)    ASSESSED NUTRITION NEEDS  Estimated Energy Needs: 7797-0269 kcals/day (25 - 30 kcals/kg)  Justification: Increased needs and Repletion  Estimated Protein Needs: 104-160 grams protein/day (1.3 - 2 grams of pro/kg)  Justification: Hypercatabolism with acute illness, Increased needs and Repletion  Estimated Fluid Needs: 1 mL/kcal/day   Justification: Maintenance and Per provider pending fluid status    PHYSICAL FINDINGS  See malnutrition section below.   Pt had > 4 loose, liquid BMs this shift  Abdomen is quite distended/rounded/taut with visible abd veins    MALNUTRITION  % Intake: Unable to assess  % Weight Loss: Unable to assess 2/2 to fluids  Subcutaneous Fat Loss: Unable to assess  Muscle Loss: Unable to assess- pt not physically in room upon attempted visits  Fluid Accumulation/Edema: trace edema, however significant ascites.   Malnutrition Diagnosis: Unable to determine due to pt not physically in room upon attempted visits    NUTRITION DIAGNOSIS  Increased nutrient needs kcals/pro related to hx of etoh and liver dysfunction as evidenced by estimated needs of 25-30+ kcals/kg and 1.3-2 g/kg pro.      INTERVENTIONS  Implementation  Nutrition Education: Unable to complete due to pt not present in room upon RD visits   Enteral Nutrition - recs  Medical food supplement therapy  Multivitamin/mineral supplement therapy     Goals  Total avg nutritional intake to meet a minimum of 25 kcal/kg and 1.3 g PRO/kg daily (per dosing wt 80 kg).     Monitoring/Evaluation  Progress toward goals will be monitored and evaluated per protocol.      Vanesa Daigle, RD, MS, LD  SICU: [phone]

## 2019-12-27 NOTE — CONSULTS
Procedure Note    Attending: Jared Langford MD  Resident: N/A  Procedure: Diagnostic and therapeutic paracentesis  Indication: SBP  Pre-procedure diagnosis: Cirrhosis / SBP  Post-procedure diagnosis: Cirrhosis / SBP     The risks and benefits of the procedure were explained to patent who expressed understanding and opted to proceed.  Consent was obtained and placed in the chart.  A time out was performed.  An area of ascites was located and marked using ultrasound guidance in the left lower quadrant; the area was prepped and draped in the usual sterile fashion.  ~5 ml of 1% lidocaine was instilled and ascites located.  .The 5 Fr paracentesis catheter and needle were inserted under US guidance then the needle removed.  The apparatus was connected to vacuum bottles and a total of 4960 ml of clear dark yellow fluid  removed.   A specimen was sent for analysis. The catheter was withdrawn and the area dressed.    Patient tolerated the procedure well with no immediate complications.  The primary team was informed of the procedure.     Tahira Langford MD  [facility]   Internal Medicine   [phone]  DOS:  December 27, 2019

## 2019-12-27 NOTE — PROGRESS NOTES
12/27/19 1401   Quick Adds   Type of Visit Initial Occupational Therapy Evaluation   Living Environment   Lives With alone   Living Arrangements house   Home Accessibility stairs to enter home   Number of Stairs, Main Entrance 8   Transportation Anticipated car, drives self;family or friend will provide   Living Environment Comment Pt is confused/unreliable historian; reports 8 JENNIF[address] to main level where he can stay if needed however his main bedroom is on lower level of home   Self-Care   Usual Activity Tolerance good   Current Activity Tolerance fair   Regular Exercise No   Equipment Currently Used at Home shower chair;grab bar, toilet;grab bar, tub/shower  (4ww)   Activity/Exercise/Self-Care Comment Pt reports no AD in home; sometimes uses 4ww outside the home   Functional Level   Ambulation 0-->independent   Transferring 0-->independent   Toileting 0-->independent   Bathing 1-->assistive equipment   Dressing 0-->independent   Eating 0-->independent   Communication 0-->understands/communicates without difficulty   Swallowing 0-->swallows foods/liquids without difficulty   Cognition 0 - no cognition issues reported   Fall history within last six months no   Which of the above functional risks had a recent onset or change? 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 He has been on the liver transplant list and is an overall decent candidate, per  transplant surgery   Precautions/Limitations fall precautions   Weight-Bearing Status - LUE full weight-bearing   Weight-Bearing Status - RUE full weight-bearing   Weight-Bearing Status - LLE full weight-bearing   Weight-Bearing Status - RLE full weight-bearing   Cognitive Status Examination   Orientation person;place   Level of Consciousness alert;confused   Follows Commands (Cognition) follows one step commands   Memory impaired   Attention Sustained attention impaired   Executive Function Working memory impaired, decreased storage of information for performing tasks;Self awareness/monitoring impaired   Cognitive Comment pt very tangential in conversation with several non-sensical comments/stories   Visual Perception   Visual Perception No deficits were identified   Sensory Examination   Sensory Quick Adds No deficits were identified   Pain Assessment   Patient Currently in Pain Yes, see Vital Sign flowsheet   Range of Motion (ROM)   ROM Comment BUEs WFL   Strength   Strength Comments BUEs WFL   Mobility   Bed Mobility Bed mobility skill: Sit to supine;Bed mobility skill: Scooting/Bridging   Bed Mobility Skill: Scooting/Bridging   Level of [place]: Scooting/Bridging stand-by assist   Bed Mobility Skill: Sit to Supine   Level of [place]: Sit/Supine stand-by assist   Transfer Skill: Sit to Stand   Level of [place]: Sit/Stand contact guard   Physical Assist/Nonphysical Assist: Sit/Stand verbal cues;1 person assist   Transfer Skill: Toilet Transfer   Level of [place]: Toilet contact guard   Physical Assist/Nonphysical Assist: Toilet verbal cues;1 person assist   Grooming   Level of [place]: Grooming contact guard   Physical Assist/Nonphysical Assist: Grooming verbal cues;1 person assist   Activities of Daily Living Analysis   Impairments Contributing to Impaired Activities of Daily Living balance impaired;cognition impaired;pain;strength decreased   General Therapy Interventions   Planned  "Therapy Interventions ADL retraining;IADL retraining;bed mobility training;cognition;ROM;strengthening;stretching;transfer training;home program guidelines;progressive activity/exercise   Clinical Impression   Criteria for Skilled Therapeutic Interventions Met yes, treatment indicated   OT Diagnosis decreased ADL/IADL (I)   Influenced by the following impairments pain, weakness, impaired balance, cognitive deficits   Assessment of Occupational Performance 5 or more Performance Deficits   Identified Performance Deficits functional transfers, toileting, bathing, dressing, household chores, medication management   Clinical Decision Making (Complexity) Moderate complexity   Therapy Frequency 5x/week   Predicted Duration of Therapy Intervention (days/wks) 2-3 weeks   Anticipated Discharge Disposition Transitional Care Facility   Risks and Benefits of Treatment have been explained. Yes   Patient, Family & other staff in agreement with plan of care Yes   [facility] TM \"6 Clicks\"   2016, Trustees of [facility], under license to [employer].  All rights reserved.   6 Clicks Short Forms Daily Activity Inpatient Short Form   [facility]  \"6 Clicks\" Daily Activity Inpatient Short Form   1. Putting on and taking off regular lower body clothing? 2 - A Lot   2. Bathing (including washing, rinsing, drying)? 2 - A Lot   3. Toileting, which includes using toilet, bedpan or urinal? 3 - A Little   4. Putting on and taking off regular upper body clothing? 3 - A Little   5. Taking care of personal grooming such as brushing teeth? 3 - A Little   6. Eating meals? 4 - None   Daily Activity Raw Score (Score out of 24.Lower scores equate to lower levels of function) 17   Total Evaluation Time   Total Evaluation Time (Minutes) 7     "

## 2019-12-27 NOTE — PROGRESS NOTES
[facility] Progress Note - Hospitalist Service, Gold 4       Date of Admission:  12/24/2019  Assessment & Plan   Charanjit Ibarra is a 59 year old male admitted on 12/24/2019. He has a history of cirrhosis secondary to alcohol abuse now sober for 5 years, HCC, DM II and esophageal varices who was admitted to the hospital as a transfer from [facility] with worsening liver failure and spontaneous bacterial peritonitis.    Plan for today:  - Therapeutic para today with removal of ~5L   - Albumin 25% 25gm, with repeat lactate 4.2 -> additional albumin 5% 500 mg    - GI consulted for ERCP, given rising lactate and WBC, should proceed with ERCP ASAP     - Initiate vancomycin   - Initiate micafungin per transplant surgery recommendation   - ID consulted  - Transfer to       # Decompensated cirrhosis, AMELIA and spontaneous bacterial peritonitis: Admitted to [facility] on 12/23 with spontaneous bacterial peritonitis and E coli bacteremia.  Put on Invanz and ciprofloxacin due to penicillin allergy. E coli bacteremia sensitive to Cipro from OSH. Due to bilirubin steadily worsening and increase in creatinine, patient was transferred to [facility] for further treatment as well as evaluation by GI for liver transplant. Of note he does have a history of hepatocellular carcinoma and has follow-up scheduled for January to address this. RUQ US w/ doppler 12/25 with patent doppler evaluation. On admission to [facility], patient started on albumin challenge, repeat paracentesis with 14K WBC and culture growing E coli. Antibiotic transitioned to ertapenem 12/26.Tbli continues to increase to 16 today. Creatinine up trending to 1.2 today. CT abdomen revealed protal vein thrombosis and possible cholecystitis. AFP level <1.5. Pt has significant abdominal pain.   MELD-Na score: 31 at 12/27/2019  6:13 AM  MELD score: 28 at 12/27/2019  6:13 AM  Calculated from:  Serum Creatinine: 1.21 mg/dL at  12/27/2019  6:13 AM  Serum Sodium: 129 mmol/L at 12/27/2019  6:13 AM  Total Bilirubin: 16.4 mg/dL at 12/27/2019  6:13 AM  INR(ratio): 2.22 at 12/27/2019  6:13 AM  Age: 59 years  - GI consulted and appreciate recommendations, plan for advanced endoscopy to see today for ERCP   - Therapeutic para today with ~ 5L removed, follow cultures    - Albumin 25% 25 mg following paracentesis, repeat lactate 4.2 -> additional albumin 5% 500 ml.   - Continue ertapenem, add vancomycin and micafungin   - Continue Vit K 5 mg IV daily x 3/3 days  - Continue to hold PTA diuretics in setting of acute kidney injury, as well as PTA propranolol given SBP  - Continue home lactulose and rifaximin with RN driven lactulose protocol.  - Daily Meld labs  - Consult nutrition to optimize nutritional status  - Cardiology consult for pre-transplant optimization, recommended dubamine stress test to be completed once clinically stabilizes   - Social work consult to establish sobriety plan post transplant   - Will need CT chest to complete HCC staging     # Dilated gallbladder with concern for cholecystitis vs cholangitis   CT abdomen revealed distended gallbladder measuring up to 12.2 cm in length with possible gallbladder wall thickening. Unable to determine if this is secondary to large volume ascites. Patient with positive meraz's sign on exam with generalized abdominal pain into his back. Peritoneal fluid with gram negative rods on gram stain. Hemodynamically stable. HIDA positive for cholecystitis. WBC increasing to 26K, procalcitonin elevated at 9, lactate increasing today.   - Transplant surgery consulted recommended GI for advanced endoscopy and stent    - GI consulted, plan for advanced endoscopy ASAP    - Transfer to    - Repeat lactate at 1500    - Antibiotics as above     # Portal vein thrombosis   Noted on CT abdomen 12/25. Portal venous hypertension with splenomegaly and splenic varices with branch portal vein thrombus in the  posterior right portal vein.    - Discussed with GI, appreciate recommendations on need for AC will hold currently given need for procedure      # T2DM: Patient very unclear on how much insulin he is actually taking PTA.  Essentially records show he is likely on 15 units of glargine at bedtime and sliding scale insulin. A1C this am 6.8%. BGs stable since transfer but persistently >200. Glargine decreased due to NPO to 10 units QHS  - Continue glargine 10 units HS  - Continue HSSI  - Accuchecks every 4 hours while NPO   - Hypoglycemic protocol     # Anemia, thrombocytopenia: Appears baseline hgb around 10. Hgb stable at baseline. Plt count appears chronically low with plt count this am at 70 likely at BL.  - Daily CBC     # Hypothyroidism: Continue home levothyroxine 50 mcg Qday    Diet: Calorie Counts  Snacks/Supplements Adult: Boost Plus; Between Meals  NPO per Anesthesia Guidelines for Procedure/Surgery Except for: Meds    DVT Prophylaxis: Pneumatic Compression Devices  Keith Catheter: not present  Code Status: Full Code      Disposition Plan   Expected discharge: 4 - 7 days, recommended to transitional care unit once adequate pain management/ tolerating PO medications, antibiotic plan established, mental status at baseline, renal function improved, safe disposition plan/ TCU bed available and SIRS/Sepsis treated.  Entered: EDNA Bryson 12/27/2019, 11:39 AM       The patient's care was discussed with the Attending Physician, Dr. Michele, Bedside Nurse, Patient and GI, transplant surgery Consultant.    EDNA Bryson  Hospitalist Service, [address]  Pager: [phone]  Please see sticky note for cross cover information  ______________________________________________________________________    Interval History   Patient reports increasing pain in his abdomen. Reports he is hungry and would like to drink fluids. Denies nausea or vomiting.     Denies fevers,  chills, SOB, palpitations, dyspnea, diarrhea, dysuria, weakness, numbness, tingling, rash.     Data reviewed today: I reviewed all medications, new labs and imaging results over the last 24 hours.     Physical Exam   Vital Signs: Temp: 98.2  F (36.8  C) Temp src: Oral BP: 130/80 Pulse: 114   Resp: 16 SpO2: 90 % O2 Device: None (Room air)    Weight: 0 lbs 0 oz  GENERAL: Alert and oriented x 3. Distress due to abdominal pain.   HEENT: Sclera icterus. PERRL. Mucous membranes dry.   CV: RRR. S1, S2. No murmurs appreciated.   RESPIRATORY: Effort normal on RA. Lungs CTAB with no wheezing, rales, rhonchi.   GI: Abdomen distended with tenderness to palpation globally, most significantly in suprapubic area. Bowel sounds present.   MUSCULOSKELETAL: No joint swelling or tenderness. Moves all extremities.   NEUROLOGICAL: No focal deficits.   EXTREMITIES: Trace bilateral peripheral edema. Intact bilateral pedal pulses.   SKIN: + jaundice. No rashes.     Data   Recent Labs   Lab 12/27/19  0613 12/26/19  0630 12/25/19  0518   WBC 26.9* 16.4* 10.7   HGB 11.4* 10.4* 9.5*   MCV 99 101* 98   * 52* 70*   INR 2.22* 2.21* 2.33*   * 131* 130*   POTASSIUM 3.9 4.9 4.7   CHLORIDE 100 102 100   CO2 19* 18* 20   BUN 44* 36* 38*   CR 1.21 1.11 1.13   ANIONGAP 10 11 10   TRA 10.1 9.4 8.7   * 224* 220*   ALBUMIN 4.3 3.7 3.7   PROTTOTAL 6.5* 6.1* 6.4*   BILITOTAL 16.4* 14.6* 13.9*   ALKPHOS 72 64 60   ALT 23 24 21   AST 32 61* 47*     Recent Results (from the past 24 hour(s))   NM HepatOBiliary Scan    Narrative    Examination:  NM HEPATOBILIARY SCAN      Date: 12/26/2019 4:49 PM.    Indication:   Right upper quadrant pain, suspected cholecystitis,  underlying cirrhosis of the liver.     Additional Information: none    Technique:    The patient received 6 mCi of Tc-99m Choletec intravenously. Images  were obtained out through 60 minutes.   At 60 minutes the patient  received 2 mg of morphine intravenously. An additional 45  minutes of  images were obtained looking for gallbladder filling.    Findings:    There is prolonged clearing of the radionuclide from the blood pool  into the liver. There is enough extraction by the liver that there is  passage of the radionuclide into the biliary system and small bowel.  No gallbladder is visualized during the first 60 minutes. The patient  received 2 mg of morphine intravenously trying to stimulate sphincter  of Andrés contraction to fill the gallbladder passively. An additional  45 minutes of images was obtained and there is no visualization of the  gallbladder.    Enterogastric reflux was not present.      Impression    Impression:    1. Nonvisualization gallbladder is consistent in the proper clinical  setting with cholecystitis.  2. Underlying hepatocellular dysfunction with reduction of clearance  of the radionuclide from the blood pool.      =======================    The normal Gall Bladder ejection fraction for a 45 minute infusion is  >40%    CAPRICE GUARDADO MD     Medications     - MEDICATION INSTRUCTIONS -         calcium citrate-vitamin D  2 tablet Oral BID     ertapenem (INVanz) IV  1 g Intravenous Q24H     ferrous sulfate  325 mg Oral BID     insulin aspart  1-6 Units Subcutaneous Q4H     insulin glargine  10 Units Subcutaneous At Bedtime     lactulose  20 g Oral TID     levothyroxine  50 mcg Oral Daily     lidocaine  1-2 patch Transdermal Q24h    And     lidocaine   Transdermal Q8H     magnesium chloride  535 mg Oral Daily     micafungin  100 mg Intravenous Q24H     modafinil  100 mg Oral Daily     pantoprazole  40 mg Oral BID AC     rifaximin  550 mg Oral BID     sodium chloride (PF)  3 mL Intracatheter Q8H     sodium chloride (PF)  3 mL Intracatheter Q8H     vancomycin (VANCOCIN) IV  1,750 mg Intravenous Q12H

## 2019-12-27 NOTE — CONSULTS
GENERAL ID SERVICE CONSULTATION     Patient:  Charanjit Ibarra   Date of birth 1960, Medical record number [id number]  Date of Visit:  12/27/2019  Date of Admission: 12/24/2019  Consult Requester:Angel Michele MD            Assessment and Recommendations:   ASSESSMENT:  1. SPB with complicating bacteremia due to pan-S E coli. Ascites WBC trending down nicely from 59K at [facility] on 12/23 to 4K on 12/27. Unclear that dual-therapy with FQ and carbapenem are necessary, but while cholecystitis is being entertained I think dual-coverage is reasonable  2. Decompensated cirrhosis due to #1. Being considered for liver tx  3. AMELIA with Cr 1.5 from prior baseline of 1  4. Profound leukocytosis. Unclear etiology. SBP seems less likely since counts are coming down. I agree that cholecystitis is a possibility. ERCP is planned for tomorrow. CDI would be another possibility. Acuity seems less consistent with cryptococus but this is a possibility in a cirrhotic patient. Pna seems unlikely given his lack of hypoxia.   5. ETOH cirrhosis, sober x3-4 years  6. Hypothyroidism  7. Chronic pain due to DJD, managed at pain clinic  8. Thrombocytopenia, likely related to #5    RECOMMENDATION:  1. Reasonable to continue very broad therapy with cipro and ertapenem for intra-abdominal coverage, vanco and micafungin for additional empiric coverage.  2. Would consider adding Po vanco and testing for CDI. If positive and WBC stays >20 may need additional interventions such as vanco enema  3. If bl cx remain negative tomorrow for GPC, could stop vanco in an effort to reduce nephrotox risk  4. If no evidence fungal infection (ie, bl cx remain negative) would consider stopping micafungin after ~3 days bl cx incubation  5. Agree with ERCP - if pus and/or evidence cholangitis, please send bacterial cultures. If additional bl cx intra-abdominal cx remain negative, would ultimately aim to narrow abx to monotherpy since his E coli was pan-S.  6.  "Consider serum cryptococcal Ag but I  Don't think it's necessary to cover empirically for cryptococcus with fluconazole. If his MS deteriorates, could consider LP to rule this out.    Thanks for this consult. ID will follow. Dr. Kaykay Fonseca will cover this weekend if there are questions, and Dr. Yessi Ching will assume care next week.    Rose Marie Barr MD   of Medicine, Division of Infectious Diseases  [facility] [phone]        ________________________________________________________________    Consult Question:.  Admission Diagnosis: Deompensated Liver Cirrhosis  Spontaneous bacterial peritonitis (H)         History of Present Illness:     This is a 60 y/o man with PMH ETOH cirrhosis and HCC, DJD with chronic back pain, and hypothyroidism. HE has approximately 3-4 years of sobriety. He was admitted to [facility] on 12/22 with abdominal pain. He was found to have SBP with ascites WBC 59K with growth of pan-S E coli from ascites fluid and also accompanying E coli bacteremia -first/last  positive 12/22, first negative appears to be 12/25 (here at [facility]). He was placed on cipro and ertapenem. He developed progressive abdominal distension with rising bilirubin level (5.4 to 14.2) and WBC 5.4 to 14.5 despite adequate abx. He was transferred on 12/25. Continued on cipro and ertapenem. His WBC decreased to 10.7 but has subsequently risen to a peak of 26.9 this morning, was 23.9 this afternoon on re-check. Serial imaging has been ambiguous about the possibility of cholecystitis - gallbladder is non-visualized on HIDA and demonstrated wall thickening and sludge on US as well as CT, but this may be attributable to liver failure. Vanco and micafungin were added to his regimen.    In taking with Nasir, his CC is abdominal pain. Yesterday pain intensity was 8-11/10, and today is \"off the charts.\" He reports not stooling since his admission, but I/O data reflect 5BM yesterday but none today. No SOBr. No chest " "pain. No urinary issues.    He is being evaluated for potential liver transplant.      Recent culture results include:  Culture Micro   Date Value Ref Range Status   12/27/2019 No growth after 5 hours  Preliminary   12/27/2019 No growth after 5 hours  Preliminary   12/26/2019 No growth after 16 hours  Preliminary   12/26/2019 No growth after 16 hours  Preliminary   12/25/2019 (A)  Preliminary    Light growth  Escherichia coli  Susceptibility testing in progress     12/25/2019   Preliminary    Critical Value/Significant Value, preliminary result only, called to and read back by  LISBETH PEREZ RN, 12.26.19 AT 0840. JL     12/25/2019 No growth after 2 days  Preliminary   12/25/2019 No growth after 2 days  Preliminary              Review of Systems:   CONSTITUTIONAL:  No fevers or chills  EYES: + icterus  ENT:  negative for hearing loss, tinnitus and sore throat  RESPIRATORY:  negative for cough with sputum and dyspnea  CARDIOVASCULAR:  negative for chest pain, dyspnea  GASTROINTESTINAL:  See HPI.  Pain, also thirsty.   GENITOURINARY:  negative for dysuria  HEME:  No easy bruising  INTEGUMENT:  negative for rash and pruritus  NEURO:  Negative for headache           Past Medical History:     Past Medical History:   Diagnosis Date     Alcoholic cirrhosis (H)      Ascites      DJD (degenerative joint disease)      Hypothyroid      Long term (current) use of opiate analgesic             Past Surgical History:     Past Surgical History:   Procedure Laterality Date     knee arthoplasty  2014            Family History:     Family History   Problem Relation Age of Onset     Diabetes Type 2  Father      Hypothyroidism Sister      Hypothyroidism Brother             Social History:     Social History     Tobacco Use     Smoking status: Former Smoker     Types: Cigarettes     Start date: 12/19/1977     Smokeless tobacco: Never Used     Tobacco comment: \"smoked for a few months at age 16\"   Substance Use Topics     Alcohol use: No     " Comment: Aug. 2016 (pt unsure of date)     History   Sexual Activity     Sexual activity: Not on file            Current Medications        calcium citrate-vitamin D  2 tablet Oral BID     ertapenem (INVanz) IV  1 g Intravenous Q24H     ferrous sulfate  325 mg Oral BID     insulin aspart  1-6 Units Subcutaneous Q4H     insulin glargine  10 Units Subcutaneous At Bedtime     lactulose  20 g Oral TID     levothyroxine  50 mcg Oral Daily     lidocaine  1-2 patch Transdermal Q24h    And     lidocaine   Transdermal Q8H     magnesium chloride  535 mg Oral Daily     micafungin  100 mg Intravenous Q24H     modafinil  100 mg Oral Daily     pantoprazole  40 mg Oral BID AC     rifaximin  550 mg Oral BID     sodium chloride (PF)  3 mL Intracatheter Q8H     sodium chloride (PF)  3 mL Intracatheter Q8H     vancomycin  250 mg Oral 4x Daily     vancomycin (VANCOCIN) IV  1,750 mg Intravenous Q12H            Allergies:     Allergies   Allergen Reactions     Amoxicillin Anaphylaxis     Aspirin Other (See Comments)     Pt states that NSAIDS severely lowers his platelets     Nsaids Other (See Comments)     Pt states that NSAIDS severely lowers his platelets            Physical Exam:   Vitals were reviewed  Patient Vitals for the past 24 hrs:   BP Temp Temp src Pulse Resp SpO2   12/27/19 1630 126/78 98  F (36.7  C) Oral 121 24 96 %   12/27/19 1555 116/75 98.1  F (36.7  C) Oral 111 25 96 %   12/27/19 1522 122/75 98.3  F (36.8  C) Oral 109 20 96 %   12/27/19 1243 (!) 140/74 98.1  F (36.7  C) Oral 122 (!) 32 97 %   12/27/19 1151 (!) 143/79 98.2  F (36.8  C) Oral 109 18 90 %   12/27/19 0721 130/80 98.2  F (36.8  C) Oral 114 16 90 %   12/27/19 0541 130/70 97.8  F (36.6  C) Oral 104 16 92 %   12/27/19 0137 123/73 98  F (36.7  C) Oral 82 16 90 %   12/26/19 2340 119/73 98.4  F (36.9  C) Oral 112 16 94 %   12/26/19 2102 114/82 98.6  F (37  C) Oral 110 16 94 %   12/26/19 2040 110/62 98.9  F (37.2  C) Oral 101 16 94 %   12/26/19 1912 137/76 98.4  F  (36.9  C) Oral 107 14 94 %     Ranges for his vital signs:  Temp:  [97.8  F (36.6  C)-98.9  F (37.2  C)] 98  F (36.7  C)  Pulse:  [] 121  Resp:  [14-32] 24  BP: (110-143)/(62-82) 126/78  SpO2:  [90 %-97 %] 96 %    Physical Examination:  GENERAL:  Chronically ill, in bed, no acute distress  HEENT:  Head is normocephalic, atraumatic   EYES:  + icterus   ENT:  Oropharynx is moist   NECK:  Supple. No  Cervical lymphadenopathy  LUNGS:  Clear to auscultation bilateral.   CARDIOVASCULAR:  tachycardic  ABDOMEN:  +++distension. I do not appreciate rigidity  SKIN:  ++ jaundice. Line(s) are in place without any surrounding erythema or exudate. No stigmata of endocarditis.  NEUROLOGIC:  He makes tangential remarks, but this seems to be his baseline. He knows his location, the president, and the year. He does not appear encephalopathic.         Laboratory Data:     Inflammatory Markers  No lab results found.    Hematology Studies    Recent Labs   Lab Test 12/27/19  1536 12/27/19 0613 12/26/19 0630 12/25/19 0518 02/05/19  0942 05/09/18  0732   WBC 23.9* 26.9* 16.4* 10.7 5.3 4.2   HGB 10.4* 11.4* 10.4* 9.5* 13.3 10.6*   MCV 97 99 101* 98 96 99   PLT 88* 116* 52* 70* 65* 55*       Immune Globulin Studies  No lab results found.    Metabolic Studies     Recent Labs   Lab Test 12/27/19  0613 12/26/19  0630 12/25/19 0518 02/05/19  0942 05/09/18  0732   * 131* 130* 135 133   POTASSIUM 3.9 4.9 4.7 4.5 4.0   CHLORIDE 100 102 100 103 99   CO2 19* 18* 20 26 28   BUN 44* 36* 38* 18 14   CR 1.21 1.11 1.13 1.08 1.08   GFRESTIMATED 64 71 70 74 70       Hepatic Studies    Recent Labs   Lab Test 12/27/19  0613 12/26/19  0630 12/25/19 0518 02/05/19  0942 05/09/18  0732 12/19/17  0817   BILITOTAL 16.4* 14.6* 13.9* 5.1* 2.2* 4.5*   ALKPHOS 72 64 60 153* 129 144   ALBUMIN 4.3 3.7 3.7 3.2* 2.7* 3.2*   AST 32 61* 47* 29 23 26   ALT 23 24 21 23 18 17       Thyroid Studies    Recent Labs   Lab Test 12/19/17  0817   TSH 3.17        Microbiology:  Culture Micro   Date Value Ref Range Status   12/27/2019 No growth after 5 hours  Preliminary   12/27/2019 No growth after 5 hours  Preliminary   12/26/2019 No growth after 16 hours  Preliminary   12/26/2019 No growth after 16 hours  Preliminary   12/25/2019 (A)  Preliminary    Light growth  Escherichia coli  Susceptibility testing in progress     12/25/2019   Preliminary    Critical Value/Significant Value, preliminary result only, called to and read back by  LISBETH PEREZ RN, 12.26.19 AT 0840. JL     12/25/2019 No growth after 2 days  Preliminary   12/25/2019 No growth after 2 days  Preliminary       Urine Studies    Recent Labs   Lab Test 12/19/17  0906   LEUKEST Negative   WBCU <1       Vancomycin Levels  No lab results found.    Invalid input(s): VANCO    Serostatus:  No results found for: CMVG, CMIGG, CMIG, CMIM, CMVIGM, CMVM, CMLTX, HSVG1, HSVG2, HSVTP1, CM9688, HS12M, HS12GR, HS1GR, HS2GR, HERPES, HSIM, HSIG, HSIGR, HSVIGMAB, HSVG1, VARICZOSAB, EBVIGG, EBIGG, EBVAGN, XL8103  No results found for: EBIG2, EBIGM, TOXG  No lab results found.    Invalid input(s): HSV12, VZVG, IBS804    Toxoplasma Testing  No lab results found.    Invalid input(s): TOXPL, TOXABM, TOXPCR    Virology:  CMV viral loads  No lab results found.  No results found for: CMQNT, CMVQ  EBV viral loads   No lab results found.  No results found for: EBVDN, EBRES, EBVDN, EBVSP, EBVPC, EBVPCR  BK viral loads No lab results found.    Invalid input(s): BKDN, BKVPC, BKVPCR  HSV testing  No lab results found.    Hepatitis B Testing   Recent Labs   Lab Test 12/19/17  0817   HBCAB Nonreactive   HEPBANG Nonreactive     Hepatitis C Testing     Hepatitis C Antibody   Date Value Ref Range Status   12/19/2017 Nonreactive NR^Nonreactive Final     Comment:     Assay performance characteristics have not been established for newborns,   infants, and children       Respiratory Virus Testing    No results found for: RS, FLUAG

## 2019-12-27 NOTE — PHARMACY-VANCOMYCIN DOSING SERVICE
Pharmacy Vancomycin Initial Note  Date of Service 2019  Patient's  1960  59 year old, male    Indication: Intra-abdominal infection per consult. Patient with Ecoli bacteremia and peritonitis for which he has been receiving ertapenem (see below for cultures and sensitivities from Care Everywhere). Patient now with markedly rising WBC (10.7 -> 16.4 -> 26.9), elevated procalcitonin (9.57) and elevated lactic acid (3.5 this morning). Cultures currently are positive for LF GNR from  ascites fluid. Blood cultures  and  are pending but negative to date.         Current estimated CrCl = CrCl cannot be calculated (Unknown ideal weight.).    Creatinine for last 3 days  2019:  5:18 AM Creatinine 1.13 mg/dL  2019:  6:30 AM Creatinine 1.11 mg/dL  2019:  6:13 AM Creatinine 1.21 mg/dL    Recent Vancomycin Level(s) for last 3 days  No results found for requested labs within last 72 hours.      Vancomycin IV Administrations (past 72 hours)      No vancomycin orders with administrations in past 72 hours.                Nephrotoxins and other renal medications (From now, onward)    Start     Dose/Rate Route Frequency Ordered Stop    19 0800  vancomycin (VANCOCIN) 1,750 mg in sodium chloride 0.9 % 500 mL intermittent infusion      1,750 mg  over 2 Hours Intravenous EVERY 12 HOURS 19 0755            Contrast Orders - past 72 hours (72h ago, onward)    Start     Dose/Rate Route Frequency Ordered Stop    19 1400  technetium Tc 99m mebrofenin (CHOLETEC) radioisotope injection 4.8-7.2 millicurie      4.8-7.2 millicurie Intravenous ONCE 19 1348 19 1433    19 2145  iopamidol (ISOVUE-370) solution 116 mL      116 mL Intravenous ONCE 19 2135 19 2314                Plan:  1.  Start vancomycin  1750 mg (~ 20 mg/kg) IV q12h.   2.  Goal Trough Level: 15-20 mg/L (will shoot for higher sepsis dosing pending ID work-up)  3.  Pharmacy will check trough  levels as appropriate in 1-3 Days.    4. Serum creatinine levels will be ordered daily for the first week of therapy and at least twice weekly for subsequent weeks.    5. [place] method utilized to dose vancomycin therapy: Method 1    Lnenie Lobato, PharmD, BCPS  Pager [phone]

## 2019-12-27 NOTE — PROGRESS NOTES
Brief medicine note:     Discussed with GI. Would like to proceed with ERCP in AM. Expressed concern of patient decline today. If further decline this evening recommended to call GI on call for possible percutaneous tube placement.    - Ordered for 2 unit FFP and 2 units platelets for ERCP tomorrow AM at 0800 12/18    - Recheck platelets at 1500 (transfuse if plts <75) goal of plts ~ 75 for ERCP tomorrow morning    - Repeat CBC, INR and CMP in AM     Malathi Motta PA-C  Internal Medicine [facility]   [phone]

## 2019-12-27 NOTE — PROGRESS NOTES
Sepsis Evaluation Progress Note    I was called to see Charanjit Ibarra due to abnormal vital signs triggering the Sepsis SIRS screening alert. He is known to have an infection.     Physical Exam   Vital Signs:  Temp: 98.9  F (37.2  C) Temp src: Oral BP: 110/62 Pulse: 101   Resp: 16 SpO2: 94 % O2 Device: None (Room air)      Lab:  Lactate for Sepsis Protocol   Date Value Ref Range Status   12/26/2019 2.9 (H) 0.7 - 2.0 mmol/L Final     Comment:     SIGNIFICANT VALUE NOTIFIED TO MARIAM REID  RN 7A       The patient is at baseline mental status.     The rest of their physical exam is significant for patient lying in bed and conversant. Complaints of RUQ abdominal pain that is at BL. Tachycardic to 101, II/VI ADY, Breathing non-labored on RA. Abdomen is distended, though soft. Alert and roriented x3.     Assessment & Plan   Charanjit Ibarra meets SIRS criteria AND has a lactate >2 or other evidence of acute organ damage.  These vital signs, lab and physical exam findings are consistent with SEVERE SEPSIS.    Sepsis Time-Zero (time severe sepsis diagnosis confirmed): 2034 12/26/19 as this was the time when Lactate resulted, and the level was > 2.0     Anti-infectives (From now, onward)    Start     Dose/Rate Route Frequency Ordered Stop    12/25/19 2030  ertapenem (INVanz) 1 g vial to attach to  mL bag      1 g  over 30 Minutes Intravenous EVERY 24 HOURS 12/25/19 2018 12/24/19 2015  rifaximin (XIFAXAN) tablet 550 mg      550 mg Oral 2 TIMES DAILY 12/24/19 2008          Current antibiotic coverage is appropriate for source of infection.    3 Hour Severe Sepsis Bundle Completion:  1. Initial Lactic Acid Result: No lab results found.  2. Blood Cultures before Antibiotics: Yes  3. Broad Spectrum Antibiotics Administered: yes  4. Fluids: will give 25g 25% albumin x1          Lab: Repeat lactic acid ordered for 2 hours from now.    Disposition: The patient will remain on the current unit. We will continue  to monitor this patient closely.  Marci Victoria PA-C    Sepsis Criteria   Sepsis: 2+ SIRS criteria due to infection  Severe Sepsis: Sepsis AND 1+ new sign of acute organ dysfunction (Note: lactate >2 is organ dysfunction)  Septic Shock: Sepsis AND hypotension despite volume resuscitation with 30 ml/kg crystalloid
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
## 2019-12-27 NOTE — PROGRESS NOTES
[facility]    Hepatology Follow-up    CC: SBP    Dx: Decompensated EtOH cirrhosis   Culture positive SBP   E. Coli Bacteremia   Possible HCC    24 hour events: Rising lactate (4.2), WBC (27)     HIDA concerning for acute cholecystitis     Ongoing diffuse abdominal pain     Para with 3690 PMNs    Subjective:    Generalized abdominal pain, no nausea or vomiting.    Medications  Current Facility-Administered Medications   Medication Dose Route Frequency     calcium citrate-vitamin D  2 tablet Oral BID     ertapenem (INVanz) IV  1 g Intravenous Q24H     ferrous sulfate  325 mg Oral BID     insulin aspart  1-6 Units Subcutaneous Q4H     insulin glargine  10 Units Subcutaneous At Bedtime     lactulose  20 g Oral TID     levothyroxine  50 mcg Oral Daily     lidocaine  1-2 patch Transdermal Q24h    And     lidocaine   Transdermal Q8H     magnesium chloride  535 mg Oral Daily     modafinil  100 mg Oral Daily     pantoprazole  40 mg Oral BID AC     rifaximin  550 mg Oral BID     sodium chloride (PF)  3 mL Intracatheter Q8H     sodium chloride (PF)  3 mL Intracatheter Q8H     vancomycin (VANCOCIN) IV  1,750 mg Intravenous Q12H       Review of systems  A 10-point review of systems was negative, unless stated above    Examination  /80 (BP Location: Right arm)   Pulse 114   Temp 98.2  F (36.8  C) (Oral)   Resp 16   SpO2 90%     Intake/Output Summary (Last 24 hours) at 12/27/2019 0904  Last data filed at 12/26/2019 2335  Gross per 24 hour   Intake 300 ml   Output --   Net 300 ml       General: Looks well, NAD, jaundiced  CVS: RRR  Resp: CTA  Abdomen: Moderately distended, minimally tender to palpation, unable to elicit Mancera's with deep palpation in the RUQ  Extremities: No edema  Neuro: AAO X 3, asterixis not present  Skin: no rash  Psych: normal mood    Laboratory  Lab Results   Component Value Date     12/27/2019    POTASSIUM 3.9 12/27/2019    CHLORIDE 100 12/27/2019    CO2 19  12/27/2019    BUN 44 12/27/2019    CR 1.21 12/27/2019       Lab Results   Component Value Date    BILITOTAL 16.4 12/27/2019    ALT 23 12/27/2019    AST 32 12/27/2019    ALKPHOS 72 12/27/2019       Lab Results   Component Value Date    WBC 26.9 12/27/2019    HGB 11.4 12/27/2019    MCV 99 12/27/2019     12/27/2019       Lab Results   Component Value Date    INR 2.22 12/27/2019       MELD-Na score: 31 at 12/27/2019  6:13 AM  MELD score: 28 at 12/27/2019  6:13 AM  Calculated from:  Serum Creatinine: 1.21 mg/dL at 12/27/2019  6:13 AM  Serum Sodium: 129 mmol/L at 12/27/2019  6:13 AM  Total Bilirubin: 16.4 mg/dL at 12/27/2019  6:13 AM  INR(ratio): 2.22 at 12/27/2019  6:13 AM  Age: 59 years       Radiology  CT Abd/Pelvis (12/25/19):  IMPRESSION:  1. Changes from chronic liver disease with cirrhotic and macronodular  liver. Portal venous hypertension with splenomegaly and splenic  varices. Branch portal vein thrombus in the posterior right portal  vein. There are also varices in the anterior abdominal wall.  2. Large volume simple ascites in all 4 quadrants. In the pelvis there  is a small amount of enhancement about the simple appearing fluid  indicating some loculated component. Findings consistent with recent  ascites fluid analysis suggesting SBP.  3. Distended gallbladder measuring up to 12.2 cm in length. Possible  gallbladder wall thickening although this may be secondary to large  volume ascites. Ultrasound and CT performed at outside hospital  12/23/2019 also showed distended gallbladder with sludge and stones.  Unable to rule out acute cholecystitis.  4. Some loops of bowel within the lower anterior abdomen demonstrate a  slightly thickened appearance which may be reactive to the ascites. No  evidence for bowel obstruction  5. Anterior abdominal wall hernia with ascitic fluid in the anterior  abdominal wall. There is also a right inguinal canal hernia with  Fluid.    HIDA (12/26/19):  1. Nonvisualization  gallbladder is consistent in the proper clinical  setting with cholecystitis.  2. Underlying hepatocellular dysfunction with reduction of clearance  of the radionuclide from the blood pool.     Assessment  59 year old w/ EtOH use disorder and decompensated cirrhosis (varices, ascites, likely HCC) admitted for liver transplant evaluation and culture-positive SBP.    Hospital course notable for partially treated culture positive SBP (para 12/27 with 3600 PMN), and likely acute cholecystitis (HIDA without gallbladder uptake) with persistent diffuse abdominal pain, rising WBC (26K), lactate (4.2) and procalcitonin concerning ongoing source of infection. Plan for ERCP for transpapillary drainage of the gallbladder in addition to ongoing supportive cares and IV antibiotiocs with gram negative coverage.     Transplant evaluation initiated but on hold in setting of acute infections above.    Recommendations:  -- Tentative plan for ERCP for possible   -- Continue appropriate IV antibiotics with gram negative coverage (currently on ertapenem)  -- Please transfuse 2u FFP and have 2u plts available for ERCP at 0800 12/28  -- Please re-check platelets this evening, if <75 please transfuse platelets as well for goal ~ 75 (as able) for ERCP tomorrow morning  -- Please check early morning CBC, INR, CMP (0400 labs)  -- Pending hospital course will need outpatient management of HCC  -- Please do not hesitate to contact GI on call if any clinical decompensation (transfer to ICU, hypotension etc.)    Patient seen and discussed with Dr. Leventhal.    America Parnell MD  GI Fellow, PGY-5  P: [phone]

## 2019-12-27 NOTE — CONSULTS
"Social Work Services Progress Note    Hospital Day: 4  Collaborated with:  Mr. Ibarra; ASHLEY Manuel 7A ;    Data:  A consult was received for advance directive completion.  is , and has been living with his significant other, Chantel López for 12-15 years. He has not completed an advance directive in the past, and is aware that his parents are his health care decision makers. He informed me that they are both now 82 years old, and that he doesn't want to burden them. He stated that any needed decisions might \"come down to Chantel.     Intervention:  Education related to advance directives. Providing Mr. Ibarra with the advance directive short form for completion when he is able. I spoke with the notary in our department, and she would not be available to notarize this document until Monday.     Assessment:  If  would like his significant other to be a health care agent, an advance directive would be needed. His parents are currently his health care agents.     Plan:    Anticipated Disposition:  Unkown    Barriers to d/c plan:  Medical status.     Follow Up:  The unit  will follow for disposition planning. We will collaborate on other psychosocial needs.     ASHLEY Buckner, [facility]  Pager [phone]      "

## 2019-12-27 NOTE — CONSULTS
[facility] Brief Consult Note    Assessment  59 year old w/ EtOH use disorder and decompensated cirrhosis (varices, ascites, likely HCC) admitted for liver transplant evaluation and culture-positive SBP.    Hospital course notable for partially treated culture positive SBP (para 12/27 with 3600 PMN). Patient is having ongoing abdominal pain, diffuse without a convincing meraz's sign. Despite IV antibiotics, the patient has a worsening leukocytosis to 26k and lactate infection, concerning ongoing source of infection. CT abdomen with distended GB and possible GB wall thickening, but is mild; pt with ascites and cirrhosis, which may be contributing to these findings. HIDA without gallbladder uptake.    Patient likely has ongoing reasons for abdominal discomfort, including SBP. Continue ongoing abdominal pain workup.     Recommendations:  -- Plan for transpapillary gallbladder drainage  -- Continue IV antibiotics per primary team  -- Please transfuse 2u FFP and have 2u plts available for ERCP at 0800 12/28  -- Please re-check platelets this evening, if <75 please transfuse platelets as well for goal ~ 75 (as able) for ERCP tomorrow morning    Patient seen and discussed with Dr. Herzog.    Brandy Briggs MD  P: [phone]    Subjective:  - Reports diffuse abdominal pain  - Denies fevers or chills overnight  - Denies any nausea or emesis     Medications  Current Facility-Administered Medications   Medication Dose Route Frequency     calcium citrate-vitamin D  2 tablet Oral BID     ertapenem (INVanz) IV  1 g Intravenous Q24H     ferrous sulfate  325 mg Oral BID     insulin aspart  1-6 Units Subcutaneous Q4H     insulin glargine  10 Units Subcutaneous At Bedtime     lactulose  20 g Oral TID     levothyroxine  50 mcg Oral Daily     lidocaine  1-2 patch Transdermal Q24h    And     lidocaine   Transdermal Q8H     magnesium chloride  535 mg Oral Daily     micafungin  100 mg Intravenous Q24H     modafinil  100 mg Oral Daily      pantoprazole  40 mg Oral BID AC     rifaximin  550 mg Oral BID     sodium chloride (PF)  3 mL Intracatheter Q8H     sodium chloride (PF)  3 mL Intracatheter Q8H     vancomycin  250 mg Oral 4x Daily     vancomycin (VANCOCIN) IV  1,750 mg Intravenous Q12H     Examination  /78   Pulse 121   Temp 98  F (36.7  C) (Oral)   Resp 24   SpO2 96%     Intake/Output Summary (Last 24 hours) at 12/27/2019 0904  Last data filed at 12/26/2019 2335  Gross per 24 hour   Intake 300 ml   Output --   Net 300 ml     General: Cachetic, appears distressed   HEENT: Missing teeth  CVS: Tachycardic, soft systolic murmur heard.   Resp: CTA  Abdomen: Distended, tender to minimal palpation throughout - RUQ is not more tender than any other area of the abdomen.  Extremities: No edema  Neuro: A&O X 3, asterixis not present  Skin: Jaundiced     Laboratory  Lab Results   Component Value Date     12/27/2019    POTASSIUM 3.9 12/27/2019    CHLORIDE 100 12/27/2019    CO2 19 12/27/2019    BUN 44 12/27/2019    CR 1.21 12/27/2019     Lab Results   Component Value Date    BILITOTAL 16.4 12/27/2019    ALT 23 12/27/2019    AST 32 12/27/2019    ALKPHOS 72 12/27/2019     Lab Results   Component Value Date    WBC 26.9 12/27/2019    HGB 11.4 12/27/2019    MCV 99 12/27/2019     12/27/2019     Lab Results   Component Value Date    INR 2.22 12/27/2019     MELD-Na score: 31 at 12/27/2019  6:13 AM  MELD score: 28 at 12/27/2019  6:13 AM  Calculated from:  Serum Creatinine: 1.21 mg/dL at 12/27/2019  6:13 AM  Serum Sodium: 129 mmol/L at 12/27/2019  6:13 AM  Total Bilirubin: 16.4 mg/dL at 12/27/2019  6:13 AM  INR(ratio): 2.22 at 12/27/2019  6:13 AM  Age: 59 years     Radiology  CT Abd/Pelvis (12/25/19):  IMPRESSION:  1. Changes from chronic liver disease with cirrhotic and macronodular  liver. Portal venous hypertension with splenomegaly and splenic  varices. Branch portal vein thrombus in the posterior right portal  vein. There are also varices  in the anterior abdominal wall.  2. Large volume simple ascites in all 4 quadrants. In the pelvis there  is a small amount of enhancement about the simple appearing fluid  indicating some loculated component. Findings consistent with recent  ascites fluid analysis suggesting SBP.  3. Distended gallbladder measuring up to 12.2 cm in length. Possible  gallbladder wall thickening although this may be secondary to large  volume ascites. Ultrasound and CT performed at outside hospital  12/23/2019 also showed distended gallbladder with sludge and stones.  Unable to rule out acute cholecystitis.  4. Some loops of bowel within the lower anterior abdomen demonstrate a  slightly thickened appearance which may be reactive to the ascites. No  evidence for bowel obstruction  5. Anterior abdominal wall hernia with ascitic fluid in the anterior  abdominal wall. There is also a right inguinal canal hernia with  Fluid.    HIDA (12/26/19):  1. Nonvisualization gallbladder is consistent in the proper clinical  setting with cholecystitis.  2. Underlying hepatocellular dysfunction with reduction of clearance  of the radionuclide from the blood pool.

## 2019-12-27 NOTE — PLAN OF CARE
Status: [place] Encephalopathy score of 0 x2.  Vitals: Tachy, RA.   Neuros: A&O x4.  IV: R PIV infusing LR bolus.  Resp/trach: LS clear & equal, diminished lower LS.  Diet: Poor appetite, only ate bites of dinner. Pt is to be NPO at midnight. BG checks: 202, 256. Insulin given as ordered.  Bowel status: BS+, last BM today.  : Voiding spontaneously  Skin: Bruising on skin.  Pain: Constant back pain. PRN oxycodone given (10 mg). T-pump encouraged and used.  Activity: SBA. Ambulated to bathroom.  Social: GF called for an update from provider, page sent.  Plan: Sepsis protocol triggered. Lactic acid was 1.9. Albumin given per orders. Lactic acid re-check was 3.2. LR bolus given as ordered. Will continue to monitor and follow POC.

## 2019-12-27 NOTE — PROGRESS NOTES
I've seen and evaluated patient multiple times today, has rising lactate, tachycardic but otherwise HDS and afebrile. Does have peritonitis with evidence of cholecystitis on CT and HIDA scan. GS contacted yesterday, recommended evaluation by Transplant surgery who is discussing with Hepatology regarding method of source control. Spoke with Hepatology fellow who stated that advance endoscopy team is evaluating patient. RRT was called this afternoon due to persistently elevated lactate, antifungal added to Vanc/ertapenem. Cultures are positive for Ecoli and patient needs source control. ID also consulted. Last lactate 4.2, giving albumin and rechecking, if continues to rise or no significant improvement in lactic acid, will likely transfer to ICU.   Angel Mcihele MD  Hospitalist

## 2019-12-27 NOTE — PLAN OF CARE
/75 (BP Location: Right arm)   Pulse 109   Temp 98.3  F (36.8  C) (Oral)   Resp 20   SpO2 96%      0700 - 1530  Tachycardic. Hypertensive. All other VS stable. Pt on room air. Patient has continuous abdominal pain. Patient has intermittent nausea. BG - q4h; 207 and 245. Urine Output - voiding, not saving. Bowel Function - Pt passing stool. Lactate - 4.2. Rapid called. Team notified. Albumin given (See MAR). Nutrition - NPO. PIV x 2 on right arm; saline locked. Activity - up with assistance. Bed alarm on. Education - Pt verbalizes need to have ERCP with stent placement. Plan of Care - Will continue with plan of care and notify care team of any changes. Transfer to  when room becomes available.

## 2019-12-27 NOTE — PROGRESS NOTES
Brief Medicine Crosscover Note    Discussed HIDA scan findings w/ GI Fellow who recommends NPO at MN (possible GI intervention 12/17/19), Vitamin K 10 mg IV (received dose this am). Insulin regimen has been changed for NPO status (lantus decreased to 10 units at bedtime from 20 units at bedtime; BG checks q4h, and sliding scale insulin q4h). Discussed POC with patient and bedside RN. Questions answered.   - NPO at MN  - GIve 10 units lantus at bedtime  - BG and sliding scale insulin q4h w/ NPO status    Marci Victoria PA-C  Hospitalist Service, [facility], [place]  Pager: [phone]

## 2019-12-27 NOTE — PLAN OF CARE
Discharge Planner OT   Patient plan for discharge: not discussed  Current status: OT eval completed and tx initiated. Pt oriented to self and location however very tangential in conversation and making several nonsensical statements throughout session. Pt required CGA for functional transfers, toileting, and ADLs at sink; very shaky up on feet and c/o pain/weakness.   Barriers to return to prior living situation: medical needs, weakness, impaired balance, cognitive deficits, pain  Recommendations for discharge: TCU  Rationale for recommendations: to increase pt's safety and (I) with ADL/IADLs       Entered by: Tara English 12/27/2019 3:55 PM

## 2019-12-27 NOTE — PLAN OF CARE
Status: [place] Encephalopathy score of 0, 1, 0. PRN lactulose given for score of 1.  Vitals: Tachy, other VSS. RA.  Neuros: A&O x4. Garbled speech at baseline from missing teeth/no dentures. Rambling.  IV: R PIV SL.  Resp/trach: LS clear & equal, diminished lower LS.  Diet: NPO. BG checks Q4H: 242, 209. Insulin given per order parameters.  Bowel status: BS+, last BM 12/26.  : Voiding spontaneously.  Skin: Bruising on skin.  Pain: Constant back pain. Lidocaine patch applied to lower back. T-pump applied.  Activity: SBA. Ambulated to the bathroom.  Plan: Lactic acid level 2.8, provider notified. Procalcitonin level  9.57, provider notified. Will continue to monitor and follow POC.

## 2019-12-28 NOTE — PLAN OF CARE
Neuro: A&Ox4 but pt rambles and speech is Illogical and garbled. Forgetful at times. Receiving scheduled lactulose.   Cardiac: ST. VSS. Afebrile.    Respiratory: Sating >95% on RA.  GI/: Adequate urine output. Large loose/watery, green bowel movement. C diff sample sent. Abdomen distended and taut.    Diet/appetite: NPO  Activity:  Stand by assistance, up to bathroom.   Pain: Abdominal pain/tenderness bilaterally. x1 dose IV dilaudid given.  Skin: Jaundiced throughout. Generalized bruising and blanchable erythema. LLQ paracentesis site.   LDA's:  R PIV x2 - LR bolus @ 250ml.hr, albumin, abx, NS @ 75ml/hr     Plan: Repeat lactic acid ordered. Monitoring vitals and patient status closely with increasing lactic. LR and albumin infusing. Continue antibiotics. ERCP scheduled for tomorrow am. Continue with POC. Notify primary team with changes.

## 2019-12-28 NOTE — PLAN OF CARE
Neuro: A&O4. Patient is forgetful; lactulose prn given x2.   Cardiac: ST rates in 1-teens. VSS. Afebrile  Respiratory: Sating >90 on RA.  GI/: Adequate urine output. BM X3. Abd distended and taut, pt c/o pf tenderness   Diet/appetite: NPO  Activity:  Assist of 1 up to bathroom   Pain: Oxycodone given x1 for abd pain  Skin: No new deficits noted.  LDA's: PIV's     Plan: NPO, Plan for ERCP this AM. Continue with POC. Notify primary team with changes.

## 2019-12-28 NOTE — ANESTHESIA PREPROCEDURE EVALUATION
Anesthesia Pre-Procedure Evaluation    Patient: Charanjit Ibarra   MRN:     [id number] Gender:   male   Age:    59 year old :      1960        Preoperative Diagnosis: * No pre-op diagnosis entered *   Procedure(s):  ENDOSCOPIC RETROGRADE CHOLANGIOPANCREATOGRAPHY     Past Medical History:   Diagnosis Date     Alcoholic cirrhosis (H)      Ascites      DJD (degenerative joint disease)      Hypothyroid      Long term (current) use of opiate analgesic       Past Surgical History:   Procedure Laterality Date     knee arthoplasty  2014          Anesthesia Evaluation     . Pt has had prior anesthetic.            ROS/MED HX    ENT/Pulmonary:  - neg pulmonary ROS   (+)mild COPD, , . .    Neurologic:  - neg neurologic ROS   (+)other neuro confusion    Cardiovascular:     (+) ----. : . . . :. . Previous cardiac testing date:results:Stress Testdate: results:Interpretation Summary  Conclusions: Dobutamine stress echocardiogram test negative for ischemia at a  diagnostic level of peak stress. date: results: date: results:          METS/Exercise Tolerance:     Hematologic:     (+) Anemia, -      Musculoskeletal:  - neg musculoskeletal ROS       GI/Hepatic: Comment: Alcoholic cirrhosis with ascites and esophageal varices      (+) liver disease,       Renal/Genitourinary:     (+) chronic renal disease, type: ARF,       Endo:     (+) type II DM thyroid problem hypothyroidism, .      Psychiatric:         Infectious Disease: Comment: SBP        Malignancy:         Other:    (+) H/O Chronic Pain,                       PHYSICAL EXAM:   Mental Status/Neuro: A/A/O   Airway: Facies: Feasible  Mallampati: II  Mouth/Opening: Full  TM distance: > 6 cm  Neck ROM: Full   Respiratory: Auscultation: CTAB     Resp. Rate: Normal     Resp. Effort: Normal      CV: Rhythm: Regular  Rate: Age appropriate  Heart: Normal Sounds  Edema: None   Comments: Looks unwell. Thin.     Dental: Endentulous                LABS:  CBC:   Lab Results  "  Component Value Date    WBC 17.8 (H) 12/28/2019    WBC 23.9 (H) 12/27/2019    HGB 9.4 (L) 12/28/2019    HGB 10.4 (L) 12/27/2019    HCT 27.3 (L) 12/28/2019    HCT 29.6 (L) 12/27/2019    PLT 68 (L) 12/28/2019    PLT 88 (L) 12/27/2019     BMP:   Lab Results   Component Value Date     12/28/2019     (L) 12/27/2019    POTASSIUM 3.8 12/28/2019    POTASSIUM 3.9 12/27/2019    CHLORIDE 104 12/28/2019    CHLORIDE 100 12/27/2019    CO2 20 12/28/2019    CO2 19 (L) 12/27/2019    BUN 46 (H) 12/28/2019    BUN 44 (H) 12/27/2019    CR 1.06 12/28/2019    CR 1.21 12/27/2019     (H) 12/28/2019     (H) 12/27/2019     COAGS:   Lab Results   Component Value Date    INR 2.47 (H) 12/28/2019    FIBR 293 12/25/2019     POC:   Lab Results   Component Value Date     (H) 12/28/2019     OTHER:   Lab Results   Component Value Date    LACT 4.3 (HH) 12/28/2019    A1C 6.8 (H) 12/25/2019    TRA 9.5 12/28/2019    PHOS 2.8 12/28/2019    MAG 2.0 12/28/2019    ALBUMIN 4.2 12/28/2019    PROTTOTAL 5.7 (L) 12/28/2019    ALT 20 12/28/2019    AST 27 12/28/2019    ALKPHOS 57 12/28/2019    BILITOTAL 16.9 (HH) 12/28/2019    TSH 3.17 12/19/2017        Preop Vitals    BP Readings from Last 3 Encounters:   12/28/19 95/78   02/05/19 128/71   05/09/18 112/64    Pulse Readings from Last 3 Encounters:   12/27/19 116   02/05/19 68   05/09/18 57      Resp Readings from Last 3 Encounters:   12/27/19 20   05/09/18 18   12/19/17 18    SpO2 Readings from Last 3 Encounters:   12/28/19 95%   02/05/19 97%   05/09/18 97%      Temp Readings from Last 1 Encounters:   12/28/19 36.6  C (97.8  F)    Ht Readings from Last 1 Encounters:   05/09/18 1.727 m (5' 8\")      Wt Readings from Last 1 Encounters:   02/05/19 84.7 kg (186 lb 11.2 oz)    Estimated body mass index is 28.39 kg/m  as calculated from the following:    Height as of 5/9/18: 1.727 m (5' 8\").    Weight as of 2/5/19: 84.7 kg (186 lb 11.2 oz).     LDA:  Peripheral IV 12/24/19 Right;Anterior " Upper forearm (Active)   Site Assessment [facility] 12/28/2019  4:00 AM   Line Status Infusing 12/28/2019  4:00 AM   Phlebitis Scale 0-->no symptoms 12/27/2019  8:00 PM   Infiltration Scale 0 12/27/2019  8:00 PM   Infiltration Site Treatment Method  None 12/27/2019  4:00 PM   Extravasation? No 12/27/2019  8:00 PM   Number of days: 4       Peripheral IV 12/27/19 Right;Anterior Upper forearm (Active)   Site Assessment [facility] 12/28/2019  4:00 AM   Line Status Infusing 12/28/2019  4:00 AM   Phlebitis Scale 0-->no symptoms 12/27/2019  8:00 PM   Infiltration Scale 0 12/27/2019  8:00 PM   Infiltration Site Treatment Method  None 12/27/2019  4:00 PM   Number of days: 1        Assessment:   ASA SCORE: 4            Plan:   Anes. Type:  General   Pre-Medication: None   Induction:  IV (RSI)   Airway: ETT; Oral   Access/Monitoring: PIV   Maintenance: Balanced     Postop Plan:   Postop Pain: Opioids  Postop Sedation/Airway: Not planned  Disposition: Inpatient/Admit     PONV Management:   Adult Risk Factors:, Postop Opioids   Prevention: Ondansetron, Dexamethasone       Comments for Plan/Consent:  Cirrhosis with recent decompensation. MELD about 30. Bilirubin about 16. Ascites.  Gall bladder problem with recent sepsis.  Platelets about 70,000.  Looks unwell. Completely confused.  Diabetes 2                  Jean Lazcano MD

## 2019-12-28 NOTE — PROGRESS NOTES
Pt's pre-op . Dr. Simmons notified. He ordered to give sliding scale as pt was receiving on floor. 3 units SC

## 2019-12-28 NOTE — PROVIDER NOTIFICATION
12/28/19 0000   Call Information   Date of Call 12/27/19   Time of Call 2359   Name of person requesting the team Roscoe   Title of person requesting team RN   RRT Arrival time 0003   Time RRT ended 0030   Reason for call   Type of RRT Adult   Primary reason for call Sepsis suspected   Sepsis Suspected Elevated Lactate level   Was patient transferred from the ED, ICU, or PACU within last 24 hours prior to RRT call? No   SBAR   Situation Lactic Acid = 5.1   Background admitted for Spontaneous Bacterial Peritonitis   Notable History/Conditions Cancer;Diabetes;Organ failure  (HCC, AMELIA; Laennec's Cirrhosis.)   Assessment Alert. VSS except HR= 100's.  Difficult to understand because of his lack of teeth.  Seems to ramble on about  situations in a digressive manner. Does not appear to be in distress.     Interventions Meds  (Thiamine IV)   Patient Outcome   Patient Outcome Stabilized on unit   RRT Team   Attending/Primary/Covering Physician Orly Calderon MD   Lead RN Sherri REID   RN Roscoe Hassan RN   RT Kristofer Rivera RT   Post RRT Intervention Assessment   Post RRT Assessment Other (see comment)  (Lactic Acid Repeat = 4.3)   Date Follow Up Done 12/28/19   Time Follow Up Done 0333   Comments RRT called for elevated lactic Acid

## 2019-12-28 NOTE — PROGRESS NOTES
Mr. Ibarra is a 59 year old male with cirrhosis and SBP with acute cholecystitis and associated sepsis. To achieve, source control plan is to perform an ERCP with transpapillary gallbladder stent. While patient appears to be alert and oriented, it is unclear to me if he has capacity due to his bizarre/odd thought process and inability to verbalize the planned procedure/risk. This is an emergency case however. No family/POA is available for consent. Two physician consent has been obtained by myself and Dr. Tom Leventhal to proceed with planned procedure today.     Shayan Ram MD  [facility]  Division of Gastroenterology and Hepatology  [facility] [address] [id number]

## 2019-12-28 NOTE — ANESTHESIA POSTPROCEDURE EVALUATION
Anesthesia POST Procedure Evaluation    Patient: Charanjit Ibarra   MRN:     [id number] Gender:   male   Age:    59 year old :      1960        Preoperative Diagnosis: * No pre-op diagnosis entered *   Procedure(s):  ENDOSCOPIC RETROGRADE CHOLANGIOPANCREATOGRAPHY   Postop Comments: No value filed.       Anesthesia Type:  Not documented  General    Reportable Event: NO     PAIN: Uncomplicated   Sign Out status: Comfortable, Well controlled pain     PONV: No PONV   Sign Out status:  No Nausea or Vomiting     Neuro/Psych: Uneventful perioperative course   Sign Out Status: Preoperative baseline; Age appropriate mentation     Airway/Resp.: Uneventful perioperative course   Sign Out Status: Non labored breathing, age appropriate RR; Resp. Status within EXPECTED Parameters     CV: Uneventful perioperative course   Sign Out status: Appropriate BP and perfusion indices; Appropriate HR/Rhythm     Disposition:   Sign Out in:  PACU  Disposition:  Phase II; Home  Recovery Course: Uneventful  Follow-Up: Not required           Last Anesthesia Record Vitals:  CRNA VITALS  2019 1013 - 2019 1105      2019             NIBP:  125/69    Pulse:  111          Last PACU Vitals:  Vitals Value Taken Time   /84 2019 11:00 AM   Temp 37.4  C (99.3  F) 2019 10:45 AM   Pulse 112 2019 11:00 AM   Resp 27 2019 11:00 AM   SpO2 96 % 2019 11:04 AM   Temp src     NIBP 125/69 2019 10:47 AM   Pulse 111 2019 10:47 AM   SpO2     Resp     Temp     Ht Rate     Temp 2     Vitals shown include unvalidated device data.      Electronically Signed By: Jozef Simmons MD, 2019, 11:05 AM

## 2019-12-28 NOTE — OP NOTE
ERCP 12/28/2019  9:04 AM [facility], [address] [place] [address] [phone]     Endoscopy Department   _______________________________________________________________________________   Patient Name: Charanjit Ibarra          Procedure Date: 12/28/2019 9:04 AM   MRN: [id number]                       Account Number: [id number]   YOB: 1960               Admit Type: Inpatient   Age: 59                                Gender: Male   Note Status: Finalized                Attending MD: Shayan Ram MD   Pause for the Cause: time out performed Total Sedation Time:   _______________________________________________________________________________       Procedure:           ERCP   Indications:         Acute cholecystitis, 59 year old w/ EtOH use disorder                        and decompensated cirrhosis (varices, ascites, likely                        HCC) admitted for liver transplant evaluation and                        culture-positive SBP with subsequent evidence for acute                        cholecystitis by HIDA and CT.   Providers:           Shayan Ram MD   Referring MD:           Requesting Provider: Thomas M. Leventhal, MD, Angel Michele   Medicines:           General Anesthesia, antibiotics as scheduled from the                        floor   Complications:       No immediate complications. Estimated blood loss:                        Minimal.   _______________________________________________________________________________   Procedure:           Pre-Anesthesia Assessment:                        - Prior to the procedure, a History and Physical was                        performed, and patient medications and allergies were                        reviewed. The patient is unable to give consent                        secondary to the patient being legally incompetent to                        consent. The risks and benefits of the procedure and the                         sedation options and risks were discussed with the                        patient's Emergency procedure; two physician consent                        performed. All questions were answered and informed                        consent was obtained. Patient identification and                        proposed procedure were verified by the physician, the                        nurse, the anesthesiologist and the anesthetist in the                        procedure room. Mental Status Examination: alert and                        oriented. Airway Examination: normal oropharyngeal                        airway and neck mobility. Respiratory Examination: clear                        to auscultation. CV Examination: normal. Prophylactic                        Antibiotics: The patient does not require prophylactic                        antibiotics. Prior Anticoagulants: The patient has taken                        no previous anticoagulant or antiplatelet agents. ASA                        Grade Assessment: IV - A patient with severe systemic                        disease that is a constant threat to life. After                        reviewing the risks and benefits, the patient was deemed                        in satisfactory condition to undergo the procedure. The                        anesthesia plan was to use general anesthesia.                        Immediately prior to administration of medications, the                        patient was re-assessed for adequacy to receive                        sedatives. The heart rate, respiratory rate, oxygen                        saturations, blood pressure, adequacy of pulmonary                        ventilation, and response to care were monitored                        throughout the procedure. The physical status of the                        patient was re-assessed after the procedure.                        After obtaining informed consent, the scope  was passed                        under direct vision. Throughout the procedure, the                        patient's blood pressure, pulse, and oxygen saturations                        were monitored continuously. The was introduced through                        the mouth, and used to inject contrast into and used to                        inject contrast into the bile duct. The ERCP was                        accomplished without difficulty. The patient tolerated                        the procedure well.                                                                                     Findings:        The  film was normal. The esophagus was successfully intubated        under direct vision. The scope was advanced from the mouth to the        duodenum. The pharynx, larynx and associated structures, as well as the        upper GI tract, were normal with the exception of esophageal varices        seen. The major papilla was normal. The bile duct was deeply cannulated        with the short-nosed traction sphincterotome. Contrast was injected. I        personally interpreted the bile duct images. There was brisk flow of        contrast through the ducts. Image quality was excellent. Contrast        extended to the entire biliary tree. The entire biliary tree was normal.        The gallbladder was completely obstructed. Visiglide wire was passed        into the biliary tree. Jag Revolution/Visiglide wire passed successfully        deep into the gallbladder. Dilation of the cystic duct with a 4 mm        balloon dilator was successful. One modified 7 Fr by 15 cm        transpapillary plastic Compass stent with modified side-holes with a        single external pigtail and a single internal pigtail was placed 15 cm        into the gallbladder. Pus flowed through the stent. The stent was in        good position. The biliary tree immediately drained of contrast.                                                                                      Impression:          - Esophageal varices seen with side viewing duodenoscope.                        - The major papilla appeared normal.                        - Selective biliary cannulation. Significant oozing of                        blood with just gentle cannulation. Due to coagulopathy                        and this finding, elected to avoid biliary                        sphincterotomy. Given atrophic pancreas on imaging due                        to prior alcohol use, the risk of post-ERCP pancreatitis                        in this situation seemed low enough that avoiding a                        biliary sphincterotomy seemed like the better option.                        - The cholangiogram was normal of the biliary tree other                        than a low right posterior duct take off. The cystic                        duct partially filled and the gallbladder did not fill.                        - Wire access to the gallbladder ultimately achieved                        with significant difficulty.                        - Cystic duct dilated to 4 mm                        - One modified 7 Fr x 15 cm plastic Compass stent with                        side holes was placed into the gallbladder with drainage                        of pus.                        - There was prompt drainage of contrast from the biliary                        tree, therefore seperate stenting of the common bile                        duct was not pursued as this would necessitate a biliary                        sphincterotomy which was avoided today due to                        coagulopathy.   Recommendation:      - Return patient to hospital [facility] for ongoing care.                        - Monitor for clinical improvement.                        - Monitor LFTs. If signs of biliary obstruction, would                        reverse coagulopathy, repeat ERCP, perform biliary                         sphincterotomy, and place a CBD stent.                        - Gallbladder stent to remain in place                        indefinately/until transplant unless otherwise                        clinically indicated. No repeat ERCP needed at this time.                        - Hepatology to continue following                                                                                       Shayan Ram MD

## 2019-12-28 NOTE — PROVIDER NOTIFICATION
-------------------CRITICAL LAB VALUE-------------------    Lab Value: Lactic acid 4.6   Time of notification: 7:21 PM  MD notified: Dr. Cameron   Patient status:  Temp:  [97.8  F (36.6  C)-98.9  F (37.2  C)] 98.2  F (36.8  C)  Pulse:  [] 116  Heart Rate:  [119] 119  Resp:  [16-32] 22  BP: (110-143)/(62-82) 130/71  SpO2:  [90 %-97 %] 97 %  Orders received:   Albumin   LR blous    **RRT called

## 2019-12-28 NOTE — PROGRESS NOTES
Report given to 6B RN. Belongings sent with pt  (sweatshirt and underwear). Per S.O. - pt is missing dark grey cargo sweat pants- called to ER and charge notified.  PRN lactulose given to pt for change in AMS  (per S.O.) . Awaiting results.

## 2019-12-28 NOTE — PROVIDER NOTIFICATION
0000 Lactic recheck back at 5.1, Gold crossvoer paged RRT called.  MD at bedside, 300mg Thiamine ordered.    0230 Lactic recheck back at 4.3, Gold crosscover paged RRT called.  Awaiting orders.

## 2019-12-28 NOTE — ANESTHESIA CARE TRANSFER NOTE
Patient: Charanjit Ibarra    Procedure(s):  ENDOSCOPIC RETROGRADE CHOLANGIOPANCREATOGRAPHY    Diagnosis: * No pre-op diagnosis entered *  Diagnosis Additional Information: No value filed.    Anesthesia Type:   General     Note:  Airway :Face Mask  Patient transferred to:PACU  Comments: To PACU, VSS, airway patent, RN at bedside.Handoff Report: Identifed the Patient, Identified the Reponsible Provider, Reviewed the pertinent medical history, Discussed the surgical course, Reviewed Intra-OP anesthesia mangement and issues during anesthesia, Set expectations for post-procedure period and Allowed opportunity for questions and acknowledgement of understanding      Vitals: (Last set prior to Anesthesia Care Transfer)    CRNA VITALS  12/28/2019 1013 - 12/28/2019 1049      12/28/2019             NIBP:  125/69    Pulse:  111                Electronically Signed By: MADDY Landin CRNA  December 28, 2019  10:49 AM  
[phone]

## 2019-12-28 NOTE — PROGRESS NOTES
Transfer  Transferred from: 7A  Via:bed  Reason for transfer:Pt appropriate for 6B-worsened patient condition  Family: Aware of transfer, accompanied by significant other   Belongings: Received with pt  Chart: Received with pt  Medications: Meds received from old unit with pt  2 RN Skin Assessment Completed By: Kelli RN & Dorothy Broderick RN  Report received from: BROOKE Alcala   Pt status:  /71 (BP Location: Right arm)   Pulse 116   Temp 98.2  F (36.8  C)   Resp 22   SpO2 97%

## 2019-12-28 NOTE — PROGRESS NOTES
Calorie Count  Intake recorded for: 12/27  Total Kcals: 0 Total Protein: 0g  Kcals from Hospital Food: 0   Protein: 0g  Kcals from Outside Food (average):0 Protein: 0g  # Meals Recorded: No meals ordered. No food intake recorded.  # Supplements Recorded: 0

## 2019-12-28 NOTE — PROGRESS NOTES
[facility], [facility] Progress Note - Hospitalist Service, Gold 4       Date of Admission:  12/24/2019  Assessment & Plan   Charanjit Ibarra is a 59 year old male admitted on 12/24/2019. He has a history of cirrhosis secondary to alcohol abuse now sober for 5 years, HCC, DM II and esophageal varices who was admitted to the hospital as a transfer from [facility] with worsening liver failure and spontaneous bacterial peritonitis.     Plan for today:  - Underwent ERCP today with GI with placement of gallbladder stent with drainage of pus   - Haldol X 1 PRN for agitation   - Currently requesting to leave, however does not have capacity, redirectable   - Albumin 5% 25 500 ml for persistent elevated lactate       # Decompensated cirrhosis, AMELIA and spontaneous bacterial peritonitis: Admitted to [facility] on 12/23 with spontaneous bacterial peritonitis and E coli bacteremia.  Put on Invanz and ciprofloxacin due to penicillin allergy. E coli bacteremia sensitive to Cipro from OSH. Due to bilirubin steadily worsening and increase in creatinine, patient was transferred to [facility] for further treatment as well as evaluation by GI for liver transplant. Of note he does have a history of hepatocellular carcinoma and has follow-up scheduled for January to address this. RUQ US w/ doppler 12/25 with patent doppler evaluation. On admission to [facility], patient started on albumin challenge, repeat paracentesis with 14K WBC and culture growing E coli. Antibiotic transitioned to ertapenem 12/26. S/p therapeutic paracentesis 12/27 with down trend in cell counts. Tbili continues to increase to 16 today. INR > 2, s/p vitamin K challenge. CT abdomen revealed protal vein thrombosis and possible cholecystitis as below. AFP level <1.5. Pt has significant abdominal pain which is improved today follow gallbladder stent placement as below. Lactate continue to be elevated.   MELD-Na score: 29 at 12/28/2019  2:58  AM  MELD score: 28 at 12/28/2019  2:58 AM  Calculated from:  Serum Creatinine: 1.06 mg/dL at 12/28/2019  2:58 AM  Serum Sodium: 134 mmol/L at 12/28/2019  2:58 AM  Total Bilirubin: 16.9 mg/dL at 12/28/2019  2:58 AM  INR(ratio): 2.47 at 12/28/2019  2:58 AM  Age: 59 years  - GI consulted and appreciate recommendations  - Additional albumin 5% 500 ml given for persistently elevated lactate   - Repeat Lactic acid 2000  - Continue ertapenem and micafungin    - Discontinue vancomycin given blood cultures negative    - Clear liquid diet   - Continue to hold PTA diuretics in setting of acute kidney injury, as well as PTA propranolol given SBP  - Continue home lactulose and rifaximin with RN driven lactulose protocol.  - Daily Meld labs  - Consult nutrition to optimize nutritional status  - Cardiology consult for pre-transplant optimization, recommended dubamine stress test to be completed once clinically stabilizes   - Social work consult to establish sobriety plan post transplant   - Will need CT chest to complete HCC staging     # Dilated gallbladder with concern for cholecystitis vs cholangitis   CT abdomen revealed distended gallbladder measuring up to 12.2 cm in length with possible gallbladder wall thickening. Unable to determine if this is secondary to large volume ascites. Patient with positive mearz's sign on exam with generalized abdominal pain into his back. Peritoneal fluid with e coli as above. Hemodynamically stable. HIDA positive for cholecystitis 12/26. WBC increased to max 26K, procalcitonin elevated at 9, and lactic acid increased. GI was consulted and patient underwent ERCP with placement of gallbladder stent today with drainage of pus. Gallbladder stent to remain in place indefinitely/until transplant.  Remains hemodynamically stable.   - Continue antibiotics as above   - Monitor LFTs, if signs of biliary obstruction would reverse coagulopathy and repeat ERCP     # Encephalopathy   Developed  encephalopathy following procedure today. Ammonia wnl. No focal defects appreciated on exam. Suspect secondary to medication effect, acute illness and underlying personality disorder. Received haldol X 1 for agitation.   - Quetiapine PRN for agitation    - EKG for baseline QTc   - Okay to discontinue continuous pulse ox    - Delirium precautions      # Portal vein thrombosis   Noted on CT abdomen 12/25. Portal venous hypertension with splenomegaly and splenic varices with branch portal vein thrombus in the posterior right portal vein.    - Discussed with GI, appreciate recommendations on need for AC will hold currently given need for procedure      # T2DM: Patient very unclear on how much insulin he is actually taking PTA.  Essentially records show he is likely on 15 units of glargine at bedtime and sliding scale insulin. A1C this am 6.8%. Blood glucose remains elevated >200. Glargine decreased due to NPO 12/26.  - Increase glargine from 10 units to 16 units HS  - Continue HSSI  - Accuchecks every 4 hours  - Hypoglycemic protocol     # Anemia, thrombocytopenia: Appears baseline hgb around 10. Hgb stable at baseline. Plt count appears chronically low with plt count this am at 70 likely at BL.  - Daily CBC     # Hypothyroidism: Continue home levothyroxine 50 mcg Qday  Diet: Calorie Counts  Snacks/Supplements Adult: Boost Plus; Between Meals  Clear Liquid Diet    DVT Prophylaxis: Pneumatic Compression Devices  Keith Catheter: not present  Code Status: Full Code      Disposition Plan   Expected discharge: 4 - 7 days, recommended to prior living arrangement vs TCU once adequate pain management/ tolerating PO medications, antibiotic plan established, mental status at baseline and safe disposition plan/ TCU bed available.  Entered: EDNA Bryson 12/28/2019, 1:25 PM       The patient's care was discussed with the Attending Physician, Dr. Michele, Bedside Nurse, Patient and patients significant other.    Malathi MOON  [facility]  Hospitalist Service, [address], [place]  Pager: [phone]  Please see sticky note for cross cover information  ______________________________________________________________________    Interval History   Patient with significant delirium following procedure today. Reports abdominal pain is much improved today. Denies fevers, chills, chest pain, SOB, dyspnea, nausea, vomiting, weakness, numbness, tingling.     Data reviewed today: I reviewed all medications, new labs and imaging results over the last 24 hours.     Physical Exam   Vital Signs: Temp: 98.2  F (36.8  C) Temp src: Oral BP: 134/72 Pulse: 108 Heart Rate: 106 Resp: 20 SpO2: 97 % O2 Device: Nasal cannula Oxygen Delivery: 2 LPM  Weight: 173 lbs 15.09 oz  GENERAL: Alert and oriented x 3. No acute distress, agitated. Making non nonsensical comments.   HEENT: Sclera icterus. PERRL. Mucous membranes dry.   CV: RRR. S1, S2. No murmurs appreciated.   RESPIRATORY: Effort normal on RA. Lungs CTAB with no wheezing, rales, rhonchi.   GI: Abdomen distended with very minimal tenderness at left lower quadrant otherwise non tender, no guarding or rebound tenderness. Bowel sounds present.   MUSCULOSKELETAL: No joint swelling or tenderness. Moves all extremities.   NEUROLOGICAL: No focal deficits.   EXTREMITIES: Trace bilateral peripheral edema. Intact bilateral pedal pulses.   SKIN: + jaundice. No rashes.     Data   Recent Labs   Lab 12/28/19  0258 12/27/19  1536 12/27/19  0613 12/26/19  0630   WBC 17.8* 23.9* 26.9* 16.4*   HGB 9.4* 10.4* 11.4* 10.4*   MCV 99 97 99 101*   PLT 68* 88* 116* 52*   INR 2.47*  --  2.22* 2.21*     --  129* 131*   POTASSIUM 3.8  --  3.9 4.9   CHLORIDE 104  --  100 102   CO2 20  --  19* 18*   BUN 46*  --  44* 36*   CR 1.06  --  1.21 1.11   ANIONGAP 10  --  10 11   TRA 9.5  --  10.1 9.4   *  --  237* 224*   ALBUMIN 4.2  --  4.3 3.7   PROTTOTAL 5.7*  --  6.5* 6.1*   BILITOTAL 16.9*  --   16.4* 14.6*   ALKPHOS 57  --  72 64   ALT 20  --  23 24   AST 27  --  32 61*     Medications     - MEDICATION INSTRUCTIONS -       - MEDICATION INSTRUCTIONS -       sodium chloride 75 mL/hr at 12/28/19 1622       calcium citrate-vitamin D  2 tablet Oral BID     ertapenem (INVanz) IV  1 g Intravenous Q24H     ferrous sulfate  325 mg Oral BID     insulin aspart  1-6 Units Subcutaneous Q4H     insulin glargine  16 Units Subcutaneous At Bedtime     lactulose  20 g Oral TID     levothyroxine  50 mcg Oral Daily     lidocaine  1-2 patch Transdermal Q24h    And     lidocaine   Transdermal Q8H     magnesium chloride  535 mg Oral Daily     micafungin  100 mg Intravenous Q24H     modafinil  100 mg Oral Daily     pantoprazole  40 mg Oral BID AC     rifaximin  550 mg Oral BID     sennosides  8.6 mg Oral BID     sodium chloride (PF)  3 mL Intracatheter Q8H     sodium chloride (PF)  3 mL Intracatheter Q8H     vancomycin (VANCOCIN) IV  1,750 mg Intravenous Q12H

## 2019-12-28 NOTE — PROVIDER NOTIFICATION
12/28/19 0300   Call Information   Date of Call 12/28/19   Time of Call 0333   Name of person requesting the team Roscoe   Title of person requesting team RN   RRT Arrival time 0334   Time RRT ended 0400   Reason for call   Type of RRT Adult   Primary reason for call Sepsis suspected   Sepsis Suspected Elevated Lactate level;Heart Rate > 100;WBC <4 or >12   Was patient transferred from the ED, ICU, or PACU within last 24 hours prior to RRT call? No   SBAR   Situation Latic Acid = 4.3   Background Admitted for Spontaneous Bacterial Peritonitis   Notable History/Conditions Cancer;Diabetes;Organ failure  (HCC, Laennec's Cirrhosis, AMELIA, )   Assessment Alert, Lactic Acid level has improved after IV Thiamine.  VSS except for Tachycardia.    Interventions No interventions   Patient Outcome   Patient Outcome Stabilized on unit   RRT Team   Attending/Primary/Covering Physician Orly Calderon MD   Lead RN Sherri Delatorre RN   RN Roscoe Hassan RN   RT Kristofer Rivera RT   Post RRT Intervention Assessment   Post RRT Assessment Stable/Improved   Date Follow Up Done 12/28/19   Time Follow Up Done 0710   Comments preparing for OR.  Behavior is more argumentative and impulsive.

## 2019-12-28 NOTE — PHARMACY-PHARMACOTHERAPY NOTE
Delirium Consult    Medications evaluated as requested per Delirium Consult.  No medication recommendations at this time.  Pharmacist will continue to follow as new medications are ordered.

## 2019-12-28 NOTE — PROVIDER NOTIFICATION
12/27/19 1300   Call Information   Date of Call 12/27/19   Time of Call 1240   Name of person requesting the team Lalo   Title of person requesting team RN   RRT Arrival time 1243   Time RRT ended 1300   Reason for call   Type of RRT Adult   Primary reason for call Sepsis suspected   Sepsis Suspected Elevated Lactate level;Heart Rate > 100;RR > 20, SaO2 <90% OR increasing O2 need;WBC <4 or >12   Was patient transferred from the ED, ICU, or PACU within last 24 hours prior to RRT call? No   SBAR   Situation LA 4.2, up from 3.5.   Background Pt admitted with SBP on 12/24   Notable History/Conditions Organ failure;Cancer   Assessment Pt is alert and oriented. Tacycardic with HR in 110's, afebrile, RR in 30's but does not appear to be in distress. Normotensive.   Interventions Fluid bolus;Meds  (albumin, adding on micafungin. Pt needs ERCP urgently.)   Patient Outcome   Patient Outcome Transferred to  (6B)   RRT Team   Attending/Primary/Covering Physician Gold 4   Date Attending Physician notified 12/27/19   Time Attending Physician notified 1245   Physician(s) EDNA Anna   Lead RN Rosie Lynch   Post RRT Intervention Assessment   Post RRT Assessment Stable/Improved   Date Follow Up Done 12/27/19   Time Follow Up Done 1530   Comments LA 3.8 post albumin bolus

## 2019-12-28 NOTE — PROGRESS NOTES
Sepsis Evaluation Progress Note    I was called to see Charanjit Ibarra due to elevated lactate. He is known to have an infection.     Physical Exam   Vital Signs:  Temp: 98.5  F (36.9  C) Temp src: Oral BP: 135/74 Pulse: 116 Heart Rate: 112 Resp: 22 SpO2: 95 % O2 Device: None (Room air)      Lab:  Lactic Acid   Date Value Ref Range Status   12/27/2019 4.6 (HH) 0.7 - 2.0 mmol/L Final     Comment:     Critical Value called to and read back by  MELISSA HERNANDEZ.ON 6B 12/27/19,1917 BY        Lactate for Sepsis Protocol   Date Value Ref Range Status   12/26/2019 2.9 (H) 0.7 - 2.0 mmol/L Final     Comment:     SIGNIFICANT VALUE NOTIFIED TO MARIAM REID RN 7A       The patient lucid in conversation, conversing appropriately and making jokes with staff.    The rest of their physical exam is significant for rotund, distended abdomen, minimally tender to palpation with fluid shift wave present, icteric sclerae, dehydrated with dry mucous membranes, slow skin turgor    Assessment & Plan   Charanjit Ibarra meets severe sepsis criteria with confirmed infection, tachycardia, worsening leukocytosis, rising lactate. He is currently on 2 antibiotics, 1 antifungal and we are going to add a one time dose of Albumin with 250ml of lactated ringers and plan to recheck lactate in 2h. If if continues to rise, we will need to contact our GI colleagues and discuss his ERCP for tomorrow and potential percutaneous placement overnight if needed.     Sepsis Time-Zero (time Sepsis diagnosis confirmed): Sepsis was confirmed at outside hospital and he was started on treatment even before arrival    Anti-infectives (From now, onward)    Start     Dose/Rate Route Frequency Ordered Stop    12/27/19 1615  vancomycin (FIRVANQ) oral solution 250 mg      250 mg Oral 4 TIMES DAILY 12/27/19 1601      12/27/19 1200  micafungin (MYCAMINE) 100 mg in sodium chloride 0.9 % 100 mL intermittent infusion      100 mg  100 mL/hr over 60 Minutes  Intravenous EVERY 24 HOURS 12/27/19 1150      12/27/19 0800  vancomycin (VANCOCIN) 1,750 mg in sodium chloride 0.9 % 500 mL intermittent infusion      1,750 mg  over 2 Hours Intravenous EVERY 12 HOURS 12/27/19 0755      12/25/19 2030  ertapenem (INVanz) 1 g vial to attach to  mL bag      1 g  over 30 Minutes Intravenous EVERY 24 HOURS 12/25/19 2018 12/24/19 2015  rifaximin (XIFAXAN) tablet 550 mg      550 mg Oral 2 TIMES DAILY 12/24/19 2008          Current antibiotic coverage is appropriate for source of infection.     Disposition: The patient will remain on the current unit. We will continue to monitor this patient closely.  EDNA Zepeda    Sepsis Criteria   Sepsis: 2+ SIRS criteria due to infection  Severe Sepsis: Sepsis AND 1+ new sign of acute organ dysfunction (Note: lactate >2 is organ dysfunction)  Septic Shock: Sepsis AND hypotension despite volume resuscitation with 30 ml/kg crystalloid

## 2019-12-29 NOTE — PLAN OF CARE
/72 (BP Location: Left arm)   Pulse 108   Temp 97.6  F (36.4  C) (Oral)   Resp 18   Wt 78.9 kg (173 lb 15.1 oz)   SpO2 96%   BMI 26.45 kg/m      Shift: 6031-4159   Reason for Admission: Worsening liver failure and spontaneous bacterial peritonitis  VS: VSS on RA- Sinus Tach to SR  Neuros: Disoriented to situation. Pt is illogical, incoherent, and rambles. Yellow sclera. On [place] Protocol- scoring 1, PRN Lactulose given x2  GI/: 6 loose stools this shift. Voiding adequately   Diet: CLD, poor appetite   Drains/Lines: 3 PIVs- NS running @ 75 mL/hr  Activity: A1 + GB to commode. BA on for safety  Pain/Nausea: C/o of chronic back pain and abdomen. Denies nausea  Respiratory: MD okay with pt being off pulse ox  Skin: Ascites, jaundice.   Labs: LA 4.0- MD pagecristy, albumin bolus given. Ammonia levels WNL.  Social: Wife at bedside   New this shift: ERCP done today- gallbladder stent placed- pt was delirious and agitated post procedure- PRN IV Haldol given.   Plan of care: Will continue to monitor and follow POC.

## 2019-12-29 NOTE — PROGRESS NOTES
Brief Medicine Crosscover Note      Contacted by RN regarding patient w/ -160, though asymptomatic. EKG ordered STAT w/ SVT and HR of 150. CBC, CMP, Mg, Phos, Troponin, CXR, and NT-BNP ordered. Assessed patient at bedside w/ Evening triage MD. Patient alerrt and oriented x3, complains of dyspnea, though no other localizing s/s. BP drop from 140's/80's to 90/60. Adenosine 6 mg IV x1 was administered. HR following administration ~ 117. O2 sats 95% on RA. Concern for hypervolemia vs infection precipitating SVT. Formal complete ECHO ordered for AM.    - CBC, CMP, Mg, Phos, Troponin, NT-BNP, LA  - Complete ECHO in am   - Stop IVF  - Tele  - Continue to closely monitor  - Patient signed out to Night Triage MD who will continue to follow    BP (!) 142/84 (BP Location: Right arm)   Pulse 108   Temp 98.1  F (36.7  C) (Oral)   Resp 20   Wt 78.9 kg (173 lb 15.1 oz)   SpO2 98%   BMI 26.45 kg/m       Marci Victoria PA-C  Hospitalist Service, [facility], [place]  Pager: [phone]

## 2019-12-29 NOTE — PLAN OF CARE
PT 6B:  PT eval deferred for today.  Met with pt at bedside, he is pulling off his gown and with irrelevant conversation.  Very distracted, not participating well.  Will reassess 12/30    Interdisciplinary Non-Pharmacological Management of Delirium:     General Supportive Measures: Ensure adequate hydration and nutrition. Schedule toileting. Appropriate assessment and treatment of pain.   Re-[place] Patient: Ensure clock has correct time and white board has correct date. Communicate clearly, providing explanations as appropriate. Encourage presence of family members for reassurance. Have family/caregiver bring in familiar objects/pictures.  Cognition: Engage in appropriate meaningful communication and activities with patient (current events discussion, word games, magazines, newspapers).   Sensory: Use eyeglasses, hearing aids, or voice amplifiers as appropriate.   Avoid Use of Physical Restraints as Appropriate: Indicate need and frequently re-assess duenas catheters and other tethers (cap IVs if medically appropriate).   Maintain Mobility and Self Care Ability: Avoid bedrest. Have patient up in chair for meals if appropriate.   Normalize Sleep-Wake Cycle: Discourage too much daytime napping (less than 30 minutes at a time), aim for uninterrupted periods of sleep at night.   Days: Keep room well light with lights on and shades open.   Night: Keep room quiet with low level lighting.   For Agitation: Avoid overstimulating environment, try music, massage, appropriate TV stations, and relaxation techniques. Take patient for a walk if appropriate, even if in the middle of the night.   Safety Concerns: Patients with delirium are at high risk for falls. Use bed and chair alarms along with frequent surveillance.

## 2019-12-29 NOTE — PROGRESS NOTES
[facility], [facility] Progress Note - Hospitalist Service, Gold 4       Date of Admission:  12/24/2019  Assessment & Plan    Charanjit Ibarra is a 59 year old male admitted on 12/24/2019. He has a history of cirrhosis secondary to alcohol abuse now sober for 5 years, HCC, DM II and esophageal varices who was admitted to the hospital as a transfer from [facility] with worsening liver failure and spontaneous bacterial peritonitis.    Plan for today:   - Increase lantus from 16 to 20 units nightly   - Add prandial coverage for meals, snacks and supplements at 1 unit per 15 gm CHO   - Increase correctional insulin to high sliding scale    - Complete Echocardiogram   - CRP in AM   - Discontinue micafungin    - Trazodone nightly for insomnia     # Decompensated cirrhosis, AMELIA and spontaneous bacterial peritonitis: Admitted to [facility] on 12/23 with spontaneous bacterial peritonitis and E coli bacteremia.  Put on Invanz and ciprofloxacin due to penicillin allergy. E coli bacteremia sensitive to Cipro from OSH. Due to bilirubin steadily worsening and increase in creatinine, patient was transferred to [facility] for further treatment as well as evaluation by GI for liver transplant. Of note he does have a history of hepatocellular carcinoma and has follow-up scheduled for January to address this. RUQ US w/ doppler 12/25 with patent doppler evaluation. On admission to [facility], patient started on albumin challenge, repeat paracentesis with 14K WBC and culture growing E coli. Antibiotic transitioned to ertapenem 12/26. S/p therapeutic paracentesis 12/27 with down trend in cell counts. Tbili continued to increase. INR > 2, s/p vitamin K challenge. CT abdomen 12/26 revealed protal vein thrombosis and possible cholecystitis as below. AFP level <1.5. Pt had significant abdominal pain which is now improving following gallbladder stent placement as below. Lactate continue to be elevated. Tbili down  trending after gallbladder stent placement. Vancomycin discontinued 12/28, micafungin discontinued today.   MELD-Na score: 29 at 12/29/2019  5:13 AM  MELD score: 29 at 12/29/2019  5:13 AM  Calculated from:  Serum Creatinine: 1.13 mg/dL at 12/29/2019  5:13 AM  Serum Sodium: 141 mmol/L (Rounded to 137 mmol/L) at 12/29/2019  5:13 AM  Total Bilirubin: 14.5 mg/dL at 12/29/2019  5:13 AM  INR(ratio): 2.84 at 12/29/2019  5:13 AM  Age: 59 years  - GI consulted and appreciate recommendations   - Hold on further IVF  - Continue ertapenem   - Discontinue micafungin    - Clear liquid diet with advancement to 2 gm Na diet as tolerated   - Continue to hold PTA diuretics in setting of acute kidney injury, as well as PTA propranolol given SBP  - Continue home lactulose and rifaximin with RN driven lactulose protocol.  - Daily Meld labs   - May require additional paracentesis in the next several days  - Consult nutrition to optimize nutritional status  - Cardiology consult for pre-transplant optimization, recommended dubamine stress test to be completed once clinically stabilizes   - Social work consult to establish sobriety plan post transplant   - Will need CT chest to complete HCC staging     # Cholecystitis vs cholangitis   CT abdomen revealed distended gallbladder measuring up to 12.2 cm in length with possible gallbladder wall thickening. Unable to determine if this is secondary to large volume ascites. Patient with positive meraz's sign on exam with generalized abdominal pain into his back. Peritoneal fluid with e coli as above. Hemodynamically stable. HIDA positive for cholecystitis 12/26. WBC increased to max 26K, procalcitonin elevated at 9, and lactic acid increased. GI was consulted and patient underwent ERCP with placement of gallbladder stent 12/28 with drainage of pus. Gallbladder stent to remain in place indefinitely/until transplant.  Remains hemodynamically stable. Tbili down trending, LFTs stable.   - Continue  antibiotics as above   - Monitor LFTs, if signs of biliary obstruction would reverse coagulopathy and repeat ERCP     # SVT, resolved   Noted to have increased HR >150 12/28 evening, EKG with SVT. S/p Adenosine 6 mg IV X 1 administered 12/28 evening. HR improved to ~117 following administration. O2 95% on RA. Mag, phos, troponin wnl. NP-BNP elevated to 1408. CBC remarkable for progressive anemia. Possible hypervolemia vs infection precipitating SVT. HR stable today 100-115.   - Echocardiogram today   - Continue Tele monitoring    - IVF discontinued      # Encephalopathy   Developed encephalopathy following procedure today. Ammonia wnl. No focal defects appreciated on exam. Suspect secondary to medication effect, acute illness and underlying personality disorder. Patient reports last alcohol use 5 years ago.   - Haldol PRN for agitation    - EKG for baseline QTc    - Continue thiamine 500 mg every 8 hours IV for 3 days   - Delirium precautions      # Portal vein thrombosis   Noted on CT abdomen 12/25. Portal venous hypertension with splenomegaly and splenic varices with branch portal vein thrombus in the posterior right portal vein.    - Discussed with GI, appreciate recommendations on need for AC will hold currently given need for procedure      # T2DM: Patient very unclear on how much insulin he is actually taking PTA.  Essentially records show he is likely on 15 units of glargine at bedtime and sliding scale insulin. A1C this am 6.8%. Blood glucose remains elevated >200. Glargine decreased due to NPO 12/26.  - Increase glargine from 10 units to 20 units HS  - Continue HSSI  - POC glucose QID (AC and HS)  - Hypoglycemic protocol     # Anemia, thrombocytopenia: Appears baseline hgb around 10 and plts around 70. Hgb slowly down trending slowly. No signs of active bleeding. Plts down to 47 today.   - Daily CBC with plts   - Monitor for signs of bleeding    - Transfuse if Hgb <7.0 or plts <10    - IVF discontinued       # Hypothyroidism: Continue home levothyroxine 50 mcg Qday      Diet: Calorie Counts  Snacks/Supplements Adult: Boost Plus; Between Meals  Clear Liquid Diet    DVT Prophylaxis: Pneumatic Compression Devices  Keith Catheter: not present  Code Status: Full Code      Disposition Plan   Expected discharge: 4 - 7 days, recommended to transitional care unit once adequate pain management/ tolerating PO medications, antibiotic plan established, mental status at baseline and safe disposition plan/ TCU bed available.  Entered: EDNA Bryson 12/29/2019, 8:23 AM       The patient's care was discussed with the Attending Physician, Dr. Michele, Bedside Nurse, Patient and hepatology Consultant.    EDNA Bryson  Hospitalist Service, [address], [place]  Pager: [phone]  Please see sticky note for cross cover information  ______________________________________________________________________    Interval History   Patient reports pain is slightly better in his abdomen. Remains oriented to person and place. Disoriented on date during my conversation. Continues to ramble about nonsensical things.     Denies SOB, chest pain, racing heart, fevers, chills, nausea, vomiting, numbness, tingling.     Data reviewed today: I reviewed all medications, new labs and imaging results over the last 24 hours.     Physical Exam   Vital Signs: Temp: 98.2  F (36.8  C) Temp src: Oral BP: 113/69 Pulse: 108 Heart Rate: 105 Resp: 18 SpO2: 95 % O2 Device: None (Room air) Oxygen Delivery: 2 LPM  Weight: 178 lbs 11.2 oz  GENERAL: Alert and oriented x 3. No acute distress. Making non sensical comments.   HEENT: Sclera icterus. PERRL. Mucous membranes dry.   CV: RRR. S1, S2. No murmurs appreciated.   RESPIRATORY: Effort normal on RA. Lungs CTAB with no wheezing, rales, rhonchi.   GI: Abdomen distended with very minimal tenderness at left lower quadrant otherwise non tender, no guarding or rebound  tenderness. Bowel sounds present.   MUSCULOSKELETAL: No joint swelling or tenderness. Moves all extremities.   NEUROLOGICAL: No focal deficits appreciated  EXTREMITIES: Trace bilateral peripheral edema. Intact bilateral pedal pulses.   SKIN: + jaundice. No rashes.     Data   Recent Labs   Lab 12/29/19  0513 12/28/19  2236 12/28/19  0258  12/27/19  0613   WBC 6.1 9.5 17.8*   < > 26.9*   HGB 7.5* 8.6* 9.4*   < > 11.4*   * 102* 99   < > 99   PLT 47* 57* 68*   < > 116*   INR 2.84*  --  2.47*  --  2.22*    140 134  --  129*   POTASSIUM 3.6 3.6 3.8  --  3.9   CHLORIDE 112* 109 104  --  100   CO2 19* 17* 20  --  19*   BUN 50* 45* 46*  --  44*   CR 1.13 1.08 1.06  --  1.21   ANIONGAP 10 13 10  --  10   TRA 10.0 9.8 9.5  --  10.1   * 305* 232*  --  237*   ALBUMIN 3.7 4.0 4.2  --  4.3   PROTTOTAL 5.5* 5.9* 5.7*  --  6.5*   BILITOTAL 14.5* 15.5* 16.9*  --  16.4*   ALKPHOS 55 61 57  --  72   ALT 21 19 20  --  23   AST 28 29 27  --  32   TROPI  --  <0.015  --   --   --     < > = values in this interval not displayed.     Medications     - MEDICATION INSTRUCTIONS -       - MEDICATION INSTRUCTIONS -         calcium citrate-vitamin D  2 tablet Oral BID     ertapenem (INVanz) IV  1 g Intravenous Q24H     ferrous sulfate  325 mg Oral BID     insulin aspart   Subcutaneous TID w/meals     insulin aspart  1-10 Units Subcutaneous TID AC     insulin aspart  1-7 Units Subcutaneous At Bedtime     insulin glargine  20 Units Subcutaneous At Bedtime     lactulose  20 g Oral TID     levothyroxine  50 mcg Oral Daily     lidocaine  1-2 patch Transdermal Q24h    And     lidocaine   Transdermal Q8H     magnesium chloride  535 mg Oral Daily     modafinil  100 mg Oral Daily     pantoprazole  40 mg Oral BID AC     rifaximin  550 mg Oral BID     sennosides  8.6 mg Oral BID     sodium chloride (PF)  3 mL Intracatheter Q8H     sodium chloride (PF)  3 mL Intracatheter Q8H     thiamine  500 mg Intravenous Q8H     traZODone  50 mg Oral  At Bedtime

## 2019-12-29 NOTE — PLAN OF CARE
Neuro: Pt. Oriented to self and place, will sometimes answer all orientation questions appropriately.  follows commands, incoherent speech, rambles. Agitated at times, Seroquel and haldol given. Pt. Restless and delirious throughout night, MDs aware. Sitter at bedside.     Cardiac: ST rates in 1teens. SBP stable, afebrile.    Respiratory: Sating >90's on RA  GI/: Adequate urine output. BM X3, loose incontinent stools. Abd distended and taut.   Diet/appetite: Tolerating Clear liq diet. Poor appetite.   Activity:  Assist of 1  Pain: At acceptable level on current regimen.   Skin: No new deficits noted.  LDA's: 3 PIV's     Plan: Continue with POC. Notify primary team with changes.

## 2019-12-29 NOTE — PROVIDER NOTIFICATION
Provider notified d/t pt HR's remaining in 150's. SBP's 90's/40's from 140's. Pt. Asymptomatic. EKG ordered. MD's at bedside. Float resource called. Adenosine 6mg IV ordered. IVF stopped.     Will continue to monitor.

## 2019-12-29 NOTE — PROGRESS NOTES
Calorie Count  Intake recorded for: 12/28  Total Kcals: 0 Total Protein: 0g  Kcals from Hospital Food: 0   Protein: 0g  Kcals from Outside Food (average):0 Protein: 0g  # Meals Recorded: 1 meal ordered. No food intake recorded.  # Supplements Recorded: 0

## 2019-12-29 NOTE — PROVIDER NOTIFICATION
12/28/19 2300   Call Information   Date of Call 12/28/19   Time of Call 2248   Name of person requesting the team Roscoe   Title of person requesting team RN   RRT Arrival time 2250   Time RRT ended 2300   Reason for call   Type of RRT Adult   Primary reason for call Sepsis suspected   Sepsis Suspected Elevated Lactate level;Heart Rate > 100   Was patient transferred from the ED, ICU, or PACU within last 24 hours prior to RRT call? Yes   SBAR   Situation Lactic Acid = 5.8   Background Admitted for Spontaneous Bacterial Peritonitis   Notable History/Conditions Cancer;Diabetes;Organ failure;Recent surgery   Assessment Alert, confused,  Patient was in SVT close to the time the Lactic Level was drawn.  Patient received Adenosine for SVT.  This cardiac episode may have contributed to the elevated level. No other signs or symptoms of sepsis.   Interventions Labs;Meds   Patient Outcome   Patient Outcome Stabilized on unit   RRT Team   Attending/Primary/Covering Physician Marci Aguiar RN Sherri Delatorre RN   RN Roscoe Hassan RN   RT Kristofer Rivera RT   Post RRT Intervention Assessment   Post RRT Assessment Stable/Improved   Date Follow Up Done 12/29/19   Time Follow Up Done 0300   Comments repeat Lactic Acid = 3.8

## 2019-12-30 NOTE — PROGRESS NOTES
[facility]    Hepatology Follow-up    CC:       SBP     Dx:        Decompensated EtOH cirrhosis               Culture positive SBP               E. Coli Bacteremia               Possible HCC    24 hour events: TTE to be completed     T Bili 13.2 (14.5)     Ascites fluid culture with E. Coli (although gram stain with many GPCs)     Subjective:    Patient denies fevers, sweats or chills. Minimal abdominal pain. Remains tangential with conversation.    Medications  Current Facility-Administered Medications   Medication Dose Route Frequency     calcium citrate-vitamin D  2 tablet Oral BID     ertapenem (INVanz) IV  1 g Intravenous Q24H     ferrous sulfate  325 mg Oral BID     lactulose  20 g Oral TID     levothyroxine  50 mcg Oral Daily     lidocaine  1-2 patch Transdermal Q24h    And     lidocaine   Transdermal Q8H     magnesium chloride  535 mg Oral Daily     modafinil  100 mg Oral Daily     pantoprazole  40 mg Oral BID AC     rifaximin  550 mg Oral BID     sennosides  8.6 mg Oral BID     sodium chloride (PF)  3 mL Intracatheter Q8H     sodium chloride (PF)  3 mL Intracatheter Q8H     thiamine  500 mg Intravenous Q8H     traZODone  50 mg Oral At Bedtime       Review of systems  A 10-point review of systems was negative, unless stated above    Examination  /76   Pulse 107   Temp 98.6  F (37  C) (Axillary)   Resp 16   Wt 82.3 kg (181 lb 7 oz)   SpO2 98%   BMI 27.59 kg/m      Intake/Output Summary (Last 24 hours) at 12/30/2019 1107  Last data filed at 12/30/2019 1040  Gross per 24 hour   Intake 821.67 ml   Output 1100 ml   Net -278.33 ml       General: Looks well, NAD, jaundiced  CVS: RRR  Resp: No increased work of breathing  Abdomen: Moderately distended, minimally tender to deep palpation  Extremities: No edema  Neuro: Alert, tangential speech, intermittently responding appropriately to quesionts  Skin: no rash  Psych: normal mood    Laboratory  Lab Results   Component Value  Date     12/30/2019    POTASSIUM 3.2 12/30/2019    CHLORIDE 114 12/30/2019    CO2 19 12/30/2019    BUN 45 12/30/2019    CR 0.98 12/30/2019       Lab Results   Component Value Date    BILITOTAL 13.2 12/30/2019    ALT 27 12/30/2019    AST 47 12/30/2019    ALKPHOS 72 12/30/2019       Lab Results   Component Value Date    WBC 7.4 12/30/2019    HGB 8.4 12/30/2019     12/30/2019    PLT 57 12/30/2019       Lab Results   Component Value Date    INR 2.49 12/30/2019       MELD-Na score: 26 at 12/30/2019  4:56 AM  MELD score: 26 at 12/30/2019  4:56 AM  Calculated from:  Serum Creatinine: 0.98 mg/dL (Rounded to 1 mg/dL) at 12/30/2019  4:56 AM  Serum Sodium: 144 mmol/L (Rounded to 137 mmol/L) at 12/30/2019  4:56 AM  Total Bilirubin: 13.2 mg/dL at 12/30/2019  4:56 AM  INR(ratio): 2.49 at 12/30/2019  4:56 AM  Age: 59 years       Radiology      Assessment  59 year old w/ EtOH use disorder and decompensated cirrhosis (varices, ascites, likely HCC) admitted for liver transplant evaluation.     Hospital course notable for culture-positive E. Coli secondary bacterial peritonitis 2/2 acute cholecystitis (HIDA without gallbladder uptake) s/p ERCP for transpapillary drainage of the gallbladder. Remains stable with gradual clinical improvement after gallbladder drainage.     Transplant evaluation on hold in setting of acute infections above.    MELD-Na = 26    Recommendations:  -- Continue IV antibiotics including E. Coli coverage (currently on ertapenem)  -- Follow up culture sensitivities  -- Continue to monitor daily MELD labs  -- Follow up TTE results    Patient seen and discussed with Dr. Leventhal.    America Parnell MD  GI Fellow, PGY-5  P: [phone]

## 2019-12-30 NOTE — PROVIDER NOTIFICATION
Notified provider concerning patient showing no improvements in his blood sugars as they remain in 300-400's. Plan to initiate insulin gtt.     Will continue to monitor.

## 2019-12-30 NOTE — PLAN OF CARE
Discharge Planner OT   Patient plan for discharge: not discussed  Current status: CGA for sit>stand. Pt ambulating with close CGA, FWW, and wheelchair follow in hallway for 350 feet, needing 1 seated rest break. Little success performing standing ADLs this session due to confusion. Nonsensical conversation throughout, very distractible, somewhat erratic during movement and unsteady. Sometimes difficult to redirect, appeared to be hallucinating throughout. HR 130s with activity  Barriers to return to prior living situation: Decreased ADL independence and activity tolerance  Recommendations for discharge: TCU  Rationale for recommendations: Increase functional endurance and ADL independence at rehab       Entered by: Oscar Pink 12/30/2019 2:58 PM

## 2019-12-30 NOTE — PROVIDER NOTIFICATION
Notified Gold crosscover of HS BG of 423. Total of 10 units Novolog given and administered.  Will continue to monitor.

## 2019-12-30 NOTE — PROGRESS NOTES
[facility], [facility] Progress Note - Hospitalist Service, Gold 4       Date of Admission:  12/24/2019  Assessment & Plan   Charanjit Ibarra is a 59 year old male admitted on 12/24/2019. He has a history of cirrhosis secondary to alcohol abuse now sober for 5 years, HCC, DM II and esophageal varices who was admitted to the hospital as a transfer from [facility] with worsening liver failure and spontaneous bacterial peritonitis and E.coli blood stream infection along with cholecystitis and portal vein thrombosis. Paracentesis after admission revealed 14K WBC and E.coli and antibiotics were transitioned to Ertapenem. He was also found to have cholecystisis and underwent gallbladder stent placement and ERCP on 12/28/19. His bilirubin levels are improving, his WBC is improved, but now he is struggling with worsening delirium, poor sleep and ongoing peritoneal fluid collections.        1. Delirium, multifactorial, secondary to liver disease, poor sleep, recent sepsis   -Upon admission, he would occasionally say things that were tangential and delusion, but now this is all he says and constantly. Concerned about progressive delirium in the setting of his other critical illnesses improving. His symptoms, along with a shared room are keeping him from sleeping at all and he talks a lot about how he wishes he could sleep, but can't.   -Psychiatry consult for recommendations on best management of worsening delirium and insomnia  -Stop Haldol prn and use Zyprexa 5-10mg at bedtime for nausea, sleep and agitation  - QTc 406 as of 12/24/19. Will continue to intermittently monitor.   - Continue thiamine 500 mg every 8 hours IV for 3 days     2. Decompensated cirrhosis, MELD 29  3. E.coli spontaneous bacterial peritonitis s/p paracentesis (12/27/19)  4. E.coli blood stream infection with sepsis  5. Anemia, thrombocytopenia secondary to #2  - He would like to be considered for a liver  transplant and reportedly stopped drinking 5 years ago. GI consulted and appreciate recommendations   - Cardiology consult for pre-transplant optimization, recommended dubamine stress test to be completed once clinically stabilizes   - Procedure service consult for additional paracentesis today or tomorrow. Will plan to do both a therapeutic and diagnostic para.   - Continue ertapenem   - Continue regular diet with 2gm sodium restriction  - Home regimen of Lasix 40mg in the morning, 20mg in the evening, Spirinolactone 25mg once daily have been on hold secondary to recent kidney injury. We will plan to slowly restart this medications over the next 24-48hr as BP tolerates.  - Continue home lactulose and rifaximin with RN driven lactulose protocol.  - Daily Meld labs     6. Cholecystitis s/p gallbladder stent placement and ERCP (12/28/19)  - Continue antibiotics as above   - Monitor LFTs daily, if signs of biliary obstruction would reverse coagulopathy and repeat ERCP      7. Sinus tachycardia  8. Recent episode of SVT, resolved   -He received Adenosine on 12/28/19 secondary to an episode of SVT and has returned to sinus rhythm since his stent was placed and bilirubin has slowly improved. Tachycardia was likely worsened by his fluid status at the time as well.   -Continue telemetry    9. Reported HCC  -Diagnosis has not been confirmed and he has not bee staged. He had a suspicious liver lesion on a prior ultrasound concerning for HCC, however, his ultrasound upon admission revealed this area was difficult to see. Gastroenterology  - Will need CT chest to complete HCC staging which is scheduled for 1/6/19 pending clinical improvement.     10. Protein calorie malnutrition secondary to liver disease and potentially malignancy  -Nutrition has been consulted to provide ongoing recommendations on how to improve his oral intake and nutrition. He currently has supplements at meals, is on a low sodium diet and was able to  "advance to a regular diet.        11.  Portal vein thrombosis, not on anticoagulation  Noted on CT abdomen 12/25. Portal venous hypertension with splenomegaly and splenic varices with branch portal vein thrombus in the posterior right portal vein.    - Discussed with GI, appreciate recommendations on need for AC will hold currently given need for procedures intermittently.     12. T2DM, poorly controlled, hemoglobin A1c  -After BS reached the 400's overnight, he was started on IV insulin and now BS are consistently <200. Endocrinology has been consulted to help with recommendations for inpatient and outpatient recommendations of ongoing diabetic care. Due to his waxing/waning delirium, he will require long term care after discharge to help with diabetic care.   -Continue insulin drip for now      13.Hypothyroidism  -TSH 0.66 as of 12/29/19.  Continue home levothyroxine 50 mcg Qday.    Diet: Snacks/Supplements Adult: Boost Plus; Between Meals  Advance Diet as Tolerated: Regular Diet Adult; Regular Diet Adult; 2 gm NA Diet    DVT Prophylaxis: naturally anticoagulated from thrombocytopenia  Keith Catheter: not present  Code Status: Full Code      Disposition Plan   Expected discharge: 4 - 7 days, recommended to transitional care unit once     Entered: EDNA Zepeda 12/30/2019, 12:52 PM       EDNA Zepeda  Hospitalist Service, [address], [place]  Pager: [phone]  Please see sticky note for cross cover information  ______________________________________________________________________    Interval History   Mr. Ibarra was resting in bed today talking ongoing in circles without making any understandable sentences. When I introduced myself, he was able to say, \"hello\" but then returned to [facility]. 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  As I procede to do a neuro exam, he is able to perform a few tasks " like counting to ten, repeating back words, but otherwise continues to have tangential, delusional cycles of work speak that have developed since admission despite improving infection.    Data reviewed today: I reviewed all medications, new labs and imaging results over the last 24 hours.    Physical Exam   Vital Signs: Temp: 98.1  F (36.7  C) Temp src: Axillary BP: 139/79 Pulse: 107 Heart Rate: 98 Resp: 16 SpO2: 97 % O2 Device: None (Room air)    Weight: 181 lbs 7.02 oz    General: 60yo male sitting up in bed, talking in circles, reports some mild abdominal pain  Cardiac: tachycardia, regular rhythm, no appreciable murmurs, rubs or gallops  Lungs: breathing comfortably on room air, no adventitious sounds to bilateral auscultation  Abdomen: very distended, fluid shift wave present, minimally tender to palpation throughout, ecchymosis present over lower abdominal quadrants due to recent insulin injections  Psych: not agitated or anxious but continues to talk in tangential circles, often with delusions about working with the [employer] and [employer] currently. At one point, he heard the bubbling from a fellow patient's CPAP and thought it was raining.   Skin: warm, dry, scattered ecchymosis over bilateral upper and lower extremities    Data   Recent Labs   Lab 12/30/19  0456 12/30/19  0101 12/29/19  0513 12/28/19  2236 12/28/19  0258   WBC 7.4  --  6.1 9.5 17.8*   HGB 8.4*  --  7.5* 8.6* 9.4*   *  --  102* 102* 99   PLT 57*  --  47* 57* 68*   INR 2.49*  --  2.84*  --  2.47*    142 141 140 134   POTASSIUM 3.2* 3.6 3.6 3.6 3.8   CHLORIDE 114* 114* 112* 109 104   CO2 19* 19* 19* 17* 20   BUN 45* 47* 50* 45* 46*   CR 0.98 1.03 1.13 1.08 1.06   ANIONGAP 11 9 10 13 10   TRA 10.6* 10.4* 10.0 9.8 9.5   * 400* 298* 305* 232*   ALBUMIN 3.9  --  3.7 4.0 4.2   PROTTOTAL 6.2*  --  5.5* 5.9* 5.7*   BILITOTAL 13.2*  --  14.5* 15.5* 16.9*   ALKPHOS 72  --  55 61 57   ALT 27  --  21 19 20   AST 47*  --  28 29 27   TROPI  --    --   --  <0.015  --

## 2019-12-30 NOTE — PLAN OF CARE
6B PT - PT to hold evaluation. Cognition continues to be the largest barrier to safe functional mobility. Pt's needs being met by IP OT. Will follow from a periphery.

## 2019-12-30 NOTE — PROVIDER NOTIFICATION
Paged crosscover regarding patient unable to sleep past few nights. Pt.very restless and intermittently agitated. No change after scheduled trazodone and PRN melatonin given.  MD order: 2mg PO haldol given   Will continue to monitor.

## 2019-12-30 NOTE — PROGRESS NOTES
Calorie Count  Intake recorded for: 12/29  Total Kcals: 753 Total Protein: 15g  Kcals from Hospital Food: 753  Protein: 15g  Kcals from Outside Food (average):0 Protein: 0g  # Meals Recorded: 2 meals (First - 100% 2 fruit ice, applesauce, mashed potatoes w/ gravy, less than 25% roast turkey w/ gravy)      (Second - 100% banana, 75% cristel food cake w/ strawberries, 50% cheese pizza, 25% mashed potatoes w/ gravy, pudding)  # Supplements Recorded: 0

## 2019-12-30 NOTE — CONSULTS
Diabetes/Hyperglycemia Management Consult    Chief Complaint end stage liver disease, critically ill from [facility] with horrible blood sugars, currently on insulin drip. Please manage blood sugars while hospitalized and provide recs at discharge for home.  Consult requested by: Dawna Cameron PA-C  History of Present Illness Charanjit Ibarra is a 59 year old male with history of type 2 diabetes, hypothyroidism, cirrhosis 2/2 alcohol abuse ( sober 5 years), anemia and thrombocytopenia, hepatocellular carcinoma (HCC), esophageal varices transfered from OSH and  admitted on (12/24/2019) with worsening liver failure and spontaneous bacterial peritonitis.    Information was obtained from review of medical records and interview with patient. Mr. Ibarra speech is extremely difficult to understand plus has encephalopathy.    Hospital course complicated by cholecystitis vs cholangitis, underwent ECP with placement of gallbladder stent (12/28/2019 and drainage of pus), SVT now resolved, encephalopathy    Unknown when Mr. Ashford was dx with type 2 diabetes. Per chart review was on lantus 15 units daily along with sliding scale insulin. It is unclear if he was taking his lantus, but Mr. Ashford did report that he was not testing his blood sugars. Endorses diabetes throughout his family.    Glucose on presentation > 200. Was continued on lantus 15 units and sliding scale insulin. Glucose continued to be in the 200's despite increase in basal insulin. Glucose spiked to > 400 on 12/29/2019. IV insulin was ordered.  IV rates 3-8.5 units/hour. Glucose moderately controlled.  Glucose spiked from 196 at 4 units/hour to 230 after eating ( ate 25% oatmeal with brown sugar and orange juice). IV insulin rates went from 4 units/hour to 8.5 units/hour.      Currently, denies fever, chills, chest pain, shortness of breath, nausea and or vomiting. Endorses some abdominal pain and + ascities    Recent Labs   Lab 12/30/19  0753 12/30/19  0657  12/30/19  0559 12/30/19  0505 12/30/19  0456 12/30/19  0407 12/30/19  0302  12/30/19  0101  12/29/19  0513  12/28/19  2236  12/28/19  0258  12/27/19  0613   GLC  --   --   --   --  206*  --   --   --  400*  --  298*  --  305*  --  232*  --  237*   * 176* 178* 201*  --  183* 341*   < >  --    < >  --    < >  --    < >  --    < >  --     < > = values in this interval not displayed.         Diabetes Type:   Type 2 diabetic  Diabetes Duration: unknown  Usual Diabetes Regimen:   -per chart review lantus versus basaglar 15 units at bedtime  - sliding scale insulin three times per day before meals ( scale found in clinic note 4/24/2019)  Glucose 150-180, gives 1 unit of insulin  Glucose 181-200: gives 2 units of insulin  Glucose : gives 3 units of insulin  Ability to [place] Prescribed Regimen: unknown  Diabetes Control:   AIC 5.6% on (11/12/2019)  Lab Results   Component Value Date    A1C 6.8 12/25/2019     Diabetes Complications: unknown  History of DKA: unknown  Able to Detect Hypoglycemia: unknown  Usual Diabetes Care Provider: [facility]. Venancio Rodriguez at [facility]  Factors Impacting Glucose Control: ESLD, oral intake, infection      Review of Systems  10 point ROS completed with pertinent positives and negatives noted in the HPI    Past medical, family and social histories are reviewed and updated.    Past Medical History  Past Medical History:   Diagnosis Date     Alcoholic cirrhosis (H)      Ascites      DJD (degenerative joint disease)      Hypothyroid      Long term (current) use of opiate analgesic        Family History  Family History   Problem Relation Age of Onset     Diabetes Type 2  Father      Hypothyroidism Sister      Hypothyroidism Brother        Social History  Social History     Socioeconomic History     Marital status:      Spouse name: None     Number of children: None     Years of education: None     Highest education level: None   Occupational History     None  "  Social Needs     Financial resource strain: None     Food insecurity:     Worry: None     Inability: None     Transportation needs:     Medical: None     Non-medical: None   Tobacco Use     Smoking status: Former Smoker     Types: Cigarettes     Start date: 12/19/1977     Smokeless tobacco: Never Used     Tobacco comment: \"smoked for a few months at age 16\"   Substance and Sexual Activity     Alcohol use: No     Comment: Aug. 2016 (pt unsure of date)     Drug use: No     Sexual activity: None   Lifestyle     Physical activity:     Days per week: None     Minutes per session: None     Stress: None   Relationships     Social connections:     Talks on phone: None     Gets together: None     Attends Latter day service: None     Active member of club or organization: None     Attends meetings of clubs or organizations: None     Relationship status: None     Intimate partner violence:     Fear of current or ex partner: None     Emotionally abused: None     Physically abused: None     Forced sexual activity: None   Other Topics Concern     None   Social History Narrative     None         Physical Exam  /79   Pulse 107   Temp 98.1  F (36.7  C) (Axillary)   Resp 16   Wt 82.3 kg (181 lb 7 oz)   SpO2 97%   BMI 27.59 kg/m      General:  pleasant  resting in bed, in no distress.   HEENT:  PER and icteric, oral mucous membranes moist.   Lungs:  Non-labored  ABD: distended + ascities  Skin: warm and dry,generalized jaundiace  MSK:  Moving all extremities  Lymp:  no LE edema   Mental status:  alert, oriented to self, garbled speech       Laboratory  Recent Labs   Lab Test 12/30/19  0456 12/30/19  0101    142   POTASSIUM 3.2* 3.6   CHLORIDE 114* 114*   CO2 19* 19*   ANIONGAP 11 9   * 400*   BUN 45* 47*   CR 0.98 1.03   TRA 10.6* 10.4*     CBC RESULTS:   Recent Labs   Lab Test 12/30/19  0456   WBC 7.4   RBC 2.46*   HGB 8.4*   HCT 25.5*   *   MCH 34.1*   MCHC 32.9   RDW 18.1*   PLT 57*       Liver " Function Studies -   Recent Labs   Lab Test 12/30/19  0456   PROTTOTAL 6.2*   ALBUMIN 3.9   BILITOTAL 13.2*   ALKPHOS 72   AST 47*   ALT 27       Active Medications  Current Facility-Administered Medications   Medication     benzonatate (TESSALON) capsule 100 mg     benztropine (COGENTIN) tablet 1-2 mg     calcium carbonate (TUMS) chewable tablet 500-1,000 mg     calcium citrate-vitamin D (CITRACAL) 315-250 MG-UNIT per tablet 2 tablet     cyclobenzaprine (FLEXERIL) tablet 10 mg     Daily 2 GRAM acetaminophen limit, unless fulminent liver failure or transaminases greater than or equal to 300 - 400, then none     glucose gel 15-30 g    Or     dextrose 50 % injection 25-50 mL    Or     glucagon injection 1 mg     glucose gel 15-30 g    Or     dextrose 50 % injection 25-50 mL    Or     glucagon injection 1 mg     ertapenem (INVanz) 1 g vial to attach to  mL bag     ferrous sulfate (FEROSUL) tablet 325 mg     haloperidol lactate (HALDOL) injection 2 mg     HYDROmorphone (PF) (DILAUDID) injection 0.3-0.5 mg     insulin 1 unit/mL in saline (NovoLIN, HumuLIN Regular) drip - ADULT IV Infusion     [Held by provider] insulin aspart (NovoLOG) inj (RAPID ACTING)     insulin aspart (NovoLOG) inj (RAPID ACTING)     insulin aspart (NovoLOG) inj (RAPID ACTING)     [Held by provider] insulin aspart (NovoLOG) inj (RAPID ACTING)     [Held by provider] insulin glargine (LANTUS PEN) injection 20 Units     lactulose (CHRONULAC) solution 20 g    Or     lactulose (CHRONULAC) solution for enema prep 100 g     lactulose (CHRONULAC) solution 20 g     levothyroxine (SYNTHROID/LEVOTHROID) tablet 50 mcg     Lidocaine (LIDOCARE) 4 % Patch 1-2 patch    And     lidocaine patch in PLACE     lidocaine (LMX4) cream     lidocaine (LMX4) cream     lidocaine 1 % 0.1-1 mL     lidocaine 1 % 0.1-1 mL     magnesium chloride CR tablet 535 mg     magnesium hydroxide (MILK OF MAGNESIA) suspension 30 mL     May continue current IV fluid if patient has IV  fluids infusing until discharge.     melatonin tablet 1 mg     modafinil (PROVIGIL) tablet 100 mg     naloxone (NARCAN) injection 0.1-0.4 mg     oxyCODONE (ROXICODONE) tablet 5 mg     pantoprazole (PROTONIX) EC tablet 40 mg     polyethylene glycol (MIRALAX/GLYCOLAX) Packet 17 g     propranolol (INDERAL) tablet 10 mg     rifaximin (XIFAXAN) tablet 550 mg     senna-docusate (SENOKOT-S/PERICOLACE) 8.6-50 MG per tablet 1 tablet    Or     senna-docusate (SENOKOT-S/PERICOLACE) 8.6-50 MG per tablet 2 tablet     sennosides (SENOKOT) tablet 8.6 mg     sodium chloride (PF) 0.9% PF flush 10 mL     sodium chloride (PF) 0.9% PF flush 3 mL     sodium chloride (PF) 0.9% PF flush 3 mL     sodium chloride (PF) 0.9% PF flush 3 mL     sodium chloride (PF) 0.9% PF flush 3 mL     sodium chloride (PF) 0.9% PF flush 3 mL     sodium chloride 0.9% infusion     thiamine (B-1) 500 mg in sodium chloride 0.9 % 50 mL intermittent infusion     traZODone (DESYREL) tablet 50 mg     No current outpatient medications on file.       Current Diet  Orders Placed This Encounter      Advance Diet as Tolerated: Regular Diet Adult; Regular Diet Adult; 2 gm NA Diet        Assessment: Charanjit Ibarra is a 59 year old male with history of type 2 diabetes, hypothyroidism, cirrhosis 2/2 alcohol abuse ( sober 5 years), anemia and thrombocytopenia, hepatocellular carcinoma (HCC), esophageal varices transfered from [facility] and  admitted on (12/24/2019) with worsening liver failure and spontaneous bacterial peritonitis.    Type 2 diabetic: A1C is not an accurate measure in ESLD and anemia. A1C 6.8% with HGB 9.5 on (12/25/2019)  -suspect if the insulin was given in the adomen, it was not absorbing 2/2 ascites  -will need to give the insulin in either the arms or thighs.    Plan  - continue with IV insulin for now  -add Novolog: prandial insulin 1 unit per 5 grams of CHO for meals/sacks/supplements  - most likely will require assistance with diabetes management after  discharge  -patient care order to given insulin injections in either the arms or legs only   -will continue to follow    Joan Ramsey, [phone]  Diabetes Management Team job code: [id number]    I spent a total of 80 minutes bedside and on the inpatient unit managing the glycemic care of Charanjit Ibarra. Over 50% of my time on the unit was spent counseling the patient  and/or coordinating care regarding .  See note for details.   The patient is a 5y7m Male complaining of finger pain/injury.

## 2019-12-30 NOTE — PROGRESS NOTES
[facility] General ID Service: Follow Up Note      Patient:  Charanjit Ibarra   Date of birth 1960, Medical record number [id number]  Date of Visit:  12/30/2019  Date of Admission: 12/24/2019         Assessment and Recommendations:   ID Problem List:  1. SBP  2. Cholecystitis  3. Decompensated ETOH cirrhosis, sober 3-4 yrs  4. Leukocytosis  5. AMELIA   6. Thrombocytopenia  7. Chronic pain due to DJD, managed at pain clinic      Recommendations:  - Stop ertapenem  - Start Ceftriaxone 2g IV q24 hours  - Start Flagyl 500mg IV q8h    Discussion:  This is a 59 year old male with history significant for cirrhosis secondary to alcohol abuse, possible HCC, and DJD who initially presented to [facility] ([place], [place]) on 12/22 with abdominal pain. He was found to have SBP with ascites, paracentesis completed and culture of ascites fluid positive for pan-sensitive E.coli. Also found to have E.coli bacteremia (first/last positive 12/22). First negative appears to be 12/25 at [facility]. He was started on ciprofloxacin an ertapenem. Developed progressive abdominal distension, pain, and increase in bilirubin (5.4 to 16.9) with WBC also increasing to peak of 26.9 (12/27). CT with gall bladder sludge and wall thickening. HIDA findings consistent with cholecystitis, however study may be affected by underlying hepatocellular dysfunction. Underwent ERCP on 12/28/19 with obstructed gallbladder and cystic duct stent placed with drainage of pus.     At this point, would recommend narrowing to cover pan-sensitive E.coli SBP and bacteremia. Given ERCP findings, also recommend addition of Flagyl to cover intra-abdominal anaerobes. Continue to monitor mental status. Inflammatory markers down-trending and cholecystitis vs cholangitis appears to be the primary source of infection.      I have seen this patient in a joint visit with Dr. Ching. Recs were discussed with primary team today. Don't hesitate to call with questions.         I have  reviewed today's vital signs, medications, labs and imaging.  Nabila Gupta PA-C, Pager # [phone]            Interval History:       Reports abdominal pain, possibly improving. This morning making nonsensical statements and difficulty following conversation. Repeatedly asking to use the bathroom but will not answer when aide asks if he wants to use the commode, urinal, or bathroom. States he wants to go home and doesn't think he needs to be in the hospital         Review of Systems:   Unable to complete due to limited patient participation.          Current Antimicrobials   Current:  Ertapenem (start 12/25)  Rifaximin (start 12/24)    Prior:  Ciprofloxacin (12/24-12/25)  Micafungin (12/27-12-28)  Vancomycin (12/27-12/28)         Physical Exam:   Ranges for vital signs:  Temp:  [97.5  F (36.4  C)-98.6  F (37  C)] 98.6  F (37  C)  Pulse:  [107] 107  Heart Rate:  [104-113] 107  Resp:  [16-20] 16  BP: (116-134)/(69-76) 125/76  SpO2:  [96 %-98 %] 98 %    Intake/Output Summary (Last 24 hours) at 12/30/2019 1045  Last data filed at 12/30/2019 1040  Gross per 24 hour   Intake 821.67 ml   Output 1100 ml   Net -278.33 ml     Exam:  GENERAL:  Alert, changes subject frequency in conversation and makes some nonsensical statements/rambling in conversation. Jaundiced.   ENT:  Head is normocephalic, atraumatic. Oropharynx is moist without exudates or ulcers.  EYES:  Eyes have icteric sclerae.    NECK:  Supple.  LUNGS:  Clear to auscultation.  CARDIOVASCULAR:  Regular rate and rhythm with no murmurs, gallops or rubs.  ABDOMEN:  +distended with fluid wave, minimally tender to palpitation throughout. + bowel sounds.   EXT: Extremities warm and without edema.  SKIN:  No acute rashes.  Line is in place without any surrounding erythema. +scattered ecchymosis on limbs.  NEUROLOGIC:  Alert. Moves all extremitites.          Laboratory Data:   Reviewed.  Pertinent for:     12/27/19 C.diff toxin B PCR: negative    Culture data:  12/30/19  Peritoneal fluid aerobic culture: PENDING  12/27/19 Peritoneal fluid anaerobic culture: NGTD (preliminary)  12/27/19 Peritoneal fluid aerobic culture: NGTD (preliminary)  12/27/19 Blood culture x2 NGTD (preliminary)  12/26/19 Blood culture x2 NGTD (preliminary)  12/25/19 Peritoneal fluid: E.coli (intermediate: Levofloxacin)  12/25/19 Blood culture x2: No growth     12/25/2019 17:00 12/27/2019 10:00   Body Fluid Analysis Source Ascites Ascites   Color Fluid Orange Orange   Appearance Fluid Cloudy Cloudy   WBC Fluid 13493 4137   % Lymphocytes Fluid 1 1   % Mono/Macro Fluid 15    % Neutrophils Fluid 84 90   % Other Cells Fluid  9   Albumin Fluid 1.4    Bilirubin Fluid 3.8    Glucose Fluid 181    Glucose Fluid Source Ascites    Lactate Dehydrogenase Fluid 654    LD Fluid Source Ascites    Protein Total Fluid 2.5    Protein Total Fluid Source Ascites               Imaging:   HIDA (12/26/19)  Impression:  1. Nonvisualization gallbladder is consistent in the proper clinical  setting with cholecystitis.  2. Underlying hepatocellular dysfunction with reduction of clearance  of the radionuclide from the blood pool.    Ct abdomen & pelvis w/contrast (12/25/19)  Per radiologist report  IMPRESSION:  1. Changes from chronic liver disease with cirrhotic and macronodular  liver. Portal venous hypertension with splenomegaly and splenic  varices. Branch portal vein thrombus in the posterior right portal  vein. There are also varices in the anterior abdominal wall.  2. Large volume simple ascites in all 4 quadrants. In the pelvis there  is a small amount of enhancement about the simple appearing fluid  indicating some loculated component. Findings consistent with recent  ascites fluid analysis suggesting SBP.  3. Distended gallbladder measuring up to 12.2 cm in length. Possible  gallbladder wall thickening although this may be secondary to large  volume ascites. Ultrasound and CT performed at outside hospital  12/23/2019 also showed  distended gallbladder with sludge and stones.  Unable to rule out acute cholecystitis.  4. Some loops of bowel within the lower anterior abdomen demonstrate a  slightly thickened appearance which may be reactive to the ascites. No  evidence for bowel obstruction  5. Anterior abdominal wall hernia with ascitic fluid in the anterior  abdominal wall. There is also a right inguinal canal hernia with  fluid.

## 2019-12-30 NOTE — PLAN OF CARE
"Neuro: Oriented to self and date. PRN lactulose givenx1. Continuous to ramble throughout night making nonsensical sentences. Mentions of \"gas leak\" from past experience, explains to RN and sitter that \"my tele box wires are going to burn me, I know because I was an \". Sitter at bedside d/t pulling at lines/tele box and impulsively wanting to get out of bed. MD made aware pt unable to sleep and restless, mentioned new scheduled trazodone and melatonin had minimal to no effect in patient's sleep pattern. Haldol 2mg PO one time dose given with minimal effect at this time. Pt intermittently would doze off only to continue rambling. Crosscover to discuss with day team to try Zyprexa. Pleasant and redirectable.   Cardiac: ST with rates in 1-teens. VSS. Afebrile.    Respiratory: Spot checking O2, pt remains on RA with sats > 90%. No shortness of breath reported or observed  GI/: Adequate urine output. BM X3  Diet/appetite: 2G NA, poor diet. Abd distended and taut.   Activity:  Assist of 1 up to commode.   Pain: At acceptable level on current regimen.   Skin: No new deficits noted.  LDA's: PIV's. Insulin gtt initiated, ALG2    Plan: Continue with POC. Notify primary team with changes.    "

## 2019-12-30 NOTE — PLAN OF CARE
Neuro: Intermittently able to answer orientation questions, but also rambling illogical statements and stories at a continuous pace. Some hallucinations of objects in his hands. Occasionally following commands   Cardiac: ST, VSS. EKG complete today   Respiratory: Sating 98 on RA.  GI/: Voiding and stool mixed in commode. Multiple BM today, giving scheduled lactulose   Diet/appetite: 2gm sodium diet. Going OK. Might need a swallow eval, does not do well with straws.   Activity:  Assist of 1-2, up to chair and commode  Pain: At acceptable level on current regimen. Staying away from opiods this shift due to mental status  Skin: No new deficits noted.  LDA's: R PIV x2, thiamine infusions intermittently     Plan: trying trazodone and meletonin to get on a better sleep cycle

## 2019-12-30 NOTE — PROGRESS NOTES
[facility]    Hepatology Follow-up    CC:       SBP     Dx:        Decompensated EtOH cirrhosis               Culture positive SBP               E. Coli Bacteremia               Possible HCC    24 hour events: Successful placement of transpapilary drain     T Bili Improving 16.9 ->> 14.5     WBC Improved 17.8 ->> 6.1     SVT overnight improved s/p adenosine, TTE ordered    Subjective:    Abdominal pain improved. Patient denies fevers, sweats or chills. Remains tangential.    Medications  Current Facility-Administered Medications   Medication Dose Route Frequency     calcium citrate-vitamin D  2 tablet Oral BID     ertapenem (INVanz) IV  1 g Intravenous Q24H     ferrous sulfate  325 mg Oral BID     insulin aspart   Subcutaneous TID w/meals     insulin aspart  1-10 Units Subcutaneous TID AC     insulin aspart  1-7 Units Subcutaneous At Bedtime     insulin glargine  20 Units Subcutaneous At Bedtime     lactulose  20 g Oral TID     levothyroxine  50 mcg Oral Daily     lidocaine  1-2 patch Transdermal Q24h    And     lidocaine   Transdermal Q8H     magnesium chloride  535 mg Oral Daily     modafinil  100 mg Oral Daily     pantoprazole  40 mg Oral BID AC     rifaximin  550 mg Oral BID     sennosides  8.6 mg Oral BID     sodium chloride (PF)  3 mL Intracatheter Q8H     sodium chloride (PF)  3 mL Intracatheter Q8H     thiamine  500 mg Intravenous Q8H     traZODone  50 mg Oral At Bedtime       Review of systems  A 10-point review of systems was negative, unless stated above    Examination  /75 (BP Location: Right arm)   Pulse 108   Temp 98.1  F (36.7  C) (Oral)   Resp 20   Wt 81.1 kg (178 lb 11.2 oz)   SpO2 97%   BMI 27.17 kg/m      Intake/Output Summary (Last 24 hours) at 12/29/2019 1831  Last data filed at 12/29/2019 1800  Gross per 24 hour   Intake 740 ml   Output 1875 ml   Net -1135 ml       General: NAD, jaundiced  CVS: RRR  Resp: CTA  Abdomen: Soft, NT, moderately distended, no  RUQ tenderness  Extremities: No edemea  Neuro: AAO X 3, asterixis present  Skin: no rash    Laboratory  Lab Results   Component Value Date     12/29/2019    POTASSIUM 3.6 12/29/2019    CHLORIDE 112 12/29/2019    CO2 19 12/29/2019    BUN 50 12/29/2019    CR 1.13 12/29/2019       Lab Results   Component Value Date    BILITOTAL 14.5 12/29/2019    ALT 21 12/29/2019    AST 28 12/29/2019    ALKPHOS 55 12/29/2019       Lab Results   Component Value Date    WBC 6.1 12/29/2019    HGB 7.5 12/29/2019     12/29/2019    PLT 47 12/29/2019       Lab Results   Component Value Date    INR 2.84 12/29/2019       MELD-Na score: 29 at 12/29/2019  5:13 AM  MELD score: 29 at 12/29/2019  5:13 AM  Calculated from:  Serum Creatinine: 1.13 mg/dL at 12/29/2019  5:13 AM  Serum Sodium: 141 mmol/L (Rounded to 137 mmol/L) at 12/29/2019  5:13 AM  Total Bilirubin: 14.5 mg/dL at 12/29/2019  5:13 AM  INR(ratio): 2.84 at 12/29/2019  5:13 AM  Age: 59 years     ERCP (12/28/19):  Impression:          - Esophageal varices seen with side viewing duodenoscope.                        - The major papilla appeared normal.                        - Selective biliary cannulation. Significant oozing of                        blood with just gentle cannulation. Due to coagulopathy                        and this finding, elected to avoid biliary                        sphincterotomy. Given atrophic pancreas on imaging due                        to prior alcohol use, the risk of post-ERCP pancreatitis                        in this situation seemed low enough that avoiding a                        biliary sphincterotomy seemed like the better option.                        - The cholangiogram was normal of the biliary tree other                        than a low right posterior duct take off. The cystic                        duct partially filled and the gallbladder did not fill.                        - Wire access to the gallbladder ultimately  achieved                        with significant difficulty.                        - Cystic duct dilated to 4 mm                        - One modified 7 Fr x 15 cm plastic Compass stent with                        side holes was placed into the gallbladder with drainage                        of pus.                        - There was prompt drainage of contrast from the biliary                        tree, therefore seperate stenting of the common bile                        duct was not pursued as this would necessitate a biliary                        sphincterotomy which was avoided today due to                        coagulopathy.     Radiology      Assessment  59 year old w/ EtOH use disorder and decompensated cirrhosis (varices, ascites, likely HCC) admitted for liver transplant evaluation and culture-positive SBP.     Hospital course notable for secondary bacterial peritonitis (para 12/27 with 3600 PMN) 2/2 acute cholecystitis (HIDA without gallbladder uptake) s/p ERCP for transpapillary drainage of the gallbladder. Also interval SVT with hypotension resolved after adenosine evening of 12/28 - otherwise remains stable with gradual clinical improvement after gallbladder drainage.     Transplant evaluation initiated but on hold in setting of acute infections above.    Recommendations:  -- Continue appropriate IV antibiotics with gram negative coverage (currently on ertapenem)  -- Continue to monitor LFTs, if evidence of biliary obstruction (rising T Bili and/or ALP) would need to consider repeat ERCP for biliary sphincterotomy (with reversal of coagulopathy as able) and CBD stenting  -- Transpapillary stent to remain in place indefiniely  -- Trend daily MELD labs  -- Monitor fever curve, WBC daily  -- Follow-up results of TTE  -- Pending hospital course will need outpatient management of HCC    Patient discussed with Dr. Leventhal & Dr. Herzog.    America Parnell MD  GI Fellow, PGY-5  P: [phone]

## 2019-12-30 NOTE — CONSULTS
Procedure Note    Attending: Jared Langford MD  Resident: N/A  Procedure: Diagnostic and therapeutic paracentesis  Indication: SBP  Pre-procedure diagnosis: Cirrhosis   Post-procedure diagnosis: Cirrhosis     A Consent was obtained earlier in the hospital stay and placed in the chart.  A time out was performed.  An area of ascites was located and marked using ultrasound guidance in the RLQ lower quadrant; the area was prepped and draped in the usual sterile fashion.  ~5 ml of 1% lidocaine was instilled and ascites located.  .The 5 Fr paracentesis catheter and needle were inserted under US guidance then the needle removed.  The apparatus was connected to vacuum bottles and a total of 4860 ml of catrina colored clear fluid removed.   A specimen was sent for analysis. The catheter was withdrawn and the area dressed.    Patient tolerated the procedure well with no immediate complications.  The primary team was informed of the procedure.     Tahira Langford MD  [facility]   Internal Medicine   [phone]  DOS:  December 30, 2019

## 2019-12-31 NOTE — PROGRESS NOTES
Diabetes Consult Daily  Progress Note          Assessment/Plan:   Charanjit Ibarra is a 59 year old male with history of type 2 diabetes, hypothyroidism, cirrhosis 2/2 alcohol abuse ( sober 5 years), anemia and thrombocytopenia, hepatocellular carcinoma (HCC), esophageal varices transfered from OSH and  admitted on (12/24/2019) with worsening liver failure and spontaneous bacterial peritonitis.     Type 2 diabetic: A1C is not an accurate measure in ESLD and anemia. A1C 6.8% with HGB 9.5 on (12/25/2019)  -suspect if the insulin was given in the adomen, it was not absorbing 2/2 ascites  -will need to give the insulin in either the arms or thighs.     Plan  - continue with IV insulin for now  - willl be receiving D5 at 125 ml/hour x 10 hours ( for hypernatremia and to replace free water deficit)    -Novolog: prandial insulin 1 unit per 5 grams of CHO for meals/sacks/supplements  - most likely will require assistance with diabetes management after discharge  -patient care order to given insulin injections in either the arms or legs only   -will continue to follow                            Outpatient diabetes follow up: TBD  Plan discussed with patient and primary team.           Interval History:   The last 24 hours progress and nursing notes reviewed.  Prandial insulin ordered, but has not been receiving  Diet changed to dysphagia diet level 1 with nectar thick liquids  IV rates 4-8 units/hour. Glucose < 200  Had pudding and applesauce this am     PTA:  Not sure what he was taking at home  lantus 15 units  Novolog sliding scale    Recent Labs   Lab 12/31/19  0957 12/31/19  0852 12/31/19  0748 12/31/19  0657 12/31/19  0555 12/31/19  0458  12/30/19  0456  12/30/19  0101  12/29/19  0513  12/28/19  2236  12/28/19  0258   GLC  --   --   --   --   --  135*  --  206*  --  400*  --  298*  --  305*  --  232*   * 198* 171* 146* 141* 121*   < >  --    < >  --    < >  --    < >  --    < >  --     < > =  values in this interval not displayed.               Review of Systems:   See interval hx          Medications:     Orders Placed This Encounter      2 Gram Sodium Diet       Physical Exam:  Gen: Alert, resting in bed, in NAD   HEENT: icteric sclera, mucous membranes are moist  Resp: non-labored  Ext: No lower extremity edema   Neuro:oriented person ( place and time not assessed), garbled speech, sometimes able to understand  /76 (BP Location: Right arm)   Pulse 117   Temp 98.4  F (36.9  C) (Axillary)   Resp 20   Wt 80.2 kg (176 lb 12.9 oz)   SpO2 99%   BMI 26.88 kg/m             Data:     Lab Results   Component Value Date    A1C 6.8 12/25/2019              CBC RESULTS:   Recent Labs   Lab Test 12/31/19 0458   WBC 10.8   RBC 2.82*   HGB 9.6*   HCT 29.6*   *   MCH 34.0*   MCHC 32.4   RDW 19.1*   PLT 80*     Recent Labs   Lab Test 12/31/19 0458 12/30/19  2357 12/30/19 0456   *  --  144   POTASSIUM 3.8 4.0 3.2*   CHLORIDE 122*  --  114*   CO2 19*  --  19*   ANIONGAP 8  --  11   *  --  206*   BUN 39*  --  45*   CR 0.89  --  0.98   TRA 10.8*  --  10.6*     Liver Function Studies -   Recent Labs   Lab Test 12/31/19 0458   PROTTOTAL 6.3*   ALBUMIN 3.7   BILITOTAL 12.3*   ALKPHOS 73   AST 63*   ALT 34     Lab Results   Component Value Date    INR 2.45 12/31/2019    INR 2.49 12/30/2019    INR 2.84 12/29/2019    INR 2.47 12/28/2019    INR 2.22 12/27/2019    INR 2.21 12/26/2019    INR 2.33 12/25/2019    INR 1.85 02/05/2019    INR 1.80 05/09/2018    INR 1.93 12/19/2017             Joan Ramsey -[phone]  Diabetes Management job code [id number]

## 2019-12-31 NOTE — PROVIDER NOTIFICATION
-------------------CRITICAL LAB VALUE-------------------    Lab Value: LA - 2.7  Time of notification: 11:11 AM  MD notified: Mayra Cameron  Patient status:  Temp:  [97.5  F (36.4  C)-98.4  F (36.9  C)] 98.4  F (36.9  C)  Pulse:  [117] 117  Heart Rate:  [] 117  Resp:  [16-22] 20  BP: (108-139)/(73-79) 122/76  SpO2:  [95 %-99 %] 99 %  Orders received: No new orders at this time. Will continue with POC and notify team with any changes.

## 2019-12-31 NOTE — PROGRESS NOTES
CLINICAL NUTRITION SERVICES     Nutrition Prescription    RECOMMENDATIONS FOR MDs/PROVIDERS TO ORDER:  None at this time     Recommendations already ordered by Registered Dietitian (RD):  Boost Glucose Control at 10:00 and HS   Magic cup at @ 2:00     Ordered Calorie count     Future/Additional Recommendations:  Strongly recommend enteral nutrition if patient is unable to meet 60% of low end estimated energy needs  (~1200 kcal and 62 g protein)     Isosource 1.5 @ goal 60 ml/hr (1440 ml/day) to provide 2160 kcals (27 kcal/kg/day), 98 g PRO (1.2 g/kg/day), 1094 ml free H2O, 253 g CHO and 22 g Fiber daily.  -1 packet Prosource daily  (40 kcal and 11 g protein)  TF + modular = 2220 kcal (28 kcal/kg) and 109 g protein (1.3 g/kg)  --Start at 10 ml/hr and advance by 10 ml q 6 hours   -Monitor K+, Mg, and Po4. Do not start if below normal and hold advancement if low and replace electrolytes.   --Water flush per primary team (30 ml q 4 hours for tube patency).      NEW FINDINGS   Diet Order: Dysphagia 1, with nectar thickened liquids (12/31)    Patient with mostly incoherent speech, did report he doesn't like the food. Asked for a popsicle then reported he prefers sugar free things however, retracted that statement later.     Currently on insulin gtt- endocrine following     Interventions  Collaboration with other providers- called SHAYE on primary team. Discussed patient may need enteral nutrition but largest concern at this time is that he would pull tube out immediately. Patient now is 1:1 and hope to get assistance/more encouragement with eating.     Medical food supplement therapy    Lala Faith RD, LD  6B pager: [phone]

## 2019-12-31 NOTE — PLAN OF CARE
6B PT - PT to continue to hold evaluation. Cognition continues to be the largest barrier to safe functional mobility. Pt's needs being met by IP OT. Will follow from a periphery.

## 2019-12-31 NOTE — PLAN OF CARE
Neuro: Confused, oriented to self only. Garbled, illogical, rambling speech. Restless. Redirection provided. Sitter at bedside for safety.  Cardiac: ST with HR's 110-130's. VSS. Afebrile.   Respiratory: Sating >92% on RA. LS clear/diminished.  GI/: Adequate urine output. BM X3. Scheduled lactulose given.  Diet/appetite: Tolerating DD1 diet. On carb/calorie counts. Snacks/supplements provided. Poor appetite. Encouraged PO intake.   Activity: Assist of 1, up to chair and SBC.  Pain: Denied.  Skin: No new deficits noted.  LDA's: 2 L PIV's - infusing D5 at 125mL/hr, insulin gtt, Algo 4.    Plan: Continue with POC. Notify primary team with changes.
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
## 2019-12-31 NOTE — PLAN OF CARE
A: Oriented to self only, pt illogical with flight of ideas and garbled speech, intermittently alert-lethargic overnight, pt fighting sleep despite writer promoting rest and giving prns. VSS. Sinus Tach 100-120. Afebrile. Denies pain and nausea. 2g NA diet, RN requesting swallow study, patient is now swallowing well and coughs after any intake. 20 meq K given overnight. 3 loose BM's with prn lactulose given. Voiding AUO, intermittently incontinent urine and stool. No new skin deficits noted. Pt repositions self. Sitter at bedside for patient safety.     I/O this shift:  In: 180 [I.V.:180]  Out: -     Temp:  [97.5  F (36.4  C)-98.6  F (37  C)] 98.1  F (36.7  C)  Pulse:  [117] 117  Heart Rate:  [] 117  Resp:  [16-22] 22  BP: (108-139)/(73-79) 108/76  SpO2:  [95 %-99 %] 95 %     R: Continue with POC. Notify primary team with changes.

## 2019-12-31 NOTE — PROGRESS NOTES
12/31/19 0807   General Information   Onset Date 12/24/19   Start of Care Date 12/31/19   Referring Physician Leann Yost MD   Patient Profile Review/OT: Additional Occupational Profile Info See Profile for full history and prior level of function   Patient/Family Goals Statement Pt did not state   Swallowing Evaluation Bedside swallow evaluation   Behaviorial Observations Confused;Distractible   Mode of current nutrition Oral diet   Type of oral diet Regular;Thin liquid   Respiratory Status Room air   Comments Charanjit Ibarra is a 59 year old male admitted on 12/24/2019. He has a history of cirrhosis secondary to alcohol abuse now sober for 5 years, HCC, DM II and esophageal varices who was admitted to the hospital as a transfer from [facility] with worsening liver failure and spontaneous bacterial peritonitis and E.coli blood stream infection along with cholecystitis and portal vein thrombosis. Paracentesis after admission revealed 14K WBC and E.coli and antibiotics were transitioned to Ertapenem. He was also found to have cholecystisis and underwent gallbladder stent placement and ERCP on 12/28/19. His bilirubin levels are improving, his WBC is improved, but now he is struggling with worsening delirium, poor sleep and ongoing peritoneal fluid collections. RN reports coughing with pills overnight. Pt confused but agreeable to participate in ST eval. Clinical swallow eval completed per MD orders to further assess oropharyngeal swallow function.    Clinical Swallow Evaluation   Oral Musculature generally intact   Structural Abnormalities none present   Dentition edentulous, does not have dentures   Mucosal Quality dry   Mandibular Strength and Mobility intact   Oral Labial Strength and Mobility WFL   Lingual Strength and Mobility WFL   Velar Elevation intact   Buccal Strength and Mobility intact   Laryngeal Function Cough;Voicing initiated   Additional Documentation Yes   Clinical Swallow Eval:  Thin Liquid Texture Trial   Mode of Presentation, Thin Liquids spoon;cup;self-fed;fed by clinician   Volume of Liquid or Food Presented 2 oz   Oral Phase of Swallow Premature pharyngeal entry   Pharyngeal Phase of Swallow impaired;coughing/choking   Diagnostic Statement Pt tolerated ice chips with no overt s/sx of aspiration. Pt with overt s/sx of aspiration marked by coughing on thin liquids via spoon and cup   Clinical Swallow Eval: Nectar Thick Liquid Texture Trial   Mode of Presentation, Nectar cup;self-fed   Volume of Nectar Presented 3 oz   Oral Phase, [place] WFL   Pharyngeal Phase, [place] intact   Diagnostic Statement Pt tolerated nectar thick liquids via cup with no overt s/sx of aspiration    Clinical Swallow Eval: Puree Solid Texture Trial   Mode of Presentation, Puree spoon;fed by clinician   Volume of Puree Presented 3 tbsp   Oral Phase, Puree WFL   Pharyngeal Phase, Puree intact   Diagnostic Statement Pt tolerated pureed textures via spoon with no overt s/sx of aspiration    Clinical Swallow Eval: Semisolid Texture Trial   Mode of Presentation, Semisolid spoon;fed by clinician   Volume of Semisolid Food Presented 1 tbsp   Oral Phase, Semisolid Residue in oral cavity   Oral Residue, Semisolid mid posterior tongue   Pharyngeal Phase, Semisolid intact   Diagnostic Statement No overt s/sx of aspiration. Pt required prolonged time for mastication which resulted in moderate oral residuals. Pt was able to clear residuals with cues to use liquid wash x2   VFSS Evaluation   VFSS Additional Documentation No   FEES Evaluation   Additional Documentation No   Swallow Compensations   Swallow Compensations Alternate viscosity of consistencies;Pacing;Reduce amounts;Multiple swallow   Results Oral difficulties only;Suspect aspiration   General Therapy Interventions   Planned Therapy Interventions Dysphagia Treatment   Dysphagia treatment Compensatory strategies for swallowing;Instruction of safe swallow  strategies;Modified diet education   Swallow Eval: Clinical Impressions   Skilled Criteria for Therapy Intervention Skilled criteria met.  Treatment indicated.   Functional Assessment Scale (FAS) 3   Treatment Diagnosis moderate oropharyngeal dysphagia    Diet texture recommendations Dysphagia diet level 1;Nectar thick liquids   Recommended Feeding/Eating Techniques alternate between small bites and sips of food/liquid;check mouth frequently for oral residue/pocketing;hard swallow w/ each bite or sip;maintain upright posture during/after eating for 30 mins;small sips/bites   Demonstrates Need for Referral to Another Service dietitian;occupational therapy;physical therapy;respiratory therapy   Therapy Frequency 5x/week   Predicted Duration of Therapy Intervention (days/wks) 2 weeks   Anticipated Discharge Disposition inpatient rehabilitation facility   Risks and Benefits of Treatment have been explained. Yes   Patient, family and/or staff in agreement with Plan of Care Yes   Clinical Impression Comments SLP: Clinical swallow eval completed per MD orders. Pt presents with moderate oropharyngeal dysphagia in the setting of AMS. Pt is edentulous and does not have dentures at present. Thin liquids via spoon and cup resulted in overt s/sx of aspiration marked by coughing. Pt tolerated ice chips, nectar thick liquids via cup, pureed textures, and semisolid textures with no overt s/sx of aspiration. Pt required prolonged time for mastication on semisolid textures which resulted in moderate oral residuals. Pt was able to clear with cues to use liquid wash x2. Recommend dysphagia diet 1 (pureed) and nectar thick liquids with 1:1 supervision. Pt should be fully upright and alert for all PO, take small sips/bites, slow pacing, and alternate between consistencies. ST to continue to follow to assess diet tolerance and advancement as appropriate. Anticipate pt will require ongoing ST follow up at discharge.    Total Evaluation  Time   Total Evaluation Time (Minutes) 10

## 2019-12-31 NOTE — PROGRESS NOTES
[facility], [facility] Progress Note - Hospitalist Service, Gold 4       Date of Admission:  12/24/2019  Assessment & Plan   Charanjit Ibarra is a 59 year old male admitted on 12/24/2019. He has a history of cirrhosis secondary to alcohol abuse now sober for 5 years, HCC, DM II and esophageal varices who was admitted to the hospital as a transfer from [facility] with worsening liver failure and spontaneous bacterial peritonitis and E.coli blood stream infection along with cholecystitis and portal vein thrombosis. Paracentesis after admission revealed 14K WBC and E.coli and antibiotics were transitioned to Ertapenem. He was also found to have cholecystisis and underwent gallbladder stent placement and ERCP on 12/28/19. His bilirubin levels are improving, his WBC is improved, but now he is struggling with worsening delirium, poor sleep and ongoing peritoneal fluid collections.     1. Delirium, multifactorial, secondary to liver disease, poor sleep, recent sepsis   -Upon admission, he would occasionally say things that were tangential and delusion, but now this is all he says and constantly. Concerned about progressive delirium in the setting of his other critical illnesses improving we consulted Psychiatry yesterday and Infectious Disease is following who both agree his delirium is a result of his liver disease, infection and lack of sleep. Per recommendations, we will try to improve sleep with an increased dose of Zyprexa at 10mg at bedtime for nausea, sleep and agitation with 5mg doses prn  - QTc 4787 as of 12/30/19. Will continue to intermittently monitor.   - Continue thiamine 500 mg every 8 hours IV for 3 days     2. Decompensated cirrhosis, MELD 29  3. E.coli spontaneous bacterial peritonitis s/p paracentesis (12/27/19, 12/30/19)  4. E.coli blood stream infection with sepsis  5. Anemia, thrombocytopenia secondary to #2  - He would like to be considered for a liver  transplant and reportedly stopped drinking 5 years ago. GI consulted and appreciate recommendations.  Cardiology consult for pre-transplant optimization, recommended dubamine stress test to be completed once clinically stabilizes   - He underwent paracentesis yesterday with another 5L removed and 25% Albumin given post procedure.  - Continue Cirpo/Flagyl per Infectious Disease recommendations  - Home regimen of Lasix 40mg in the morning, 20mg in the evening has been restarted today.   - Spirinolactone 25mg once daily has been on hold given recent kidney injury and can be restarted later this week if tolerated.  - Continue home lactulose and rifaximin with RN driven lactulose protocol.  - Daily Meld labs   -Hemoglobin is 9.6g/dL and stable and platelets are 80,000 and stable. Continue to monitor daily.    6. Cholecystitis s/p gallbladder stent placement and ERCP (12/28/19)  - Continue antibiotics as above.  Monitor LFTs daily, if signs of biliary obstruction would reverse coagulopathy and repeat ERCP     7. Hypernatremia  -Mr. Ibarra is very deficient in free water as he is not eating and drinking well. We will give D5 125ml/hr for the next 10hr and then reassess fluid needs. He is also being encouraged to eat and drink and being helped with that by staff who are in the room.      8. Sinus tachycardia with a recent episode of SVT, resolved   -He received Adenosine on 12/28/19 secondary to an episode of SVT and has returned to sinus rhythm since his stent was placed and bilirubin has slowly improved. Tachycardia was likely worsened by his fluid status at the time as well.   -Continue telemetry    9. Reported HCC  -Diagnosis has not been confirmed and he has not bee staged. He had a suspicious liver lesion on a prior ultrasound concerning for HCC, however, his ultrasound upon admission revealed this area was difficult to see. Gastroenterology  - Will need CT chest to complete HCC staging which is scheduled for 1/6/19  pending clinical improvement.     10. Protein calorie malnutrition secondary to liver disease and potentially malignancy  11. Dysphagia  -Speech therapy evaluated him today and recommends dysphagia I diet with nectar thick liquids.     -Nutrition has been consulted to provide ongoing recommendations on how to improve his oral intake and nutrition. His nutrition is not going well on a good day, he is eating about 700 calories with a lot of prompting. He is recommended for an NG/NJ temporary feeding tube, however, given the high potential for him to pull out this line and his willingness to eat when he is being fed at the bedside, we will give him another day or two of trying before placing this.      11.  Portal vein thrombosis, not on anticoagulation  Noted on CT abdomen 12/25. Portal venous hypertension with splenomegaly and splenic varices with branch portal vein thrombus in the posterior right portal vein.    - Discussed with GI, appreciate recommendations on need for AC will hold currently given need for procedures intermittently.     12. T2DM, poorly controlled, with recent hyperglycemia from acute illness (hemoglobin A1c 6.8% 12/25/19)  -Endocrinology was consulted when BS reached 400's and he was started on IV insulin. Given addition of D5 today, he will remain on an insulin drip with added carb counting coverage.      13.Hypothyroidism  -TSH 0.66 as of 12/29/19.  Continue home levothyroxine 50 mcg Qday.    Diet: Snacks/Supplements Adult: Boost Plus; Between Meals  Dysphagia Diet Level 1 Pureed Nectar Thickened Liquids (pre-thickened or use instant food thickener)    DVT Prophylaxis: naturally anticoagulated from thrombocytopenia  Keith Catheter: not present  Code Status: Full Code      Disposition Plan   Expected discharge: 4 - 7 days, recommended to transitional care unit once     Entered: EDNA Zepeda 12/31/2019, 12:49 PM       EDNA Zepeda  Hospitalist Service, [address]  [facility], [place]  Pager: [phone]  Please see sticky note for cross cover information  ______________________________________________________________________    Interval History   Mr. Ibarra was resting in bed today still talking in tangential circles. He was able to answer my questions about his night, his current symptoms and we discussed his different diagnosis. He was able to recognize that he is confused. He denies any fevers, chills, rashes or sores, new pains of any kind, nausea.     Data reviewed today: I reviewed all medications, new labs and imaging results over the last 24 hours.    Physical Exam   Vital Signs: Temp: 97.6  F (36.4  C) Temp src: Axillary BP: 132/78 Pulse: 130 Heart Rate: 130 Resp: 19 SpO2: 98 % O2 Device: None (Room air)    Weight: 176 lbs 12.94 oz    General: 60yo male sitting up in bed, talking in circles, reports some mild abdominal pain  Cardiac: Reguar rate and  rhythm, no appreciable murmurs, rubs or gallops  Lungs: breathing comfortably on room air, no adventitious sounds to bilateral auscultation  Abdomen: moderately distended, improved when compared to yesterday, minimally tender to palpation throughout, ecchymosis present over lower abdominal quadrants due to recent insulin injections  Psych: alert, oriented to his name but cannot tell me the name of this hospital, state or month. He can answer questions and do tasks when asked but continues on with tangential paragraphs and occasional delusions.   Skin: warm, dry, scattered ecchymosis over bilateral upper and lower extremities    Data   Recent Labs   Lab 12/31/19  0458 12/30/19  2357 12/30/19  0456 12/30/19  0101 12/29/19  0513 12/28/19  2236   WBC 10.8  --  7.4  --  6.1 9.5   HGB 9.6*  --  8.4*  --  7.5* 8.6*   *  --  104*  --  102* 102*   PLT 80*  --  57*  --  47* 57*   INR 2.45*  --  2.49*  --  2.84*  --    *  --  144 142 141 140   POTASSIUM 3.8 4.0 3.2* 3.6 3.6 3.6   CHLORIDE 122*  --  114* 114*  112* 109   CO2 19*  --  19* 19* 19* 17*   BUN 39*  --  45* 47* 50* 45*   CR 0.89  --  0.98 1.03 1.13 1.08   ANIONGAP 8  --  11 9 10 13   TRA 10.8*  --  10.6* 10.4* 10.0 9.8   *  --  206* 400* 298* 305*   ALBUMIN 3.7  --  3.9  --  3.7 4.0   PROTTOTAL 6.3*  --  6.2*  --  5.5* 5.9*   BILITOTAL 12.3*  --  13.2*  --  14.5* 15.5*   ALKPHOS 73  --  72  --  55 61   ALT 34  --  27  --  21 19   AST 63*  --  47*  --  28 29   TROPI  --   --   --   --   --  <0.015

## 2019-12-31 NOTE — PLAN OF CARE
Discharge Planner SLP   Patient plan for discharge: none stated   Current status: SLP: Clinical swallow eval completed per MD orders. Pt presents with moderate oropharyngeal dysphagia in the setting of AMS. Pt is edentulous and does not have dentures at present. Thin liquids via spoon and cup resulted in overt s/sx of aspiration marked by coughing. Pt tolerated ice chips, nectar thick liquids via cup, pureed textures, and semisolid textures with no overt s/sx of aspiration. Pt required prolonged time for mastication on semisolid textures which resulted in moderate oral residuals. Pt was able to clear with cues to use liquid wash x2. Recommend dysphagia diet 1 (pureed) and nectar thick liquids with 1:1 supervision. Pt should be fully upright and alert for all PO, take small sips/bites, slow pacing, and alternate between consistencies. ST to continue to follow to assess diet tolerance and advancement as appropriate. Anticipate pt will require ongoing ST follow up at discharge.   Barriers to return to prior living situation: dysphagia, AMS, medical readiness   Recommendations for discharge: TCU  Rationale for recommendations: pt would benefit from continued ST to safely return to baseline diet level        Entered by: Maria G Dave 12/31/2019 8:24 AM

## 2019-12-31 NOTE — PROGRESS NOTES
Sepsis Evaluation Progress Note    I was called to see Charanjit Ibarra due to lactic acid 2.3. He is known to have an infection.     Physical Exam   Vital Signs:  Temp: 98.1  F (36.7  C) Temp src: Oral BP: 108/76 Pulse: 117 Heart Rate: 117 Resp: 22 SpO2: 95 % O2 Device: None (Room air)      Lab:  Lactic Acid   Date Value Ref Range Status   12/29/2019 3.8 (H) 0.7 - 2.0 mmol/L Final     Lactate for Sepsis Protocol   Date Value Ref Range Status   12/31/2019 2.3 (H) 0.7 - 2.0 mmol/L Final     Comment:     Significant value called to and read back by  DUSTIN RAMIREZ ON 12/31/19           The patient has altered mental status but mental status on exam today unchanged from mental status this admission.    The rest of their physical exam is significant for moderate abdominal distension with fluid wave present without tenderness to palpation of abdomen, +bs. HR RRR, good air movement bilaterally without wheezing but exam limited by patient ability to participate in exam. Pt is alert, not oriented to person, place, time or situation, intermittently seeing things in his room.     Assessment & Plan   Charanjit Ibarra meets SIRS criteria but does NOT have evidence of acute organ damage.  These vital sign, lab and physical exam findings are consistent with SEPSIS. Lactic acid elevation is in setting of reduced lactic acid clearance/consumption with liver dysfunction.    Sepsis Time-Zero (time Sepsis diagnosis confirmed):  12/31/19    Anti-infectives (From now, onward)    Start     Dose/Rate Route Frequency Ordered Stop    12/30/19 1730  metroNIDAZOLE (FLAGYL) infusion 500 mg      500 mg  over 60 Minutes Intravenous EVERY 8 HOURS 12/30/19 1709 12/30/19 1715  cefTRIAXone (ROCEPHIN) 2 g vial to attach to  ml bag for ADULTS or NS 50 ml bag for PEDS      2 g  over 30 Minutes Intravenous EVERY 24 HOURS 12/30/19 1709 12/24/19 2015  rifaximin (XIFAXAN) tablet 550 mg      550 mg Oral 2 TIMES DAILY 12/24/19  2008          Current antibiotic coverage is appropriate for source of infection.     Plan:   --albumin 25GM of 25% ordered x1  --repeat lactic acid ordered at 11AM  --Nursing concerned about swallowing ability/dysphagia, SLP consult ordered to assess.     Disposition: The patient will remain on the current unit. We will continue to monitor this patient closely.  Leann Yost MD    Sepsis Criteria   Sepsis: 2+ SIRS criteria due to infection  Severe Sepsis: Sepsis AND 1+ new sign of acute organ dysfunction (Note: lactate >2 is organ dysfunction)  Septic Shock: Sepsis AND hypotension despite volume resuscitation with 30 ml/kg crystalloid

## 2019-12-31 NOTE — PROGRESS NOTES
[facility] ID Service: Follow Up Note      Patient:  Charanjit Ibarra   Date of birth 1960, Medical record number [id number]  Date of Visit:  12/31/2019  Date of Admission: 12/24/2019         Assessment and Recommendations:   ID Problem List:  1. Bacterial peritonitis, suspected secondary to cholecystitis  2. Cholecystitis  3. Decompensated ETOH cirrhosis, sober 3-4 yrs  4. Leukocytosis  5. AMELIA   6. Thrombocytopenia  7. Chronic pain due to DJD, managed at pain clinic      Recommendations:  - Continue Ceftriaxone 2g IV q24 hours, Flagyl 500mg IV q8h (abx start date: 12/24)  - If lactic acid continues to trend up or clinically decompensating would consider re-imaging abdomen to assess for abscess, progressive inflammatory/infectious changes    Discussion:  This is a 59 year old male with history significant for cirrhosis secondary to alcohol abuse, possible HCC, and DJD who initially presented to [facility] ([place], [place]) on 12/22 with abdominal pain. He was found to have SBP with ascites, paracentesis completed and culture of ascites fluid positive for pan-sensitive E.coli. Also found to have E.coli bacteremia (first/last positive 12/22). First negative appears to be 12/25 at [facility]. He was started on ciprofloxacin an ertapenem. Developed progressive abdominal distension, pain, and increase in bilirubin (5.4 to 16.9) with WBC also increasing to peak of 26.9 (12/27). CT with gall bladder sludge and wall thickening. HIDA findings consistent with cholecystitis, however study may be affected by underlying hepatocellular dysfunction. Underwent ERCP on 12/28/19 with obstructed gallbladder and cystic duct stent placed with drainage of pus.     Mr. Ibarra remains afebrile on IV ceftriaxone for pan-sensitive E.coli bacteremia and bacterial peritonitis and Flagyl for treatment of intra-abdominal anaerobes in setting of cholecystitis. S/P paracentesis (12/30) with removal of 4860 catrina fluid; fluid WBC is  "downtrending. Agree with primary team's assessment of intravascular volume depletion, which may be contributing to elevated lactic acid and account for electrolyte abnormalities and increasing WBC (all cell lines increased). If clinically deteriorates or lactic acid continues to rise, would also consider re-imaging abdomen to evaluate for progressive inflammation, ERCP complications, or abscess formation.      I have seen this patient was staffed with Dr. Ching.  Don't hesitate to call with questions.         I have reviewed today's vital signs, medications, labs and imaging.  Nabila Gupta PA-C, Pager # [phone]            Interval History:     Abdominal pain improving. Stated \"I know I've met you before, we met in [place]\" and is surprised to hear that he is at [facility] in [place]. Changes topic frequently. Denies fever, chills, nausea, vomiting, pain at time of visit, rashes.         Review of Systems:   Complete ROS negative, accuracy may be limited due to patient delirium         Current Antimicrobials   Current:  Ceftriaxone (start 12/30)  Metronidazole (start 12/30)  Rifaximin (home med)    Prior:  Ertapenem (12/25-12/30)  Ciprofloxacin (12/24-12/25)  Micafungin (12/27-12-28)  Vancomycin (12/27-12/28)         Physical Exam:   Ranges for vital signs:  Temp:  [97.5  F (36.4  C)-98.4  F (36.9  C)] 98.4  F (36.9  C)  Pulse:  [117] 117  Heart Rate:  [] 117  Resp:  [16-22] 20  BP: (108-139)/(73-79) 122/76  SpO2:  [95 %-99 %] 99 %    Intake/Output Summary (Last 24 hours) at 12/30/2019 1045  Last data filed at 12/30/2019 1040  Gross per 24 hour   Intake 821.67 ml   Output 1100 ml   Net -278.33 ml     Exam:  GENERAL:  Alert, resting in bed. Changes topic frequently but answers questions.   ENT:  Head is normocephalic, atraumatic. Oropharynx is moist without exudates or ulcers.  EYES:  Eyes have icteric sclerae.    NECK:  Supple.  LUNGS:  Clear to auscultation.  CARDIOVASCULAR:  Regular rate and rhythm with no " murmurs, gallops or rubs.  ABDOMEN:  +distended with fluid wave, minimally tender to palpitation throughout. + bowel sounds.   EXT: Extremities warm and without edema.  SKIN:  Jaundiced. No acute rashes.  Line is in place without any surrounding erythema. +scattered ecchymosis on limbs.  NEUROLOGIC:  Alert. Moves all extremitites. Oriented to person only and aware that he is in the hospital.         Laboratory Data:   Reviewed.  Pertinent for:     12/27/19 C.diff toxin B PCR: negative    Culture data:  12/30/19 Peritoneal fluid aerobic culture: PENDING  12/27/19 Peritoneal fluid anaerobic culture: NGTD (preliminary)  12/27/19 Peritoneal fluid aerobic culture: NGTD (preliminary)  12/27/19 Blood culture x2 NGTD (preliminary)  12/26/19 Blood culture x2 NGTD (preliminary)  12/25/19 Peritoneal fluid: E.coli (intermediate: Levofloxacin)  12/25/19 Blood culture x2: No growth     12/25/2019 17:00 12/27/2019 10:00 12/30/2019 16:00   Body Fluid Analysis Source Ascites Ascites Ascites   Color Fluid Orange Orange Orange   Appearance Fluid Cloudy Cloudy Slightly Cloudy   WBC Fluid 12330 4137 743   % Lymphocytes Fluid 1 1 6   % Mono/Macro Fluid 15  30   % Neutrophils Fluid 84 90 64   % Other Cells Fluid  9    Albumin Fluid 1.4     Bilirubin Fluid 3.8     Glucose Fluid 181     Glucose Fluid Source Ascites     Lactate Dehydrogenase Fluid 654     LD Fluid Source Ascites     Protein Total Fluid 2.5     Protein Total Fluid Source Ascites        12/29/2019 05:13 12/30/2019 04:56 12/31/2019 04:58   WBC 6.1 7.4 10.8   Hemoglobin 7.5 (L) 8.4 (L) 9.6 (L)   Hematocrit 22.3 (L) 25.5 (L) 29.6 (L)   Platelet Count 47 (LL) 57 (L) 80 (L)   RBC Count 2.19 (L) 2.46 (L) 2.82 (L)    (H) 104 (H) 105 (H)   MCH 34.2 (H) 34.1 (H) 34.0 (H)   MCHC 33.6 32.9 32.4   RDW 17.2 (H) 18.1 (H) 19.1 (H)      12/30/2019 01:01 12/30/2019 04:56 12/31/2019 04:58   Sodium 142 144 149 (H)   Potassium 3.6 3.2 (L) 3.8   Chloride 114 (H) 114 (H) 122 (H)   Carbon  Dioxide 19 (L) 19 (L) 19 (L)   Urea Nitrogen 47 (H) 45 (H) 39 (H)   Creatinine 1.03 0.98 0.89   GFR Estimate 78 82 >90   GFR Estimate If Black >90 >90 >90   Calcium 10.4 (H) 10.6 (H) 10.8 (H)   Anion Gap 9 11 8   Magnesium   1.7   Phosphorus   2.3 (L)   Albumin  3.9 3.7   Protein Total  6.2 (L) 6.3 (L)   Bilirubin Total  13.2 (H) 12.3 (H)   Alkaline Phosphatase  72 73   ALT  27 34   AST  47 (H) 63 (H)   Ammonia   29   Bilirubin Direct   7.8 (H)          Imaging:   HIDA (12/26/19)  Impression:  1. Nonvisualization gallbladder is consistent in the proper clinical  setting with cholecystitis.  2. Underlying hepatocellular dysfunction with reduction of clearance  of the radionuclide from the blood pool.    Ct abdomen & pelvis w/contrast (12/25/19)  Per radiologist report  IMPRESSION:  1. Changes from chronic liver disease with cirrhotic and macronodular  liver. Portal venous hypertension with splenomegaly and splenic  varices. Branch portal vein thrombus in the posterior right portal  vein. There are also varices in the anterior abdominal wall.  2. Large volume simple ascites in all 4 quadrants. In the pelvis there  is a small amount of enhancement about the simple appearing fluid  indicating some loculated component. Findings consistent with recent  ascites fluid analysis suggesting SBP.  3. Distended gallbladder measuring up to 12.2 cm in length. Possible  gallbladder wall thickening although this may be secondary to large  volume ascites. Ultrasound and CT performed at outside hospital  12/23/2019 also showed distended gallbladder with sludge and stones.  Unable to rule out acute cholecystitis.  4. Some loops of bowel within the lower anterior abdomen demonstrate a  slightly thickened appearance which may be reactive to the ascites. No  evidence for bowel obstruction  5. Anterior abdominal wall hernia with ascitic fluid in the anterior  abdominal wall. There is also a right inguinal canal hernia with  fluid.

## 2019-12-31 NOTE — COMMITTEE REVIEW
Abdominal Committee Review Note     Evaluation Date: 12/18/2017  Committee Review Date: 12/31/2019    Organ being evaluated for: Liver    Transplant Phase: Evaluation  Transplant Status: Active    Transplant Coordinator: Rosalba Peres  Transplant Surgeon:   Oumou Pink    Referring Physician: Selena Givens    Primary Diagnosis: Alcoholic Cirrhosis  Secondary Diagnosis:     Committee Review Members:  Pharmacy Umair Richardson, [facility]    - Clinical Cintia Welch, ASHLEY, Janny Mcclelland, [facility]   Transplant Jozef Andrews MD, Kaitlynn Guadalupe MD, Bailey Osei, APRN CNP, Angelito Dietz MD, Kaitlin Ochoa RN, Toro Cruz MD, Thomas M. Leventhal, MD, Oumou Pink MD       Transplant Eligibility: Cirrhosis with MELD, HCC    Committee Review Decision: Needs Re-presentation    Relative Contraindications:     Absolute Contraindications:     Committee Chair Leventhal, Thomas Michael, MD verbally attested to the committee's decision.    Committee Discussion Details:     Rediscuss pending outpatient follow up with Dr. Mirela RUST and transplant surgery.     Coordinator to coordinate follow up after discharge.

## 2019-12-31 NOTE — PLAN OF CARE
Blood pressure 133/77, pulse 107, temperature 97.5  F (36.4  C), temperature source Axillary, resp. rate 18, weight 82.3 kg (181 lb 7 oz), SpO2 97 %.  Pt VSS, on RA. HR tachy up to 110's.  Denies any pain.  Oriented to self and occasionally time.  Pt speech difficult to understand, illogical/rambling most of the day.  Has been awake all shift.  Up to chair/EOB.  Using commode with 1 assist.  Scheduled and PRN lactulose given.  Psych consult done.  Will start melatonin and zyprexa tonight.  Paracentesis done at bedside, fluid samples sent. Pt tolerated well  25g Albumin given after with 20mg PO lasix.  Low UOP, BM x 2.  Poor appetite, eating bites of each meal.  Insulin gtt on Alg #3, q 1 hour, + carb coverage. K+ replacement ordered this evening.  Will need next dose at 2000 and recheck after.  Sitter remains at bedside.  Continue to monitor.

## 2020-01-01 ENCOUNTER — APPOINTMENT (OUTPATIENT)
Dept: SPEECH THERAPY | Facility: CLINIC | Age: 60
DRG: 432 | End: 2020-01-01
Attending: INTERNAL MEDICINE
Payer: MEDICARE

## 2020-01-01 ENCOUNTER — TELEPHONE (OUTPATIENT)
Dept: GASTROENTEROLOGY | Facility: CLINIC | Age: 60
End: 2020-01-01

## 2020-01-01 ENCOUNTER — DOCUMENTATION ONLY (OUTPATIENT)
Dept: TRANSPLANT | Facility: CLINIC | Age: 60
End: 2020-01-01

## 2020-01-01 ENCOUNTER — ANESTHESIA EVENT (OUTPATIENT)
Dept: MEDSURG UNIT | Facility: CLINIC | Age: 60
DRG: 432 | End: 2020-01-01
Payer: MEDICARE

## 2020-01-01 ENCOUNTER — ANESTHESIA (OUTPATIENT)
Dept: MEDSURG UNIT | Facility: CLINIC | Age: 60
DRG: 432 | End: 2020-01-01
Payer: MEDICARE

## 2020-01-01 ENCOUNTER — APPOINTMENT (OUTPATIENT)
Dept: GENERAL RADIOLOGY | Facility: CLINIC | Age: 60
DRG: 432 | End: 2020-01-01
Attending: PHYSICIAN ASSISTANT
Payer: MEDICARE

## 2020-01-01 ENCOUNTER — APPOINTMENT (OUTPATIENT)
Dept: CT IMAGING | Facility: CLINIC | Age: 60
DRG: 432 | End: 2020-01-01
Attending: PHYSICIAN ASSISTANT
Payer: MEDICARE

## 2020-01-01 VITALS
BODY MASS INDEX: 27.32 KG/M2 | OXYGEN SATURATION: 97 % | RESPIRATION RATE: 44 BRPM | SYSTOLIC BLOOD PRESSURE: 81 MMHG | WEIGHT: 179.68 LBS | TEMPERATURE: 98.9 F | HEART RATE: 122 BPM | DIASTOLIC BLOOD PRESSURE: 38 MMHG

## 2020-01-01 LAB
ALBUMIN SERPL-MCNC: 3.2 G/DL (ref 3.4–5)
ALBUMIN SERPL-MCNC: 3.8 G/DL (ref 3.4–5)
ALP SERPL-CCNC: 72 U/L (ref 40–150)
ALP SERPL-CCNC: 85 U/L (ref 40–150)
ALT SERPL W P-5'-P-CCNC: 27 U/L (ref 0–70)
ALT SERPL W P-5'-P-CCNC: 33 U/L (ref 0–70)
ANION GAP SERPL CALCULATED.3IONS-SCNC: 13 MMOL/L (ref 3–14)
ANION GAP SERPL CALCULATED.3IONS-SCNC: 19 MMOL/L (ref 3–14)
ANION GAP SERPL CALCULATED.3IONS-SCNC: 7 MMOL/L (ref 3–14)
ANION GAP SERPL CALCULATED.3IONS-SCNC: 9 MMOL/L (ref 3–14)
APTT PPP: 69 SEC (ref 22–37)
AST SERPL W P-5'-P-CCNC: 34 U/L (ref 0–45)
AST SERPL W P-5'-P-CCNC: 50 U/L (ref 0–45)
BACTERIA SPEC CULT: NO GROWTH
BASE DEFICIT BLDV-SCNC: 21.8 MMOL/L
BASE DEFICIT BLDV-SCNC: 21.9 MMOL/L
BILIRUB SERPL-MCNC: 13.2 MG/DL (ref 0.2–1.3)
BILIRUB SERPL-MCNC: 14 MG/DL (ref 0.2–1.3)
BUN SERPL-MCNC: 32 MG/DL (ref 7–30)
BUN SERPL-MCNC: 34 MG/DL (ref 7–30)
BUN SERPL-MCNC: 34 MG/DL (ref 7–30)
BUN SERPL-MCNC: 37 MG/DL (ref 7–30)
CA-I BLD-MCNC: 5.1 MG/DL (ref 4.4–5.2)
CA-I BLD-SCNC: 4.9 MG/DL (ref 4.4–5.2)
CALCIUM SERPL-MCNC: 10.2 MG/DL (ref 8.5–10.1)
CALCIUM SERPL-MCNC: 8.6 MG/DL (ref 8.5–10.1)
CALCIUM SERPL-MCNC: 8.8 MG/DL (ref 8.5–10.1)
CALCIUM SERPL-MCNC: 9 MG/DL (ref 8.5–10.1)
CHLORIDE SERPL-SCNC: 119 MMOL/L (ref 94–109)
CHLORIDE SERPL-SCNC: 120 MMOL/L (ref 94–109)
CHLORIDE SERPL-SCNC: 122 MMOL/L (ref 94–109)
CHLORIDE SERPL-SCNC: 125 MMOL/L (ref 94–109)
CO2 BLDCOV-SCNC: 11 MMOL/L (ref 21–28)
CO2 SERPL-SCNC: 12 MMOL/L (ref 20–32)
CO2 SERPL-SCNC: 14 MMOL/L (ref 20–32)
CO2 SERPL-SCNC: 16 MMOL/L (ref 20–32)
CO2 SERPL-SCNC: 18 MMOL/L (ref 20–32)
CREAT SERPL-MCNC: 1.07 MG/DL (ref 0.66–1.25)
CREAT SERPL-MCNC: 1.08 MG/DL (ref 0.66–1.25)
CREAT SERPL-MCNC: 1.28 MG/DL (ref 0.66–1.25)
CREAT SERPL-MCNC: 2.17 MG/DL (ref 0.66–1.25)
CREAT UR-MCNC: 59 MG/DL
ERYTHROCYTE [DISTWIDTH] IN BLOOD BY AUTOMATED COUNT: 20.4 % (ref 10–15)
ERYTHROCYTE [DISTWIDTH] IN BLOOD BY AUTOMATED COUNT: 21.1 % (ref 10–15)
ERYTHROCYTE [DISTWIDTH] IN BLOOD BY AUTOMATED COUNT: 22.1 % (ref 10–15)
ERYTHROCYTE [DISTWIDTH] IN BLOOD BY AUTOMATED COUNT: 23.7 % (ref 10–15)
FRACT EXCRET NA UR+SERPL-RTO: 1.5 %
GFR SERPL CREATININE-BSD FRML MDRD: 32 ML/MIN/{1.73_M2}
GFR SERPL CREATININE-BSD FRML MDRD: 60 ML/MIN/{1.73_M2}
GFR SERPL CREATININE-BSD FRML MDRD: 74 ML/MIN/{1.73_M2}
GFR SERPL CREATININE-BSD FRML MDRD: 75 ML/MIN/{1.73_M2}
GLUCOSE BLD-MCNC: 61 MG/DL (ref 70–99)
GLUCOSE BLDC GLUCOMTR-MCNC: 108 MG/DL (ref 70–99)
GLUCOSE BLDC GLUCOMTR-MCNC: 114 MG/DL (ref 70–99)
GLUCOSE BLDC GLUCOMTR-MCNC: 118 MG/DL (ref 70–99)
GLUCOSE BLDC GLUCOMTR-MCNC: 122 MG/DL (ref 70–99)
GLUCOSE BLDC GLUCOMTR-MCNC: 128 MG/DL (ref 70–99)
GLUCOSE BLDC GLUCOMTR-MCNC: 128 MG/DL (ref 70–99)
GLUCOSE BLDC GLUCOMTR-MCNC: 132 MG/DL (ref 70–99)
GLUCOSE BLDC GLUCOMTR-MCNC: 132 MG/DL (ref 70–99)
GLUCOSE BLDC GLUCOMTR-MCNC: 135 MG/DL (ref 70–99)
GLUCOSE BLDC GLUCOMTR-MCNC: 136 MG/DL (ref 70–99)
GLUCOSE BLDC GLUCOMTR-MCNC: 138 MG/DL (ref 70–99)
GLUCOSE BLDC GLUCOMTR-MCNC: 138 MG/DL (ref 70–99)
GLUCOSE BLDC GLUCOMTR-MCNC: 144 MG/DL (ref 70–99)
GLUCOSE BLDC GLUCOMTR-MCNC: 148 MG/DL (ref 70–99)
GLUCOSE BLDC GLUCOMTR-MCNC: 149 MG/DL (ref 70–99)
GLUCOSE BLDC GLUCOMTR-MCNC: 153 MG/DL (ref 70–99)
GLUCOSE BLDC GLUCOMTR-MCNC: 158 MG/DL (ref 70–99)
GLUCOSE BLDC GLUCOMTR-MCNC: 160 MG/DL (ref 70–99)
GLUCOSE BLDC GLUCOMTR-MCNC: 164 MG/DL (ref 70–99)
GLUCOSE BLDC GLUCOMTR-MCNC: 169 MG/DL (ref 70–99)
GLUCOSE BLDC GLUCOMTR-MCNC: 169 MG/DL (ref 70–99)
GLUCOSE BLDC GLUCOMTR-MCNC: 178 MG/DL (ref 70–99)
GLUCOSE BLDC GLUCOMTR-MCNC: 179 MG/DL (ref 70–99)
GLUCOSE BLDC GLUCOMTR-MCNC: 179 MG/DL (ref 70–99)
GLUCOSE BLDC GLUCOMTR-MCNC: 180 MG/DL (ref 70–99)
GLUCOSE BLDC GLUCOMTR-MCNC: 186 MG/DL (ref 70–99)
GLUCOSE BLDC GLUCOMTR-MCNC: 187 MG/DL (ref 70–99)
GLUCOSE BLDC GLUCOMTR-MCNC: 192 MG/DL (ref 70–99)
GLUCOSE BLDC GLUCOMTR-MCNC: 208 MG/DL (ref 70–99)
GLUCOSE BLDC GLUCOMTR-MCNC: 210 MG/DL (ref 70–99)
GLUCOSE BLDC GLUCOMTR-MCNC: 212 MG/DL (ref 70–99)
GLUCOSE BLDC GLUCOMTR-MCNC: 222 MG/DL (ref 70–99)
GLUCOSE BLDC GLUCOMTR-MCNC: 224 MG/DL (ref 70–99)
GLUCOSE BLDC GLUCOMTR-MCNC: 237 MG/DL (ref 70–99)
GLUCOSE BLDC GLUCOMTR-MCNC: 243 MG/DL (ref 70–99)
GLUCOSE BLDC GLUCOMTR-MCNC: 80 MG/DL (ref 70–99)
GLUCOSE SERPL-MCNC: 156 MG/DL (ref 70–99)
GLUCOSE SERPL-MCNC: 160 MG/DL (ref 70–99)
GLUCOSE SERPL-MCNC: 236 MG/DL (ref 70–99)
GLUCOSE SERPL-MCNC: 71 MG/DL (ref 70–99)
HCO3 BLDV-SCNC: 10 MMOL/L (ref 21–28)
HCO3 BLDV-SCNC: 8 MMOL/L (ref 21–28)
HCT VFR BLD AUTO: 17.2 % (ref 40–53)
HCT VFR BLD AUTO: 26.4 % (ref 40–53)
HCT VFR BLD AUTO: 26.7 % (ref 40–53)
HCT VFR BLD AUTO: 33.2 % (ref 40–53)
HGB BLD-MCNC: 10.5 G/DL (ref 13.3–17.7)
HGB BLD-MCNC: 5 G/DL (ref 13.3–17.7)
HGB BLD-MCNC: 8 G/DL (ref 13.3–17.7)
HGB BLD-MCNC: 8.5 G/DL (ref 13.3–17.7)
INR PPP: 2.54 (ref 0.86–1.14)
INR PPP: 3.32 (ref 0.86–1.14)
INR PPP: 7.38 (ref 0.86–1.14)
INTERPRETATION ECG - MUSE: NORMAL
INTERPRETATION ECG - MUSE: NORMAL
LACTATE BLD-SCNC: 13.8 MMOL/L (ref 0.7–2)
LACTATE BLD-SCNC: 18 MMOL/L (ref 0.7–2)
LACTATE BLD-SCNC: 2.7 MMOL/L (ref 0.7–2)
LACTATE BLD-SCNC: 3.2 MMOL/L (ref 0.7–2)
Lab: NORMAL
MAGNESIUM SERPL-MCNC: 1.6 MG/DL (ref 1.6–2.3)
MCH RBC QN AUTO: 34.4 PG (ref 26.5–33)
MCH RBC QN AUTO: 34.5 PG (ref 26.5–33)
MCH RBC QN AUTO: 35.1 PG (ref 26.5–33)
MCH RBC QN AUTO: 36.2 PG (ref 26.5–33)
MCHC RBC AUTO-ENTMCNC: 29.1 G/DL (ref 31.5–36.5)
MCHC RBC AUTO-ENTMCNC: 30 G/DL (ref 31.5–36.5)
MCHC RBC AUTO-ENTMCNC: 31.6 G/DL (ref 31.5–36.5)
MCHC RBC AUTO-ENTMCNC: 32.2 G/DL (ref 31.5–36.5)
MCV RBC AUTO: 109 FL (ref 78–100)
MCV RBC AUTO: 109 FL (ref 78–100)
MCV RBC AUTO: 115 FL (ref 78–100)
MCV RBC AUTO: 125 FL (ref 78–100)
O2/TOTAL GAS SETTING VFR VENT: 21 %
O2/TOTAL GAS SETTING VFR VENT: ABNORMAL %
OXYHGB MFR BLDV: 10 %
PCO2 BLDV: 30 MM HG (ref 40–50)
PCO2 BLDV: 55 MM HG (ref 40–50)
PCO2 BLDV: 61 MM HG (ref 40–50)
PH BLDV: 6.82 PH (ref 7.32–7.43)
PH BLDV: 6.89 PH (ref 7.32–7.43)
PH BLDV: 7.01 PH (ref 7.32–7.43)
PHOSPHATE SERPL-MCNC: 2.6 MG/DL (ref 2.5–4.5)
PHOSPHATE SERPL-MCNC: 9 MG/DL (ref 2.5–4.5)
PLATELET # BLD AUTO: 188 10E9/L (ref 150–450)
PLATELET # BLD AUTO: 230 10E9/L (ref 150–450)
PLATELET # BLD AUTO: 253 10E9/L (ref 150–450)
PLATELET # BLD AUTO: 76 10E9/L (ref 150–450)
PO2 BLDV: 18 MM HG (ref 25–47)
PO2 BLDV: 23 MM HG (ref 25–47)
PO2 BLDV: 27 MM HG (ref 25–47)
POTASSIUM SERPL-SCNC: 3.4 MMOL/L (ref 3.4–5.3)
POTASSIUM SERPL-SCNC: 3.7 MMOL/L (ref 3.4–5.3)
POTASSIUM SERPL-SCNC: 3.9 MMOL/L (ref 3.4–5.3)
POTASSIUM SERPL-SCNC: 6.6 MMOL/L (ref 3.4–5.3)
PROT SERPL-MCNC: 5.4 G/DL (ref 6.8–8.8)
PROT SERPL-MCNC: 6.6 G/DL (ref 6.8–8.8)
RBC # BLD AUTO: 1.38 10E12/L (ref 4.4–5.9)
RBC # BLD AUTO: 2.32 10E12/L (ref 4.4–5.9)
RBC # BLD AUTO: 2.42 10E12/L (ref 4.4–5.9)
RBC # BLD AUTO: 3.05 10E12/L (ref 4.4–5.9)
SAO2 % BLDV FROM PO2: 16 %
SODIUM SERPL-SCNC: 144 MMOL/L (ref 133–144)
SODIUM SERPL-SCNC: 146 MMOL/L (ref 133–144)
SODIUM SERPL-SCNC: 150 MMOL/L (ref 133–144)
SODIUM SERPL-SCNC: 152 MMOL/L (ref 133–144)
SODIUM UR-SCNC: 62 MMOL/L
SPECIMEN SOURCE: NORMAL
TROPONIN I SERPL-MCNC: 0.09 UG/L (ref 0–0.04)
UUN UR-MCNC: 300 MG/DL
UUN/CREAT 24H UR: 5 G/G CR
WBC # BLD AUTO: 14.4 10E9/L (ref 4–11)
WBC # BLD AUTO: 25.6 10E9/L (ref 4–11)
WBC # BLD AUTO: 36.7 10E9/L (ref 4–11)
WBC # BLD AUTO: 40.1 10E9/L (ref 4–11)

## 2020-01-01 PROCEDURE — 36415 COLL VENOUS BLD VENIPUNCTURE: CPT | Performed by: HOSPITALIST

## 2020-01-01 PROCEDURE — 83735 ASSAY OF MAGNESIUM: CPT | Performed by: STUDENT IN AN ORGANIZED HEALTH CARE EDUCATION/TRAINING PROGRAM

## 2020-01-01 PROCEDURE — 40000559 ZZH STATISTIC FAILED PERIPHERAL IV START

## 2020-01-01 PROCEDURE — P9047 ALBUMIN (HUMAN), 25%, 50ML: HCPCS | Performed by: INTERNAL MEDICINE

## 2020-01-01 PROCEDURE — 25000132 ZZH RX MED GY IP 250 OP 250 PS 637: Mod: GY | Performed by: INTERNAL MEDICINE

## 2020-01-01 PROCEDURE — 25000125 ZZHC RX 250: Performed by: PHYSICIAN ASSISTANT

## 2020-01-01 PROCEDURE — 25800030 ZZH RX IP 258 OP 636: Performed by: PHYSICIAN ASSISTANT

## 2020-01-01 PROCEDURE — 82805 BLOOD GASES W/O2 SATURATION: CPT | Performed by: STUDENT IN AN ORGANIZED HEALTH CARE EDUCATION/TRAINING PROGRAM

## 2020-01-01 PROCEDURE — 84484 ASSAY OF TROPONIN QUANT: CPT | Performed by: STUDENT IN AN ORGANIZED HEALTH CARE EDUCATION/TRAINING PROGRAM

## 2020-01-01 PROCEDURE — 00000146 ZZHCL STATISTIC GLUCOSE BY METER IP

## 2020-01-01 PROCEDURE — 36415 COLL VENOUS BLD VENIPUNCTURE: CPT | Performed by: PHYSICIAN ASSISTANT

## 2020-01-01 PROCEDURE — 85610 PROTHROMBIN TIME: CPT | Performed by: STUDENT IN AN ORGANIZED HEALTH CARE EDUCATION/TRAINING PROGRAM

## 2020-01-01 PROCEDURE — 84540 ASSAY OF URINE/UREA-N: CPT | Performed by: PHYSICIAN ASSISTANT

## 2020-01-01 PROCEDURE — 36415 COLL VENOUS BLD VENIPUNCTURE: CPT | Performed by: INTERNAL MEDICINE

## 2020-01-01 PROCEDURE — 83605 ASSAY OF LACTIC ACID: CPT | Performed by: STUDENT IN AN ORGANIZED HEALTH CARE EDUCATION/TRAINING PROGRAM

## 2020-01-01 PROCEDURE — 74018 RADEX ABDOMEN 1 VIEW: CPT

## 2020-01-01 PROCEDURE — 92526 ORAL FUNCTION THERAPY: CPT | Mod: GN

## 2020-01-01 PROCEDURE — 40000141 ZZH STATISTIC PERIPHERAL IV START W/O US GUIDANCE

## 2020-01-01 PROCEDURE — 25000132 ZZH RX MED GY IP 250 OP 250 PS 637: Mod: GY | Performed by: PHYSICIAN ASSISTANT

## 2020-01-01 PROCEDURE — 85027 COMPLETE CBC AUTOMATED: CPT | Performed by: STUDENT IN AN ORGANIZED HEALTH CARE EDUCATION/TRAINING PROGRAM

## 2020-01-01 PROCEDURE — 80048 BASIC METABOLIC PNL TOTAL CA: CPT | Performed by: HOSPITALIST

## 2020-01-01 PROCEDURE — 74177 CT ABD & PELVIS W/CONTRAST: CPT

## 2020-01-01 PROCEDURE — 12000012 ZZH R&B MS OVERFLOW UMMC

## 2020-01-01 PROCEDURE — 82803 BLOOD GASES ANY COMBINATION: CPT | Performed by: HOSPITALIST

## 2020-01-01 PROCEDURE — 99207 ZZC CDG-MDM COMPONENT: MEETS MODERATE - UP CODED: CPT | Performed by: HOSPITALIST

## 2020-01-01 PROCEDURE — 40000671 ZZH STATISTIC ANESTHESIA CASE

## 2020-01-01 PROCEDURE — 85027 COMPLETE CBC AUTOMATED: CPT | Performed by: PHYSICIAN ASSISTANT

## 2020-01-01 PROCEDURE — 82803 BLOOD GASES ANY COMBINATION: CPT | Performed by: STUDENT IN AN ORGANIZED HEALTH CARE EDUCATION/TRAINING PROGRAM

## 2020-01-01 PROCEDURE — 84100 ASSAY OF PHOSPHORUS: CPT | Performed by: PHYSICIAN ASSISTANT

## 2020-01-01 PROCEDURE — 80053 COMPREHEN METABOLIC PANEL: CPT | Performed by: PHYSICIAN ASSISTANT

## 2020-01-01 PROCEDURE — 40000802 ZZH SITE CHECK

## 2020-01-01 PROCEDURE — 25000132 ZZH RX MED GY IP 250 OP 250 PS 637: Mod: GY | Performed by: NURSE PRACTITIONER

## 2020-01-01 PROCEDURE — 85610 PROTHROMBIN TIME: CPT | Performed by: PHYSICIAN ASSISTANT

## 2020-01-01 PROCEDURE — 40000275 ZZH STATISTIC RCP TIME EA 10 MIN

## 2020-01-01 PROCEDURE — 25000132 ZZH RX MED GY IP 250 OP 250 PS 637: Mod: GY | Performed by: ANESTHESIOLOGY

## 2020-01-01 PROCEDURE — 99233 SBSQ HOSP IP/OBS HIGH 50: CPT | Performed by: HOSPITALIST

## 2020-01-01 PROCEDURE — 93005 ELECTROCARDIOGRAM TRACING: CPT

## 2020-01-01 PROCEDURE — 25000125 ZZHC RX 250: Performed by: NURSE ANESTHETIST, CERTIFIED REGISTERED

## 2020-01-01 PROCEDURE — 87040 BLOOD CULTURE FOR BACTERIA: CPT | Performed by: PHYSICIAN ASSISTANT

## 2020-01-01 PROCEDURE — 85730 THROMBOPLASTIN TIME PARTIAL: CPT | Performed by: STUDENT IN AN ORGANIZED HEALTH CARE EDUCATION/TRAINING PROGRAM

## 2020-01-01 PROCEDURE — 83605 ASSAY OF LACTIC ACID: CPT | Performed by: INTERNAL MEDICINE

## 2020-01-01 PROCEDURE — P9047 ALBUMIN (HUMAN), 25%, 50ML: HCPCS | Performed by: PHYSICIAN ASSISTANT

## 2020-01-01 PROCEDURE — 12000004 ZZH R&B IMCU UMMC

## 2020-01-01 PROCEDURE — 84295 ASSAY OF SERUM SODIUM: CPT | Performed by: STUDENT IN AN ORGANIZED HEALTH CARE EDUCATION/TRAINING PROGRAM

## 2020-01-01 PROCEDURE — 25000128 H RX IP 250 OP 636: Performed by: HOSPITALIST

## 2020-01-01 PROCEDURE — 25000125 ZZHC RX 250: Performed by: HOSPITALIST

## 2020-01-01 PROCEDURE — 25000128 H RX IP 250 OP 636: Performed by: PHYSICIAN ASSISTANT

## 2020-01-01 PROCEDURE — 83605 ASSAY OF LACTIC ACID: CPT | Performed by: HOSPITALIST

## 2020-01-01 PROCEDURE — 25000128 H RX IP 250 OP 636: Performed by: NURSE PRACTITIONER

## 2020-01-01 PROCEDURE — 84100 ASSAY OF PHOSPHORUS: CPT | Performed by: STUDENT IN AN ORGANIZED HEALTH CARE EDUCATION/TRAINING PROGRAM

## 2020-01-01 PROCEDURE — 99207 ZZC APP CREDIT; MD BILLING SHARED VISIT: CPT | Performed by: PHYSICIAN ASSISTANT

## 2020-01-01 PROCEDURE — 80048 BASIC METABOLIC PNL TOTAL CA: CPT | Performed by: STUDENT IN AN ORGANIZED HEALTH CARE EDUCATION/TRAINING PROGRAM

## 2020-01-01 PROCEDURE — 82330 ASSAY OF CALCIUM: CPT | Performed by: STUDENT IN AN ORGANIZED HEALTH CARE EDUCATION/TRAINING PROGRAM

## 2020-01-01 PROCEDURE — 83605 ASSAY OF LACTIC ACID: CPT

## 2020-01-01 PROCEDURE — 25800030 ZZH RX IP 258 OP 636: Performed by: HOSPITALIST

## 2020-01-01 PROCEDURE — 85014 HEMATOCRIT: CPT | Performed by: STUDENT IN AN ORGANIZED HEALTH CARE EDUCATION/TRAINING PROGRAM

## 2020-01-01 PROCEDURE — 82947 ASSAY GLUCOSE BLOOD QUANT: CPT | Performed by: STUDENT IN AN ORGANIZED HEALTH CARE EDUCATION/TRAINING PROGRAM

## 2020-01-01 PROCEDURE — 84300 ASSAY OF URINE SODIUM: CPT | Performed by: PHYSICIAN ASSISTANT

## 2020-01-01 PROCEDURE — 25000128 H RX IP 250 OP 636: Performed by: INTERNAL MEDICINE

## 2020-01-01 PROCEDURE — 40000560 ZZH STATISTIC CODE BLUE ACCESS REQUIRED

## 2020-01-01 PROCEDURE — 25000125 ZZHC RX 250

## 2020-01-01 PROCEDURE — 84132 ASSAY OF SERUM POTASSIUM: CPT | Performed by: STUDENT IN AN ORGANIZED HEALTH CARE EDUCATION/TRAINING PROGRAM

## 2020-01-01 PROCEDURE — 40000556 ZZH STATISTIC PERIPHERAL IV START W US GUIDANCE

## 2020-01-01 RX ORDER — LANOLIN ALCOHOL/MO/W.PET/CERES
1000 CREAM (GRAM) TOPICAL DAILY
COMMUNITY

## 2020-01-01 RX ORDER — LANOLIN ALCOHOL/MO/W.PET/CERES
6 CREAM (GRAM) TOPICAL AT BEDTIME
Status: DISCONTINUED | OUTPATIENT
Start: 2020-01-01 | End: 2020-01-01 | Stop reason: HOSPADM

## 2020-01-01 RX ORDER — DEXTROSE MONOHYDRATE 50 MG/ML
INJECTION, SOLUTION INTRAVENOUS
Status: DISPENSED
Start: 2020-01-01 | End: 2020-01-01

## 2020-01-01 RX ORDER — ALBUMIN (HUMAN) 12.5 G/50ML
100 SOLUTION INTRAVENOUS DAILY
Status: DISCONTINUED | OUTPATIENT
Start: 2020-01-01 | End: 2020-01-01 | Stop reason: HOSPADM

## 2020-01-01 RX ORDER — INSULIN GLARGINE 100 [IU]/ML
15 INJECTION, SOLUTION SUBCUTANEOUS AT BEDTIME
COMMUNITY

## 2020-01-01 RX ORDER — SODIUM CHLORIDE, SODIUM LACTATE, POTASSIUM CHLORIDE, CALCIUM CHLORIDE 600; 310; 30; 20 MG/100ML; MG/100ML; MG/100ML; MG/100ML
INJECTION, SOLUTION INTRAVENOUS CONTINUOUS
Status: DISCONTINUED | OUTPATIENT
Start: 2020-01-01 | End: 2020-01-01 | Stop reason: HOSPADM

## 2020-01-01 RX ORDER — UBIDECARENONE 100 MG
100 CAPSULE ORAL DAILY
COMMUNITY

## 2020-01-01 RX ORDER — TEMAZEPAM 7.5 MG/1
7.5 CAPSULE ORAL
COMMUNITY

## 2020-01-01 RX ORDER — FOLIC ACID 1 MG/1
1 TABLET ORAL DAILY
COMMUNITY

## 2020-01-01 RX ORDER — IOPAMIDOL 755 MG/ML
108 INJECTION, SOLUTION INTRAVASCULAR ONCE
Status: COMPLETED | OUTPATIENT
Start: 2020-01-01 | End: 2020-01-01

## 2020-01-01 RX ORDER — OLANZAPINE 10 MG/2ML
5 INJECTION, POWDER, FOR SOLUTION INTRAMUSCULAR ONCE
Status: DISCONTINUED | OUTPATIENT
Start: 2020-01-01 | End: 2020-01-01 | Stop reason: HOSPADM

## 2020-01-01 RX ORDER — FLUCONAZOLE 2 MG/ML
200 INJECTION, SOLUTION INTRAVENOUS ONCE
Status: DISCONTINUED | OUTPATIENT
Start: 2020-01-01 | End: 2020-01-01

## 2020-01-01 RX ORDER — TRAZODONE HYDROCHLORIDE 100 MG/1
100 TABLET ORAL AT BEDTIME
Status: DISCONTINUED | OUTPATIENT
Start: 2020-01-01 | End: 2020-01-01 | Stop reason: HOSPADM

## 2020-01-01 RX ORDER — VANCOMYCIN HYDROCHLORIDE 50 MG/ML
125 KIT ORAL 4 TIMES DAILY
Status: DISCONTINUED | OUTPATIENT
Start: 2020-01-01 | End: 2020-01-01

## 2020-01-01 RX ORDER — FUROSEMIDE 10 MG/ML
20 INJECTION INTRAMUSCULAR; INTRAVENOUS ONCE
Status: COMPLETED | OUTPATIENT
Start: 2020-01-01 | End: 2020-01-01

## 2020-01-01 RX ORDER — DEXTROSE MONOHYDRATE 50 MG/ML
INJECTION, SOLUTION INTRAVENOUS CONTINUOUS
Status: DISCONTINUED | OUTPATIENT
Start: 2020-01-01 | End: 2020-01-01

## 2020-01-01 RX ORDER — CHOLECALCIFEROL (VITAMIN D3) 50 MCG
1 TABLET ORAL DAILY
COMMUNITY

## 2020-01-01 RX ORDER — ALBUMIN (HUMAN) 12.5 G/50ML
50 SOLUTION INTRAVENOUS ONCE
Status: COMPLETED | OUTPATIENT
Start: 2020-01-01 | End: 2020-01-01

## 2020-01-01 RX ORDER — FAMOTIDINE 40 MG/1
40 TABLET, FILM COATED ORAL EVERY EVENING
COMMUNITY

## 2020-01-01 RX ORDER — VANCOMYCIN HYDROCHLORIDE 50 MG/ML
500 KIT ORAL 4 TIMES DAILY
Status: DISCONTINUED | OUTPATIENT
Start: 2020-01-01 | End: 2020-01-01 | Stop reason: HOSPADM

## 2020-01-01 RX ORDER — MEROPENEM 1 G/1
1 INJECTION, POWDER, FOR SOLUTION INTRAVENOUS EVERY 8 HOURS
Status: DISCONTINUED | OUTPATIENT
Start: 2020-01-01 | End: 2020-01-01 | Stop reason: HOSPADM

## 2020-01-01 RX ORDER — OMEPRAZOLE 40 MG/1
40 CAPSULE, DELAYED RELEASE ORAL EVERY MORNING
COMMUNITY

## 2020-01-01 RX ORDER — EPHEDRINE SULFATE 50 MG/ML
INJECTION, SOLUTION INTRAMUSCULAR; INTRAVENOUS; SUBCUTANEOUS PRN
Status: DISCONTINUED | OUTPATIENT
Start: 2020-01-01 | End: 2020-01-01

## 2020-01-01 RX ORDER — ALBUTEROL SULFATE 90 UG/1
2 AEROSOL, METERED RESPIRATORY (INHALATION) EVERY 6 HOURS PRN
COMMUNITY

## 2020-01-01 RX ORDER — NALOXONE HYDROCHLORIDE 0.4 MG/ML
.1-.4 INJECTION, SOLUTION INTRAMUSCULAR; INTRAVENOUS; SUBCUTANEOUS
Status: CANCELLED | OUTPATIENT
Start: 2020-01-01

## 2020-01-01 RX ADMIN — IOPAMIDOL 108 ML: 755 INJECTION, SOLUTION INTRAVENOUS at 19:40

## 2020-01-01 RX ADMIN — OLANZAPINE 5 MG: 5 TABLET, ORALLY DISINTEGRATING ORAL at 22:30

## 2020-01-01 RX ADMIN — METRONIDAZOLE 500 MG: 500 INJECTION, SOLUTION INTRAVENOUS at 01:43

## 2020-01-01 RX ADMIN — LACTULOSE 20 G: 20 SOLUTION ORAL at 20:00

## 2020-01-01 RX ADMIN — DEXTROSE MONOHYDRATE: 50 INJECTION, SOLUTION INTRAVENOUS at 23:07

## 2020-01-01 RX ADMIN — METRONIDAZOLE 500 MG: 500 INJECTION, SOLUTION INTRAVENOUS at 17:04

## 2020-01-01 RX ADMIN — LEVOTHYROXINE SODIUM 50 MCG: 50 TABLET ORAL at 07:41

## 2020-01-01 RX ADMIN — CEFTRIAXONE 2 G: 2 INJECTION, POWDER, FOR SOLUTION INTRAMUSCULAR; INTRAVENOUS at 16:25

## 2020-01-01 RX ADMIN — SODIUM BICARBONATE 50 MEQ: 84 INJECTION, SOLUTION INTRAVENOUS at 13:00

## 2020-01-01 RX ADMIN — HUMAN INSULIN 6 UNITS/HR: 100 INJECTION, SOLUTION SUBCUTANEOUS at 08:09

## 2020-01-01 RX ADMIN — PANTOPRAZOLE SODIUM 40 MG: 40 TABLET, DELAYED RELEASE ORAL at 07:41

## 2020-01-01 RX ADMIN — LEVOTHYROXINE SODIUM 50 MCG: 50 TABLET ORAL at 08:10

## 2020-01-01 RX ADMIN — DEXTROSE MONOHYDRATE: 50 INJECTION, SOLUTION INTRAVENOUS at 11:04

## 2020-01-01 RX ADMIN — HUMAN INSULIN 8 UNITS/HR: 100 INJECTION, SOLUTION SUBCUTANEOUS at 02:21

## 2020-01-01 RX ADMIN — Medication 2 TABLET: at 07:41

## 2020-01-01 RX ADMIN — OLANZAPINE 5 MG: 5 TABLET, ORALLY DISINTEGRATING ORAL at 21:21

## 2020-01-01 RX ADMIN — Medication 15 MG: at 14:02

## 2020-01-01 RX ADMIN — MELATONIN TAB 3 MG 6 MG: 3 TAB at 00:26

## 2020-01-01 RX ADMIN — ROCURONIUM BROMIDE 50 MG: 10 INJECTION INTRAVENOUS at 14:05

## 2020-01-01 RX ADMIN — METRONIDAZOLE 500 MG: 500 INJECTION, SOLUTION INTRAVENOUS at 09:26

## 2020-01-01 RX ADMIN — OXYCODONE HYDROCHLORIDE 5 MG: 5 TABLET ORAL at 18:07

## 2020-01-01 RX ADMIN — LACTULOSE 20 G: 20 SOLUTION ORAL at 08:09

## 2020-01-01 RX ADMIN — SODIUM CHLORIDE, POTASSIUM CHLORIDE, SODIUM LACTATE AND CALCIUM CHLORIDE: 600; 310; 30; 20 INJECTION, SOLUTION INTRAVENOUS at 10:09

## 2020-01-01 RX ADMIN — LACTULOSE 20 G: 20 SOLUTION ORAL at 07:42

## 2020-01-01 RX ADMIN — OLANZAPINE 5 MG: 5 TABLET, ORALLY DISINTEGRATING ORAL at 11:19

## 2020-01-01 RX ADMIN — CYCLOBENZAPRINE HYDROCHLORIDE 10 MG: 10 TABLET, FILM COATED ORAL at 18:07

## 2020-01-01 RX ADMIN — FUROSEMIDE 40 MG: 40 TABLET ORAL at 07:41

## 2020-01-01 RX ADMIN — TRAZODONE HYDROCHLORIDE 50 MG: 50 TABLET ORAL at 21:21

## 2020-01-01 RX ADMIN — LACTULOSE 20 G: 20 SOLUTION ORAL at 13:26

## 2020-01-01 RX ADMIN — MELATONIN TAB 3 MG 6 MG: 3 TAB at 21:21

## 2020-01-01 RX ADMIN — FERROUS SULFATE TAB 325 MG (65 MG ELEMENTAL FE) 325 MG: 325 (65 FE) TAB at 07:40

## 2020-01-01 RX ADMIN — OXYCODONE HYDROCHLORIDE 5 MG: 5 TABLET ORAL at 23:54

## 2020-01-01 RX ADMIN — MAGNESIUM 64 MG (MAGNESIUM CHLORIDE) TABLET,DELAYED RELEASE 535 MG: at 07:41

## 2020-01-01 RX ADMIN — MEROPENEM 1 G: 1 INJECTION, POWDER, FOR SOLUTION INTRAVENOUS at 13:11

## 2020-01-01 RX ADMIN — LIDOCAINE 1 PATCH: 560 PATCH PERCUTANEOUS; TOPICAL; TRANSDERMAL at 20:09

## 2020-01-01 RX ADMIN — FUROSEMIDE 40 MG: 40 TABLET ORAL at 08:10

## 2020-01-01 RX ADMIN — METRONIDAZOLE 500 MG: 500 INJECTION, SOLUTION INTRAVENOUS at 01:16

## 2020-01-01 RX ADMIN — CYCLOBENZAPRINE HYDROCHLORIDE 10 MG: 10 TABLET, FILM COATED ORAL at 11:04

## 2020-01-01 RX ADMIN — RIFAXIMIN 550 MG: 550 TABLET ORAL at 08:09

## 2020-01-01 RX ADMIN — PANTOPRAZOLE SODIUM 40 MG: 40 TABLET, DELAYED RELEASE ORAL at 16:27

## 2020-01-01 RX ADMIN — OLANZAPINE 5 MG: 5 TABLET, ORALLY DISINTEGRATING ORAL at 11:04

## 2020-01-01 RX ADMIN — METRONIDAZOLE 500 MG: 500 INJECTION, SOLUTION INTRAVENOUS at 11:13

## 2020-01-01 RX ADMIN — RIFAXIMIN 550 MG: 550 TABLET ORAL at 19:59

## 2020-01-01 RX ADMIN — FUROSEMIDE 20 MG: 20 TABLET ORAL at 15:11

## 2020-01-01 RX ADMIN — DEXTROSE MONOHYDRATE: 50 INJECTION, SOLUTION INTRAVENOUS at 16:30

## 2020-01-01 RX ADMIN — PANTOPRAZOLE SODIUM 40 MG: 40 TABLET, DELAYED RELEASE ORAL at 08:10

## 2020-01-01 RX ADMIN — ALBUMIN HUMAN 50 G: 0.25 SOLUTION INTRAVENOUS at 07:51

## 2020-01-01 RX ADMIN — FERROUS SULFATE TAB 325 MG (65 MG ELEMENTAL FE) 325 MG: 325 (65 FE) TAB at 19:59

## 2020-01-01 RX ADMIN — Medication 2 TABLET: at 08:09

## 2020-01-01 RX ADMIN — Medication 2 TABLET: at 20:00

## 2020-01-01 RX ADMIN — ALBUMIN HUMAN 100 G: 0.25 SOLUTION INTRAVENOUS at 10:57

## 2020-01-01 RX ADMIN — HUMAN INSULIN 4 UNITS/HR: 100 INJECTION, SOLUTION SUBCUTANEOUS at 11:19

## 2020-01-01 RX ADMIN — HUMAN INSULIN 10 UNITS/HR: 100 INJECTION, SOLUTION SUBCUTANEOUS at 23:40

## 2020-01-01 RX ADMIN — RIFAXIMIN 550 MG: 550 TABLET ORAL at 07:41

## 2020-01-01 RX ADMIN — HUMAN INSULIN 10 UNITS/HR: 100 INJECTION, SOLUTION SUBCUTANEOUS at 12:20

## 2020-01-01 RX ADMIN — FUROSEMIDE 20 MG: 10 INJECTION, SOLUTION INTRAVENOUS at 21:06

## 2020-01-01 RX ADMIN — LACTULOSE 20 G: 20 SOLUTION ORAL at 22:29

## 2020-01-01 ASSESSMENT — ACTIVITIES OF DAILY LIVING (ADL)
ADLS_ACUITY_SCORE: 17
ADLS_ACUITY_SCORE: 11.5
ADLS_ACUITY_SCORE: 17
ADLS_ACUITY_SCORE: 13.5
ADLS_ACUITY_SCORE: 17
ADLS_ACUITY_SCORE: 11.5
ADLS_ACUITY_SCORE: 17
ADLS_ACUITY_SCORE: 17

## 2020-01-01 ASSESSMENT — PAIN DESCRIPTION - DESCRIPTORS
DESCRIPTORS: ACHING
DESCRIPTORS: TIGHTNESS

## 2020-01-01 NOTE — PROVIDER NOTIFICATION
-------------------CRITICAL LAB VALUE-------------------    Lab Value: LA - 3.2  Time of notification: 1:00 PM  MD notified: Gold 4  Patient status:   Temp:  [97.4  F (36.3  C)-98.2  F (36.8  C)] 97.6  F (36.4  C)  Pulse:  [121-130] 121  Heart Rate:  [115-127] 120  Resp:  [18-20] 20  BP: (114-140)/(65-93) 129/79  SpO2:  [96 %-100 %] 96 %  Orders received: No new orders at this time. Will continue with POC and notify team with any changes.

## 2020-01-01 NOTE — PLAN OF CARE
Discharge Planner OT   Patient plan for discharge: Not discussed.   Current status: Pt supine inclined in bed upon arrival. Pt agreeable to therapy session. Pt very difficult to direct and tangential throughout therapy session. Pt required CGA/Min A and vc's for transfer supine<->seated EOB and sit<->standing. Pt completed BUE AROM exercises x 15 reps each motion with vc's, demo and some [place] A for proper completion of exercise motions. Pt with limited tolerance this session. VSS.   Barriers to return to prior living situation: Decreased independence with functional transfers/ADLs, Decreased strength/endurance, Impaired cognition.   Recommendations for discharge: TCU  Rationale for recommendations: Current level of function.        Entered by: Dean Gonsalez 12/31/2019 10:46 PM

## 2020-01-01 NOTE — PROGRESS NOTES
[facility] Progress Note - Hospitalist Service       Hospital day #8          ASSESSMENT & PLAN     Charanjit Ibarra is a 59 year old male with history of alcoholic cirrhosis, esophageal varices, possible HCC, and DM2 who was transferred from OSH on 12/24/19 with SBP, E.coli bacteremia, cholecystitis, and worsening liver failure.     # Acute delirium.  Etiology unclear, possibly multifactorial with infection, hospital environment, and insomnia contributing. No asterixis on exam, therefore HE less likely. Unclear if mild acidosis and hypernatremia could be contributing. Currently no evidence of sepsis and getting appropriate antibiotics for known infections. Afebrile but with rising WBC 14.4 today. No signs/sxs of new infectious process, however patient not a reliable historian. Slight improved per RN, although still not sleeping at night. Appreciate psych input.   - CT CAP ordered to further evaluate possible source of infection  - Continue trazodone 50mg and zyprexa 5mg at bedtime   - Schedule Melatonin 10mg at bedtime   - Zyprexa 5mg q 6h prn  - [place] protocol for worsening confusion and evidence of hepatic encephalopathy     # Sepsis d/t E.coli bacteremia and SBP.  Diagnosed at OSH. Repeat paracentesis 12/25 with 11k PMNs with cultures growing pansensitive E.coli. Interval para 12/27 and 12/30 reveal improving PMN counts and culture negative. ID consulted.  Initially treated with ertapenem. Transitioned to Ceftriaxone + flagyl on 12/30. Repeat blood cultures NGTD since 12/25. Currently afebrile. WBC up to 14.4 today. Lactic acid 3.2 despite IVF and albumin.   - Monitor clinically  - Continue IV ceftriaxone + flagyl  - Repeat BC x 2 if febrile    # Decompensated ETOH cirrhosis.  C/b EV, refractory ascites, SBP and portal vein thrombosis (not on AC d/t bleeding risk). Stopped drinking 5 years ago. MELD 29. Transplant work up in process. Paracentesis  12/30 for 5 L with improvement in WBC noted.   - Daily weights  - Daily MELD labs  - Continue Lasix 40mg AM and 20mg PM per home regimen  - Continue to hold spironolactone pending renal recovery  - Continue PTA lactulose and rifaximin  - Will need OP dobutamine stress echo for transplant work up    # Acute cholecystitis.  Diagnosed at OSH. S/p ERCP with biliary stent placement 12/28. LFTs relatively stable, however noted to have rising WBC today. No focal RUQ tenderness on exam.   - Continue Ceftriaxone and flagyl as above  - CT AP to further evaluate gallbladder  - Repeat LFTs in AM    # Chronic anemia and thrombocytopenia.  Likely secondary to ESLD. Counts relatively stable. No evidence of active bleeding.  - Repeat CBC in AM    # Hypernatremia.  Secondary to poor PO intake and free water deficit. Na worse today despite replacing free water deficit with D5.   - Start D5 at 175 ml/h to replace free water defecit  - Repeat BMP in AM    # NAGMA.  Hyperchloremic. Etiology unclear. Only getting NS for TKO. Bicarb stable.  - Repeat BMP in AM    # Sinus tachycardia, Hx SVT.  Received adenosine 12/28 for SVT and subsequently converted to sinus tachycardia. Remains tachycardic but in NSR. Fluid status and infection likely contributing.   - Monitor    # Type II DM.  A1C 6.8% on 12/25/19. Hyperglycemic on admission with 's. Endocrine consulted and started on insulin drip. Glucose remains labile with addition of dextrose to IVF.   - Endocrine following  - Continue insulin drip for now  - Monitor electrolytes    # Hypothyroidism.  TSH 0.66 on 12/29. Continue levothyroxine 50mcg daily.      Diet: Orders Placed This Encounter      Dysphagia Diet Level 1 Pureed Nectar Thickened Liquids (pre-thickened or use instant food thickener)  DVT Prophylaxis: Pneumatic Compression Devices  Code Status: Full Code    Disposition Plan  Expected discharge: 4 - 7 days, recommended to TBD once mental status at baseline.  Entered: Alberto CAMARILLO  SHAYE Robins 01/01/2020 10:18 AM        The patient's care was discussed with the Attending Physician, Dr. Barber.    Alberto Robins PA-C  [facility], [facility] Service, Internal Medicine [facility]  Pg [phone]    ______  ________________________________________________________________    Interval History   Patient remains somewhat confused. RN reports no sleep overnight, although appears less anxious and agitated today. Patient reports feeling well, other than pain in his tailbone and neck from lying in bed. Pain is better with repositioning. He also notes dirt all over the walls.    He denies fevers, chills, cough, dyspnea, chest pain, nausea, vomiting, abdominal pain, diarrhea, or urinary complaints.       Data reviewed today: I personally reviewed all medications, new labs and imaging results over the last 24 hours.    Physical Exam   /85 (BP Location: Right arm)   Pulse 121   Temp 97.4  F (36.3  C) (Oral)   Resp 20   Wt 80.2 kg (176 lb 12.9 oz)   SpO2 96%   BMI 26.88 kg/m     General:  Awake and alert but disoriented and delirious. Possibly having visual hallucinations. NAD.     HEENT:  Mild scleral icterus. Mucous membranes moist.   Cardiovascular:  Tachy but regular. S1, S2. No murmur.   Respiratory:  Normal effort. Lungs CTAB. No wheezing, rhonchi or rales.  Gastrointestinal:  Abdomen soft but moderately distended. Active bowel sounds. No tenderness, guarding, or rebound. Splenomegaly noted.    Neurological:  Grossly non-focal. Moves all extremities. No asterixis.   Extremities:  No peripheral edema. No calf tenderness.   Skin:  Dry. No visible rash.    Data   Na 150, Cl 125, CO2 18, BUN 34, Cr 1.08, Bilirubin 13.2, AST 50, Lactate 2.7, WBC 14.4, Hgb 10.5, Platelets 76, INR 2.54    Remainder of labs normal

## 2020-01-01 NOTE — PROGRESS NOTES
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 He doesn't add any salt to foods and also gets a lot of fresh foods (ie homemade salsa and canned veggies from his mom). He did get the majority of his teeth pulled August-Sept 2017 and plans to get dentures soon, as he is limited somewhat in what he eats.     Appetite: baseline    Vitamins, Supplements, Pertinent Meds: MVI, CoQ10, iron, Mg++  Herbal Medicines/Supplements: none    Diet Recall  Breakfast Wheat chex, oatmeal, malt o meal with fruit or eggs or yogurt or smoothie   Lunch Ham and turkey s/w (1/2 at lunch, 1/2 a few hours later), fruit, cheese and crackers    Dinner Fresh veggies, meat, a lot of seafood, potatoes   Snacks none   Beverages Milk, lemon or other infused duvall, carbonated water, juice, 2x/week smoothie with protein powder, fruit, almond milk, yogurt, occasional soda/tea/Gatorade   Dining out <1x/month [place]      Physical Activity  Previously would do stretches and isometric activities but hasn't done these for >1 year d/t fatigue and loss of muscle mass (improving per pt report)     Anthropometrics  Height:   68 in   BMI:    27.9    Weight Status:Overweight BMI 25-29.9   Weight:  183 lbs            IBW (lb): 154  % IBW: 119    Wt Hx: -195 lbs, last weighed 1 year ago. He lost weight down to lowest of 160 lbs with noted moderate muscle loss but has since regained some weight.     Adj/dosing BW: 183 lbs/83 kg       Frailty Screening   Weakness Meets criteria for frailty if  strength (average of 3 trials, dominant hand) is:    Men  ?29 kg for BMI ?24  ?30 kg for BMI 24.1-26  ?30 kg for BMI 26.1-28  ?32 kg for  BMI >28 Women  ?17 kg for BMI ?23  ?17.3 kg for BMI 23.1-26  ?18 kg for BMI 26.1-29  ?21 kg for BMI >29    Equipment: Teodoro hand dynamometer  Participant attempts to squeeze the dynamometer maximally 3 times with the dominant hand.   Average of 3 trials: 29 kg with BMI of 27.9  Meets criteria for frailty based on handgrip strength: N    Malnutrition  % Intake: No decreased intake noted  % Weight Loss: Weight loss does not meet criteria for malnutrition - improving   Subcutaneous Fat Loss: Mild/Moderate  Muscle Loss: Moderate  Fluid Accumulation/Edema: None noted  Malnutrition Diagnosis: Non-Severe malnutrition in the context of chronic illness    Estimated Nutrition Needs  Energy  4659-3332     (25-30 kcal/kg for maintenance)     Protein      (1-1.2 g/kg for increased needs)           Fluid  1 ml/kcal or per MD        Micronutrient   Na+: <2000 mg/day            Nutrition Diagnosis  Food and nutrition related knowledge deficit r/t pre liver transplant eval AEB pt verbalized not hearing pre/post transplant diet guidelines.    Nutrition Intervention  Nutrition education provided:  Discussed sodium intake (low sodium foods and drinks, seasoning food without salt and tips for low sodium diet).    Reviewed adequate protein intake. Encouraged receiving protein from both animal and plant based sources. Encouraged pt take a protein shake every day instead of just 2 times/week. Reviewed other sources of protein to include in his daily eating - meat, chicken, fish, tuna fish or egg salad s/w, Greek yogurt, peanut butter/nuts, etc.     Reviewed post txp diet guidelines in brief (will review in further detail post txp):  (1) Review of proper food safety measures d/t immunosuppressant therapy post-op and increased risk for food-borne illness   (2) Stressed importance of not taking any herbal/Chinese/alternative medicines or supplements post txp (d/t risk for rejection, unknown effects on the organs, potential interactions  with immunosuppresants).   (3) Med regimen and possible side effects    Patient Understanding: Pt verbalized understanding of education provided.  Expected Compliance: Good  Follow-Up Plans: PRN     Nutrition Goals  1. Consistently take 1 protein shake daily instead of current consumption of 2x/week   2. Pt to verbalize understanding of 3 aspects of post txp education provided    Provided pt with contact info.   Nabila Jacobs RD, LD  [facility] [phone]                                 (2) cough or sneeze

## 2020-01-01 NOTE — PROGRESS NOTES
Brief Endocrine/Diabetes progress note:    Patient chart reviewed. BG is in good range and no change in current DM management.    Discussed with Dr.Zekarias Guru Jeanne MD  PGY 4 - Endocrinology Fellow  Pager: [phone]  Call ** [phone] then enter job code [phone] for on call person.

## 2020-01-01 NOTE — PHARMACY-ADMISSION MEDICATION HISTORY
Admission medication history interview status for the 12/24/2019 admission is complete. See Epic admission navigator for allergy information, pharmacy, prior to admission medications and immunization status.     Medication history interview sources:  wife, fill record    Changes made to PTA medication list (reason)  Added:     Temazepam, famotidine, albuterol, Novolog (per wife and fill record)    Saline nasal spray, vitamin D3, folic acid, vitamin B-12, CoQ-10 (per wife)    Deleted: oxycodone (per wife, patient was previously on a contract with the [facility], which stopped more than a month ago, and patient has not taken oxycodone for about a month)    Changed:     Omeprazole to 40 mg by mouth every morning (per wife)     Magnesium to 2 tablets once daily (per wife)    Additional medication history information (including reliability of information, actions taken by pharmacist):    Patient's wife is knowledgeable of his medications. Per her, he was taking all of his medications consistently before admission.    Per wife, patient was taking Basaglar 15 units at bedtime, and Novolog on a sliding scale.      Prior to Admission medications    Medication Sig Last Dose Taking? Auth Provider   albuterol (PROAIR HFA/PROVENTIL HFA/VENTOLIN HFA) 108 (90 Base) MCG/ACT inhaler Inhale 2 puffs into the lungs every 6 hours as needed for shortness of breath / dyspnea or wheezing PRN Yes Unknown, Entered By History   artificial saliva (BIOTENE MT) SOLN solution Swish and spit in mouth as needed for dry mouth PRN Yes Reported, Patient   calcium citrate-vitamin D (CALCIUM CITRATE + D3) 315-250 MG-UNIT TABS per tablet Take 2 tablets by mouth 2 times daily Past Week Yes Angelito Dietz MD   co-enzyme Q-10 100 MG CAPS capsule Take 100 mg by mouth daily Past Week Yes Unknown, Entered By History   cyanocobalamin (VITAMIN B-12) 1000 MCG tablet Take 1,000 mcg by mouth daily Past Week Yes Unknown, Entered By History    cyclobenzaprine (FLEXERIL) 10 MG tablet Take 1 Tab by mouth 3 times a day as needed for Muscle Spasms. PRN Yes Reported, Patient   diclofenac (VOLTAREN) 1 % GEL topical gel Place onto the skin daily as needed for moderate pain  PRN Yes Reported, Patient   famotidine (PEPCID) 40 MG tablet Take 40 mg by mouth every evening Past Week Yes Unknown, Entered By History   ferrous sulfate (IRON) 325 (65 FE) MG tablet Take 325 mg by mouth daily  Past Week Yes Reported, Patient   folic acid (FOLVITE) 1 MG tablet Take 1 mg by mouth daily Past Week Yes Unknown, Entered By History   furosemide (LASIX) 20 MG tablet Take 40 mg in a.m. and 20 mg in afternoon Past Week Yes Reported, Patient   insulin aspart (NOVOLOG PEN) 100 UNIT/ML pen Inject 1-3 Units Subcutaneous 3 times daily (with meals) 1 unit for -180, 2 units for 181-200, 3 units for 201-220 Past Week Yes Unknown, Entered By History   insulin glargine (BASAGLAR KWIKPEN) 100 UNIT/ML pen Inject 15 Units Subcutaneous At Bedtime Past Month Yes Unknown, Entered By History   lactulose (CHRONULAC) 10 GM/15ML solution Take 30 mL by mouth three times daily Past Week Yes Reported, Patient   levothyroxine (SYNTHROID/LEVOTHROID) 50 MCG tablet Take 50 mcg by mouth daily  Past Week Yes Reported, Patient   magnesium chloride 535 (64 Mg) MG TBEC CR tablet Take 1,070 mg by mouth daily Past Week Yes Unknown, Entered By History   modafinil (PROVIGIL) 100 MG tablet Take by mouth daily  Past Week Yes Reported, Patient   Multiple Vitamins-Minerals (MULTIVITAMIN PO) Take 1 tablet by mouth daily Past Week at Unknown time Yes Reported, Patient   omeprazole (PRILOSEC) 40 MG DR capsule Take 40 mg by mouth every morning Past Week Yes Unknown, Entered By History   propranolol (INDERAL) 10 MG tablet Take 10 mg by mouth 3 times daily  Past Week Yes Reported, Patient   rifaximin (XIFAXAN) 550 MG TABS tablet Take 550 mg by mouth 2 times daily  Past Week Yes Reported, Patient   sodium chloride (OCEAN)  0.65 % nasal spray Spray 1 spray into both nostrils daily as needed for congestion PRN Yes Unknown, Entered By History   spironolactone (ALDACTONE) 50 MG tablet Take 25 mg by mouth daily  Past Week Yes Reported, Patient   temazepam (RESTORIL) 7.5 MG capsule Take 7.5 mg by mouth nightly as needed for sleep Past Week Yes Unknown, Entered By History   vitamin D3 (CHOLECALCIFEROL) 2000 units (50 mcg) tablet Take 1 tablet by mouth daily Past Week Yes Unknown, Entered By History          Medication history completed by: Arti Mahoney, PharmD, BCPS  1/1/2020 3:17 PM

## 2020-01-01 NOTE — PROGRESS NOTES
Sepsis Evaluation Progress Note    I was called to see Charanjit Ibarra due to lactic acid elevation to 2.7. He is known to have an infection.     Physical Exam   Vital Signs:  Temp: 97.4  F (36.3  C) Temp src: Oral BP: 133/85 Pulse: 121 Heart Rate: 115 Resp: 20 SpO2: 96 % O2 Device: None (Room air)      Lab:  Lactic Acid   Date Value Ref Range Status   12/31/2019 2.7 (H) 0.7 - 2.0 mmol/L Final     Lactate for Sepsis Protocol   Date Value Ref Range Status   01/01/2020 2.7 (H) 0.7 - 2.0 mmol/L Final     Comment:     Critical result called to DUSTIN MORAES and read back by ABDON BRANNON AT 0640        The patient is at baseline mental status for this hospital stay, remains confused but stable findings on exam.     The rest of their physical exam is significant for continued moderate abdominal distension without tenderness to palpation, HR RRR and pulmonary exam with good air movement bilaterally and no wheezing on exam. Pt remains alert but not oriented, intermittently answers questions appropriately.     Assessment & Plan   Charanjit Ibarra meets SIRS criteria but does NOT have evidence of acute organ damage.  These vital sign, lab and physical exam findings are consistent with SEPSIS. Lactic acid elevation is in setting of reduced lactic acid clearance/consumption with liver dysfunction.    Sepsis Time-Zero (time Sepsis diagnosis confirmed): 7:30am  01/01/20    Anti-infectives (From now, onward)    Start     Dose/Rate Route Frequency Ordered Stop    12/30/19 1730  metroNIDAZOLE (FLAGYL) infusion 500 mg      500 mg  over 60 Minutes Intravenous EVERY 8 HOURS 12/30/19 1709 12/30/19 1715  cefTRIAXone (ROCEPHIN) 2 g vial to attach to  ml bag for ADULTS or NS 50 ml bag for PEDS      2 g  over 30 Minutes Intravenous EVERY 24 HOURS 12/30/19 1709 12/24/19 2015  rifaximin (XIFAXAN) tablet 550 mg      550 mg Oral 2 TIMES DAILY 12/24/19 2008          Current antibiotic coverage is appropriate for source of  infection.    Plan:   --albumin 50GM of 25% ordered x1  --repeat lactic acid ordered at 12PM    Disposition: The patient will remain on the current unit. We will continue to monitor this patient closely.  Leann Yost MD    Sepsis Criteria   Sepsis: 2+ SIRS criteria due to infection  Severe Sepsis: Sepsis AND 1+ new sign of acute organ dysfunction (Note: lactate >2 is organ dysfunction)  Septic Shock: Sepsis AND hypotension despite volume resuscitation with 30 ml/kg crystalloid

## 2020-01-01 NOTE — PLAN OF CARE
/89   Pulse 121   Temp 98.2  F (36.8  C) (Oral)   Resp 20   Wt 80.2 kg (176 lb 12.9 oz)   SpO2 98%   BMI 26.88 kg/m      Time: 9517-8687.  Reason for admission: Cirrhosis, bacterial peritonitis.     VS: VSS on RA with O2 sats in high 90s. Afebrile.   Activity: Up with assist x1, steady on feet. Bedside attendant present.   Neuros: A&Ox1-2, orientated to self, occasionally orientated to time. Garbled speech, no teeth. Confused, forgetful, occasionally pulling at lines, bedside sitter present. [place] score of one. Otherwise neuros intact. Denies pain. Lidocaine patches on lower back.   Cardiac: Sinus tach. -130s. Normotensive. Denies chest pain.   Respiratory: LS clear but diminished in bases. Denies SOB, slight AMBROSE. No cough.   GI/: Voiding without difficulty. Diarrhea, LBM 12/31, receiving scheduled lactulose, held evening stool softener. +BS, +gas. Denies nausea or vomiting.   Diet: DD1 with nectar thick liquids. Tolerating well, ate most of dinner. Insulin gtt running at 10 units/hr in algorithm 4, q1hr BG checks. CHO coverage insulin orders as well.   Skin: Jaundiced. Sclera yellow.   Lines: Left PIV infusing NS at 10mL/hr with insulin gtt. Left PIV SL'd.   Labs: WDL. BG ranging from 120s-180s.     New changes this shift/Plan: No new changes this shift. VSS on RA. Sinus tach. Remains confused, forgetful. Insulin gtt running in algo 4 at 10 units/hr. Tolerating diet. Bedside attendant present.   Will continue to monitor & follow POC.   Pt reports waking up from feeling a quick shock throughout his body. Pt states he thinks it was his ICD going off.

## 2020-01-02 NOTE — PROVIDER NOTIFICATION
20 1400   Call Information   Date of Call 20   Time of Call 1201   Name of person requesting the team Audrey   Title of person requesting team RN   RRT Arrival time 1205   Time RRT ended 1440   Reason for call   Type of RRT Adult   Sepsis Suspected Elevated Lactate level;Heart Rate > 100;WBC <4 or >12;RR > 20, SaO2 <90% OR increasing O2 need;CNS: Altered mental status;SPB <90 or MAP 40mmHG   Was patient transferred from the ED, ICU, or PACU within last 24 hours prior to RRT call? No   SBAR   Situation LA 13.8, WBC 36.7   Background admitted for SBP, spontaneous bacterial peritonitis   Notable History/Conditions Cancer;Diabetes;Organ failure;Recent surgery  (ETOH cirrhosis, hep C,  HCC)   Assessment confused, restless(had bee restless when Lactic was drawn) BP 88/52, , RR 40   Interventions Labs;IV fluids;Meds;O2 per N/C or mask   Patient Outcome   Patient Outcome Patient    RRT Team   Attending/Primary/Covering Physician MICU   Date Attending Physician notified 20   Time Attending Physician notified 1200   Physician(s) Miguel BISHOP   Lead RN Kaylyn SOLANO   RT Skip   Post RRT Intervention Assessment   Comments Code blue called for intubation. PEA arrest following intubation.

## 2020-01-02 NOTE — PROGRESS NOTES
Per IP team patient coded and passed a bit ago.    Patient's txp episode closed. No letter sent.

## 2020-01-02 NOTE — ANESTHESIA PROCEDURE NOTES
ANESTHESIOLOGY RESIDENT/CRNA INTUBATION NOTE  Indication for intubation: respiratory insufficiency, cardiac arrest, hemodynamic instability, altered level of consciousness.  Provider Ordering Intubation: Laura Obrien MD  History regarding the most recent potassium obtained: Yes  History regarding renal failure obtained: Yes  History of presence or absence of CVA/stroke was obtained: Yes  History of presence or absence of NM disorder obtained: Yes  Post Intubation:  No apparent complications, Sedation to be ordered by primary/ICU team, Primary/ICU team to review CXR, Vent settings by primary/ICU team, ETT secured and Report given to primary nurse and/or team  Pt received rocuronium for intubation.  CPR in progress. No propofol given. Discussed with CODE team that sedation should be started after ROSC.    Medications Administered  Rocuronium (ZEMURON) 10 mg/mL injection, 50 mg    Medication administration at: 1/2/2020 2:05 PM

## 2020-01-02 NOTE — SIGNIFICANT EVENT
SPIRITUAL HEALTH SERVICES Significant Event  Scientologist Sacrament of ANOINTING  [employer] [facility] 6B    PATIENT ANOINTED, given end-of-life prayers by Father Gonzalo Rylan 2020 per request of pt's girlfriend.    I offered supportive listening, normalization of feelings and affirmation of the love the pt's girlfriend had for the pt. The girlfriend and parent of the pt and I prayed bedside at the body of the  pt.     Gonzalo Fagan M.Div.     Pager [phone]

## 2020-01-02 NOTE — PHARMACY-VANCOMYCIN DOSING SERVICE
Pharmacy Vancomycin Initial Note  Date of Service 2020  Patient's  1960  59 year old, male    Indication: Sepsis    Current estimated CrCl = CrCl cannot be calculated (Unknown ideal weight.).    Creatinine for last 3 days  2019:  4:58 AM Creatinine 0.89 mg/dL  2020:  5:16 AM Creatinine 1.08 mg/dL; 10:42 PM Creatinine 1.07 mg/dL  2020:  5:18 AM Creatinine 1.28 mg/dL    Recent Vancomycin Level(s) for last 3 days  No results found for requested labs within last 72 hours.      Vancomycin IV Administrations (past 72 hours)      No vancomycin orders with administrations in past 72 hours.                Nephrotoxins and other renal medications (From now, onward)    Start     Dose/Rate Route Frequency Ordered Stop    20 1245  vancomycin 1500 mg in 0.9% NaCl 250 ml intermittent infusion 1,500 mg      1,500 mg  over 90 Minutes Intravenous EVERY 24 HOURS 20 1234      20 1245  vancomycin (VANCOCIN) 2,000 mg in sodium chloride 0.9 % 500 mL intermittent infusion      2,000 mg  over 2 Hours Intravenous ONCE 20 1233      20 1200  vancomycin (FIRVANQ) oral solution 125 mg      125 mg Oral 4 TIMES DAILY 20 0947      19 1600  [Held by provider]  furosemide (LASIX) tablet 20 mg     (Held by provider since Thu 2020 at 1226 by Alberto Robins PA-C. Reason: Acute Kidney Injury.)    20 mg Oral EVERY 24 HOURS 19 1258      19 1300  [Held by provider]  furosemide (LASIX) tablet 40 mg     (Held by provider since Thu 2020 at 1226 by Alberto Robins PAShraonaC. Reason: Acute Kidney Injury.)    40 mg Oral DAILY 19 1258            Contrast Orders - past 72 hours (72h ago, onward)    Start     Dose/Rate Route Frequency Ordered Stop    20  iopamidol (ISOVUE-370) solution 108 mL      108 mL Intravenous ONCE 20 1939 20                Plan:  1.  Give vancomycin 2000mg IV x1 now, then start 1500mg IV q24 hours  (conservatively dosing given bump in SCr today)  2.  Goal Trough Level: 15-20 mg/L   3.  Pharmacy will check trough levels as appropriate in 1-3 Days.    4. Serum creatinine levels will be ordered daily for the first week of therapy and at least twice weekly for subsequent weeks.    5. Blue method utilized to dose vancomycin therapy: Method 2    Jeff Freeman, KelseyD, BCPS

## 2020-01-02 NOTE — PROVIDER NOTIFICATION
-------------------CRITICAL LAB VALUE-------------------    Lab Value: lactic 13.8, pH 7.01 HCO 8  Time of notification: 12:21 PM  MD notified: Shayan BISHOP  Patient status: HR tachy 140-150, RR 30-40's, BP dropped to 97/61, pt confused and restless.    Temp:  [97.2  F (36.2  C)-98.9  F (37.2  C)] 98.9  F (37.2  C)  Pulse:  [134-163] 163  Heart Rate:  [116-151] 139  Resp:  [18-48] 44  BP: ()/(55-81) 97/61  SpO2:  [92 %-100 %] 95 %  Orders received:  Order placed for recheck Lactic, ABG ordered.  Respiratory aware, will bring Bipap.  No ICU beds available at this time.

## 2020-01-02 NOTE — PROGRESS NOTES
Critical lab values received from core lab by Gumaro Kline RN and No Maxwell RN:  INR 7.38, K+ 6.6, Hgb 5.0, pH 6.8, Lactic Acid of 18, and BiCarb of 10.  These labs were received post-mortem.

## 2020-01-02 NOTE — PROGRESS NOTES
Transfer orders to ICU were placed, MICU team at bedside while waiting for bed to be available.  Pt more confused and restless.  Placed pt on Bipap, poor fit and pt not tolerating having mask on, more restless with it.  ABG unable to be done due to agitation.  MICU team ordered IM Zyprexa to be given to help with agitation, prior to being given, decision was then made to call code and intubate pt at 1350.  Code team at bedside and then noted pt HR decreasing.  Pads placed and CPR started at 1355.  Pt coded and time of death called at 1421.  SO Michelle was in sunroom and updated by MD.  Pt Parents now at bedside, hospital  visited with them.   Waiting for eye donor referral call.  Family will take belongings home with them.     1750: pt taken to morgue by security.

## 2020-01-02 NOTE — PLAN OF CARE
Neuro: Oriented to Person and sometimes place, illogical and rambling speech. Reaching out and getting out of bed suddenly. Sitter at bedside   Cardiac: ST 130s-140s, service paged. VSS.   Respiratory: Sating  on RA.  GI/: Adequate urine output. BM X4  Diet/appetite: Tolerating DD2 diet. Small appetite   Activity:  Assist of x1-2 up to commode   Pain: Oxycodone given for back and abdomen pain  Skin: Yellow sclera and skin  LDA's: PIVx2 with insulin titratable drip and tko for antibiotics     Plan: Continue with POC. Notify primary team with changes.

## 2020-01-02 NOTE — PROGRESS NOTES
[facility] General ID Service: Follow Up Note      Patient:  Charanjit Ibarra   Date of birth 1960, Medical record number [id number]  Date of Visit:  01/02/2020  Date of Admission: 12/24/2019         Assessment and Recommendations:   ID Problem List:  1. Bacterial peritonitis, suspected secondary to cholecystitis  2. Cholecystitis  3. Decompensated ETOH cirrhosis, sober 3-4 yrs  4. Leukocytosis  5. AMELIA   6. Thrombocytopenia  7. Chronic pain due to DJD, managed at pain clinic      Recommendations:  - Agree with broadening antibiotic coverage. IV Meropenem, IV Flagyl, and PO vancomycin to cover empirically for C.diff as patient had colitis on imaging.  - Would stop IV vancomycin as abdominal sources are well covered and would help to avoid renal injury  - Recommend involving surgery to further discuss imaging and possible need for intervention      Discussion:  This is a 59 year old male with history significant for cirrhosis secondary to alcohol abuse, possible HCC, and DJD who presented to [facility] ([place], [place]) on 12/22 with abdominal pain. He was found to have SBP with ascites, paracentesis completed and culture of ascites fluid positive for pan-sensitive E.coli. Also found to have E.coli bacteremia (first/last positive 12/22). First negative appears to be 12/25 at [facility]. He was started on ciprofloxacin an ertapenem. Developed progressive abdominal distension, pain, and increase in bilirubin (5.4 to 16.9) with WBC also increasing to peak of 26.9 (12/27). CT with gall bladder sludge and wall thickening. HIDA findings consistent with cholecystitis, however study may be affected by underlying hepatocellular dysfunction. Underwent ERCP on 12/28/19 with obstructed gallbladder and cystic duct stent placed with drainage of pus.     Mr. Ibarra has been on IV ceftriaxone for pan-sensitive E.coli bacteremia and bacterial peritonitis and Flagyl for treatment of intra-abdominal anaerobes in setting of  cholecystitis. He demonstrated persistently elevated lactic acid level, which prompted primary team to order CT on 1/1/20, which demonstrated edematous rectosigmoid colon consistent with colitis vs secondary to ascites, intrahepatic portal vein thrombus with complete involvement of right portal vein, and distention of gallbladder improved from previous study. Acute decompensation on morning of 1/2/20 with HR to 150's, tachypnea, lactic acid 13.8, WBC 40.1. Total bilirubin rising, alk phos is stable. Per discussion with primary team, they are planning to broaden antibiotic coverage, agree with Meropenem, Flagyl, and empiric PO vancomycin. C.diff PCR has been ordered. Would stop empiric IV vancomycin as intra-abdominal pathogens well covered on Meropenem and Flagyl, and will help to preserve renal function. No need for fungal coverage at this point. Also recommend having surgical team involved. DDx includes obstructed cystic duct stent, necrotic or gangrenous gallbladder in setting of cholecystitis, ischemic bowel, post-ERCP pancreatitis, recurrent bacterial peritonitis.          I have seen this patient was staffed with Dr. Ching.  Don't hesitate to call with questions.         I have reviewed today's vital signs, medications, labs and imaging.  Nabila Gupta PA-C, Pager # [phone]            Interval History:     Initially seen around 9am, lying in bed on left side. Does not answer ROS questions. Cries out with palpation of abdomen.     Seen again around 11:30- More agitated, lying sideways in bed, and reaching for things that are not there. On second visit denies abdominal pain but groans with repositioning and is agitated, trying to get up and move around.         Review of Systems:   Unable to obtain due to patient status         Current Antimicrobials   Current:  Ceftriaxone (start 12/30)  Metronidazole (start 12/30)  Rifaximin (home med)    Prior:  Ertapenem (12/25-12/30)  Ciprofloxacin  (12/24-12/25)  Micafungin (12/27-12-28)  Vancomycin (12/27-12/28)         Physical Exam:   Ranges for vital signs:  Temp:  [97.2  F (36.2  C)-98.3  F (36.8  C)] 97.3  F (36.3  C)  Pulse:  [134-146] 146  Heart Rate:  [116-146] 146  Resp:  [18-22] 22  BP: (105-132)/(59-81) 113/59  SpO2:  [96 %-100 %] 99 %    Intake/Output Summary (Last 24 hours) at 12/30/2019 1045  Last data filed at 12/30/2019 1040  Gross per 24 hour   Intake 821.67 ml   Output 1100 ml   Net -278.33 ml     Exam:  GENERAL:  Tachypneic, lying on left side, toxic appearing.   ENT:  Head is normocephalic, atraumatic. Oropharynx is moist without exudates or ulcers.  EYES:  Eyes have icteric sclerae.    LUNGS:  Clear to auscultation without wheeze or rales.  CARDIOVASCULAR:  Tachycardic, +S1/S2.  ABDOMEN:  +distended, cries out with light to moderate palpation (9AM), R more tender than L.   EXT: Extremities warm and without edema.  SKIN:  Jaundiced. No acute rashes.  Line is in place without any surrounding erythema. +scattered ecchymosis on limbs.  NEUROLOGIC:  Awake. Moves all extremitites. Does not answer questions         Laboratory Data:   Reviewed.  Pertinent for:     12/27/19 C.diff toxin B PCR: negative    Culture data:  12/30/19 Peritoneal fluid aerobic culture: PENDING  12/27/19 Peritoneal fluid anaerobic culture: NGTD (preliminary)  12/27/19 Peritoneal fluid aerobic culture: NGTD (preliminary)  12/27/19 Blood culture x2 NGTD (preliminary)  12/26/19 Blood culture x2 NGTD (preliminary)  12/25/19 Peritoneal fluid: E.coli (intermediate: Levofloxacin)  12/25/19 Blood culture x2: No growth     12/25/2019 17:00 12/27/2019 10:00 12/30/2019 16:00   Body Fluid Analysis Source Ascites Ascites Ascites   Color Fluid Orange Orange Orange   Appearance Fluid Cloudy Cloudy Slightly Cloudy   WBC Fluid 62302 4137 743   % Lymphocytes Fluid 1 1 6   % Mono/Macro Fluid 15  30   % Neutrophils Fluid 84 90 64   % Other Cells Fluid  9    Albumin Fluid 1.4     Bilirubin  Fluid 3.8     Glucose Fluid 181     Glucose Fluid Source Ascites     Lactate Dehydrogenase Fluid 654     LD Fluid Source Ascites     Protein Total Fluid 2.5     Protein Total Fluid Source Ascites        12/31/2019 04:58 1/1/2020 05:16 1/2/2020 05:18 1/2/2020 09:05   WBC 10.8 14.4 (H) 40.1 (H) 36.7 (H)   Hemoglobin 9.6 (L) 10.5 (L) 8.5 (L) 8.0 (L)   Hematocrit 29.6 (L) 33.2 (L) 26.4 (L) 26.7 (L)   Platelet Count 80 (L) 76 (L) 253 230   RBC Count 2.82 (L) 3.05 (L) 2.42 (L) 2.32 (L)    (H) 109 (H) 109 (H) 115 (H)   MCH 34.0 (H) 34.4 (H) 35.1 (H) 34.5 (H)   MCHC 32.4 31.6 32.2 30.0 (L)   RDW 19.1 (H) 20.4 (H) 21.1 (H) 22.1 (H)      1/1/2020 05:16 1/1/2020 22:42 1/2/2020 05:18   Sodium 150 (H) 144 146 (H)   Potassium 3.9 3.4 3.7   Chloride 125 (H) 119 (H) 120 (H)   Carbon Dioxide 18 (L) 16 (L) 14 (L)   Urea Nitrogen 34 (H) 32 (H) 34 (H)   Creatinine 1.08 1.07 1.28 (H)   GFR Estimate 74 75 60 (L)   GFR Estimate If Black 85 86 70   Calcium 10.2 (H) 9.0 8.8   Anion Gap 7 9 13          Imaging:   CT CHEST/ABD/PELVIS (1/1/20)  Per radiologist report  IMPRESSION:  1. Moderate volume ascites with mild heterogenous appearance and  peritoneal thickening in the pelvis findings compatible with culture  proven SBP.  2. Thick and edematous rectosigmoid colon, likely secondary to  surrounding ascites, however cannot exclude superimposed colitis.  3. Status post biliary drain placement with improved distention of the  gallbladder.   4. Cirrhotic liver with evidence of portal hypertension and large  splenic and gastric varices.  5. Increase in intrahepatic portal vein thrombus with complete  involvement of the right portal vein.  6. Redemonstration of bibasilar consolidative opacities/platelike  atelectasis.    HIDA (12/26/19)  Impression:  1. Nonvisualization gallbladder is consistent in the proper clinical  setting with cholecystitis.  2. Underlying hepatocellular dysfunction with reduction of clearance  of the radionuclide  from the blood pool.    Ct abdomen & pelvis w/contrast (12/25/19)  Per radiologist report  IMPRESSION:  1. Changes from chronic liver disease with cirrhotic and macronodular  liver. Portal venous hypertension with splenomegaly and splenic  varices. Branch portal vein thrombus in the posterior right portal  vein. There are also varices in the anterior abdominal wall.  2. Large volume simple ascites in all 4 quadrants. In the pelvis there  is a small amount of enhancement about the simple appearing fluid  indicating some loculated component. Findings consistent with recent  ascites fluid analysis suggesting SBP.  3. Distended gallbladder measuring up to 12.2 cm in length. Possible  gallbladder wall thickening although this may be secondary to large  volume ascites. Ultrasound and CT performed at outside hospital  12/23/2019 also showed distended gallbladder with sludge and stones.  Unable to rule out acute cholecystitis.  4. Some loops of bowel within the lower anterior abdomen demonstrate a  slightly thickened appearance which may be reactive to the ascites. No  evidence for bowel obstruction  5. Anterior abdominal wall hernia with ascitic fluid in the anterior  abdominal wall. There is also a right inguinal canal hernia with  fluid.

## 2020-01-02 NOTE — PROVIDER NOTIFICATION
Time of notification: 6:40 AM  Provider notified: Gold 4   Patient status: Elevated Heart rate in 150s-160s  Temp:  [97.2  F (36.2  C)-98.3  F (36.8  C)] 97.2  F (36.2  C)  Pulse:  [134-135] 134  Heart Rate:  [115-135] 134  Resp:  [18-22] 18  BP: (105-133)/(66-85) 105/69  SpO2:  [96 %-100 %] 98 %  Orders received: EKG ordered

## 2020-01-02 NOTE — PROVIDER NOTIFICATION
Time of notification: 4:59 AM  Provider notified: Gold overnight  Patient status: Pt's heart rate 130s-140s  Temp:  [97.2  F (36.2  C)-98.3  F (36.8  C)] 97.2  F (36.2  C)  Pulse:  [134-135] 134  Heart Rate:  [115-135] 134  Resp:  [18-22] 18  BP: (105-133)/(66-85) 105/69  SpO2:  [96 %-100 %] 98 %  Orders received: no new orders received

## 2020-01-02 NOTE — CONSULTS
Nephrology Initial Consult  2020      Charanjit Ibarra MRN:[id number] YOB: 1960  Date of Admission:2019  Primary care provider: Venancio Rodriguez  Requesting physician: Martin Bhandari*    ASSESSMENT AND RECOMMENDATIONS:   Patient had code blue  And    Did not see patient

## 2020-01-02 NOTE — PROGRESS NOTES
Calorie Count  Intake recorded for: 1/1  Total Kcals: 408 Total Protein: 14g  Kcals from Hospital Food: 408  Protein: 14g  Kcals from Outside Food (average):0 Protein: 0g  # Meals Recorded: 100% 2 servings of pureed mixed berries, 50% hot black tea, less than 25% pureed chicken & mashed potatoes w/ gravy  # Supplements Recorded: 100% 1 Boost Glucose Control

## 2020-01-02 NOTE — PROGRESS NOTES
Diabetes Consult Daily  Progress Note          Assessment/Plan:                          Charanjit Ibarra is a 59 year old male with history of type 2 diabetes, hypothyroidism, cirrhosis 2/2 alcohol abuse ( sober 5 years), anemia and thrombocytopenia, hepatocellular carcinoma (HCC), esophageal varices transfered from OSH and  admitted on (12/24/2019) with worsening liver failure and spontaneous bacterial peritonitis.    Hospital course complicated by:  Decompensated ETOH cirrhosis  + SBP  E. Coli bacteremia  Bacterial peritonitis 2/2 acute cholecystitis  Possible HCC     Type 2 diabetic: A1C is not an accurate measure in ESLD and anemia. A1C 6.8% with HGB 9.5 on (12/25/2019)  -suspect if the insulin was given in the adomen, it was not absorbing 2/2 ascites  -will need to give the insulin in either the arms or thighs.     Plan  - continue with IV insulin for now    -Novolog: prandial insulin 1 unit per 5 grams of CHO for meals/sacks/supplements  - most likely will require assistance with diabetes management after discharge ( discussed with significant other, Bridgete, she is agreeable to manage his diabetes at discharge. Will require education), order placed  -patient care order to given insulin injections in either the arms or legs only   -will continue to follow                          Outpatient diabetes follow up: TBD  Plan discussed with patient, family,bedside RN, and primary team.    Inpatient Diabetes Service will sign-off, discussed with Shayan Robins PA-C, please contact via job code pager [phone] for questions.           Interval History:   The last 24 hours progress and nursing notes reviewed.  Continues on IV insulin  Drip rates 2-4 units/hour for glucose < 180  Now with leukocytosis and concern for C.diff colitis    PTA:  Not sure what he was taking at home  lantus 15 units  Novolog sliding scale  Recent Labs   Lab 01/02/20  1100 01/02/20  1009 01/02/20  0911 01/02/20  0802  01/02/20  0656 01/02/20  0616 01/02/20  0518  01/01/20  2242  01/01/20  0516  12/31/19  0458  12/30/19  0456  12/30/19  0101   GLC  --   --   --   --   --   --  160*  --  236*  --  156*  --  135*  --  206*  --  400*   * 135* 118* 128* 108* 128*  --    < >  --    < >  --    < > 121*   < >  --    < >  --     < > = values in this interval not displayed.               Review of Systems:   See interval hx          Medications:     Orders Placed This Encounter      Dysphagia Diet Level 1 Pureed Nectar Thickened Liquids (pre-thickened or use instant food thickener)       Physical Exam:  Gen: Alert, sitting in chair, NAD   HEENT: icteric sclera, mucous membranes are moist  Resp: non-labored  Ext: moving all extremities   Neuro:oriented person, encephalopathic, garbled speech   /79   Pulse 146   Temp 98.8  F (37.1  C) (Axillary)   Resp (!) 31   Wt 81.5 kg (179 lb 10.8 oz)   SpO2 99%   BMI 27.32 kg/m             Data:     Lab Results   Component Value Date    A1C 6.8 12/25/2019              CBC RESULTS:   Recent Labs   Lab Test 01/02/20  0905   WBC 36.7*   RBC 2.32*   HGB 8.0*   HCT 26.7*   *   MCH 34.5*   MCHC 30.0*   RDW 22.1*        Recent Labs   Lab Test 01/02/20 0518 01/01/20  2242   * 144   POTASSIUM 3.7 3.4   CHLORIDE 120* 119*   CO2 14* 16*   ANIONGAP 13 9   * 236*   BUN 34* 32*   CR 1.28* 1.07   TRA 8.8 9.0     Liver Function Studies -   Recent Labs   Lab Test 01/02/20 0518   PROTTOTAL 5.4*   ALBUMIN 3.2*   BILITOTAL 14.0*   ALKPHOS 72   AST 34   ALT 27     Lab Results   Component Value Date    INR 3.32 01/02/2020    INR 2.54 01/01/2020    INR 2.45 12/31/2019    INR 2.49 12/30/2019    INR 2.84 12/29/2019    INR 2.47 12/28/2019    INR 2.22 12/27/2019    INR 2.21 12/26/2019    INR 2.33 12/25/2019    INR 1.85 02/05/2019    INR 1.80 05/09/2018    INR 1.93 12/19/2017       timbo Ramsey -[id number]  Diabetes Management job code [id number]

## 2020-01-02 NOTE — PLAN OF CARE
Neuro: Confused, oriented to self. Picking at air, hallucinating. Garbled, rambled speech. Restless. PRN Zyprexa given x1 w/no effect. Redirection provided. Sitter at bedside for safety.  Cardiac: ST with HR's 110-120's. VSS. Afebrile.        Respiratory: Sating >92% on RA. LS clear/diminished.  GI/: Adequate UOP. BM X4. Scheduled lactulose given. C/o of abd pain/discomfort. Abd distended. PRN Oxy given x1. CT Chest/Abdomen/Pelvis ordered.  Diet/appetite: Tolerating DD1 diet. On carb coverage/calorie counts. Snacks/supplements provided. Poor appetite. Encouraged PO intake.   Activity: Assist of 1, up to chair and SBC. Repositions independently.  Pain: C/o generalized pain/cramping. PRN Flexeril given x2 w/o relief.  Skin: No new deficits noted.  LDA's: 2 L PIV's - infusing D5 at 175mL/hr and insulin gtt, Algo 4.     Plan: Has transfer orders. Continue with POC. Notify primary team with changes.

## 2020-01-02 NOTE — PLAN OF CARE
Discharge Planner SLP   Patient plan for discharge: none stated   Current status: SLP: Pt with persisting overt s/sx of aspiration on thin liquids. Pt remains appropriate to continue dysphagia diet 1 (pureed) and nectar thick liquids with 1:1 supervision. Pt should be fully upright and alert for all PO, take small sips/bites, slow pacing, and alternate between consistencies. ST to continue to follow.   Barriers to return to prior living situation: dysphagia, confusion, medical readiness   Recommendations for discharge: TCU  Rationale for recommendations: pt would benefit from continued ST to safely return to baseline diet level        Entered by: Maria G Dave 01/02/2020 10:11 AM

## 2020-01-02 NOTE — DISCHARGE SUMMARY
[facility], [place]    Death Summary - Hospitalist Service, Gold 4     Date of Admission:  12/24/2019  Date of Death: 1/2/2020  Discharging Provider: Alberto Robins PA-C    Hospital Course   Charanjit Ibarra is a 59 year old male with history of alcoholic cirrhosis c/b esophageal varices, possible HCC, and type II DM who was transferred from outside hospital on 12/24/19 for higher level of care due to sepsis from E.coli bacteremia, SBP, acute cholecystitis, and worsening liver failure.     He was continued on broad spectrum antibiotics with ertapenem upon transfer. Repeat paracentesis 12/25 continued to show significant ascites PMNs with cultures growing pansensitive E.coli. Subsequent paracenteses on 12/27 and 12/30 showed improving PMN counts and cultures remain negative. ID was consulted and transitioned to Ceftriaxone + flagyl on 12/30. It is unclear if bacteremia is secondary to SBP vs cholecystitis. Repeat blood cultures since 12/25 were negative for growth. Patient was felt to be a poor candidate for cholecystectomy. GI was consulted, s/p ERCP with biliary stent placement 12/28. Patient clinically improved following antibiotics and interventions, however continued to have waxing/waning mentation with evidence of acute delirium. This was felt to be secondary to sepsis, hospital environment, and insomnia. He did not have asterixis on exam, therefore hepatic encephalopathy was not suspected.     On 1/1/20 patient was noted to have WBC 14.4. He remained afebrile with stable vitals, and otherwise appeared to be clinically improving. He did not have any localizing signs/symptoms of infection on exam, however patient was not a reliable historian at that time due to waxing/waning mentation. A CT chest/abd/pelvis was obtained to further evaluate leukocytosis. CT showed evidence of resolving cholecystitis with biliary stent in adequate position. He was also noted to have thickening of  the rectosigmoid colon, which was felt to be possible bowel wall edema in setting of liver disease and ascites, however colitis could not be ruled out. Patient was previously noted to have diarrhea, however this had resolved. His abdominal exam was unremarkable.     During the night of 1/1-1/2/20 he developed sinus tachycardia with HR int he 140's. The patient reported increased abdominal distension and requested paracentesis. On the morning of 1/2/20 his mentation appeared improved, however he was noted to have severe abdominal tenderness on exam. His WBC was up to 40k and his lactate was 13.8. He remained tachycardic and subsequently became hypotensive. He remained afebrile, however severe sepsis was of utmost concern. He was started on PO vancomycin for possible fulminant C.diff, and IV antibiotics were broadened to meropenem, vancomycin, and flagyl. Ischemic bowel was also considered. AXR was negative for free air, therefore unlikely to have suffered a perforated bowel. The case was discussed with ICU staff and it was decided to transfer the patient to the ICU for ongoing care. He developed acidemia with pH 7.0 on VBG with evidence of pulmonary compensation. The patient eventually became hypercapnic and therefore a code blue was called for intubation. Following intubation the patient developed PEA arrest. Despite attempts to resuscitate he remained pulseless and was pronounced dead.         Cause of death: PEA arrest in setting of severe sepsis and acidemia       Alberto Robins PA-C  [facility], [place]  ______________________________________________________________________      Significant Results and Procedures   Recent Labs   Lab 01/02/20  1404 01/02/20  0518 01/01/20  2242 01/01/20  0516 12/31/19  0458  12/30/19  0456  12/28/19  2236 12/28/19  0258   * 146* 144 150* 149*  --  144   < > 140 134   POTASSIUM 6.6* 3.7 3.4 3.9 3.8   < > 3.2*   < > 3.6 3.8   CHLORIDE 122* 120*  119* 125* 122*  --  114*   < > 109 104   CO2 12* 14* 16* 18* 19*  --  19*   < > 17* 20   ANIONGAP 19* 13 9 7 8  --  11   < > 13 10   GLC 71 160* 236* 156* 135*  --  206*   < > 305* 232*   BUN 37* 34* 32* 34* 39*  --  45*   < > 45* 46*   CR 2.17* 1.28* 1.07 1.08 0.89  --  0.98   < > 1.08 1.06   TRA 8.6 8.8 9.0 10.2* 10.8*  --  10.6*   < > 9.8 9.5   MAG 1.6  --   --   --  1.7  --   --   --  2.1 2.0   PHOS 9.0*  --   --  2.6 2.3*  --   --   --  2.6 2.8   PROTTOTAL  --  5.4*  --  6.6* 6.3*  --  6.2*   < > 5.9* 5.7*   ALBUMIN  --  3.2*  --  3.8 3.7  --  3.9   < > 4.0 4.2   BILITOTAL  --  14.0*  --  13.2* 12.3*  --  13.2*   < > 15.5* 16.9*   ALKPHOS  --  72  --  85 73  --  72   < > 61 57   AST  --  34  --  50* 63*  --  47*   < > 29 27   ALT  --  27  --  33 34  --  27   < > 19 20    < > = values in this interval not displayed.     Recent Labs   Lab 01/02/20  1404 01/02/20  0905 01/02/20  0518 01/01/20  0516   WBC 25.6* 36.7* 40.1* 14.4*   RBC 1.38* 2.32* 2.42* 3.05*   HGB 5.0* 8.0* 8.5* 10.5*   HCT 17.2* 26.7* 26.4* 33.2*   * 115* 109* 109*   MCH 36.2* 34.5* 35.1* 34.4*   MCHC 29.1* 30.0* 32.2 31.6   RDW 23.7* 22.1* 21.1* 20.4*    230 253 76*     Recent Labs   Lab 01/02/20  1404 01/02/20  0518 01/01/20  0516 12/31/19  0458   INR 7.38* 3.32* 2.54* 2.45*        1/2/2020 11:50 1/2/2020 14:04   Ph Venous 7.01 (LL) 6.82 (LL)   PCO2 Venous 30 (L) 61 (H)   PO2 Venous 27 18 (L)   Bicarbonate Venous 8 (LL) 10 (LL)   Base Deficit Venous 21.9 21.8          Glucose Values Latest Ref Rng & Units 1/2/2020 1/2/2020 1/2/2020 1/2/2020 1/2/2020 1/2/2020 1/2/2020   Bedside Glucose (mg/dl )  - -- -- -- -- -- -- --   GLUCOSE 70 - 99 mg/dL 128(H) 118(H) 135(H) 132(H) 114(H) 80 71   Some recent data might be hidden            CT CHEST/ABDOMEN/PELVIS W CONTRAST  1/1/2020 7:41 PM     FINDINGS:     CHEST: Normal heart size, no pericardial effusion. Normal caliber  thoracic aorta and main pulmonary artery. Normal branching of  the  thoracic great vessels. Normal thyroid. No suspicious lower cervical,  axillary, or mediastinal lymphadenopathy. Normal caliber esophagus.  Mild gynecomastia.     Central tracheobronchial tree is widely patent. No pleural effusion or  pneumothorax. Consolidative opacities with platelike atelectasis at  the lower lobes, left greater than right. No suspicious pulmonary mass  or nodule.      ABDOMEN/PELVIS: Moderate volume ascites. In the pelvis, there is  continued peritoneal enhancement and loculation of the ascites which  has not substantially changed. Overall volume is also similar to  prior. Cirrhotic morphology of the liver. Redemonstration intrahepatic  portal vein thrombus which has now increased and involves the entire  right portal venous system. Status post gallbladder drain, with tip  terminating in the duodenum. Interval slight decompression of the  gallbladder. Mild gallbladder mural enhancement and scattered  radiodense stones. Mild pericholecystic fluid. Overall, the  gallbladder appears improved from prior. Partial fatty replacement of  the pancreas. Splenomegaly. Substantial splenic, gastric, and  esophageal varices. The adrenal glands are unremarkable. Left renal  hypodensity, too small to characterize but statistically a simple  cyst. Kidneys are otherwise unremarkable. Interval decompression of  small bowel. Mild mucosal thickening of the large bowel, particularly  at the rectosigmoid, possibly secondary to edema and surrounding  ascites. Prominent reactive appearing mesenteric and retroperitoneal  lymph notes. For example, there is a 9 mm left periaortic lymph node  (series 3, image 349). Major abdominal vasculature is widely patent.  Mild aortobiiliac atherosclerotic disease.     Decompressed bladder. No suspicious pelvic mass. No concerning  inguinal or pelvic lymphadenopathy.     BONES AND SOFT TISSUES: No acute specific bony abnormality.  Postsurgical changes of the femur with  intramedullary roosevelt. Osteopenic  appearance of bones. Small right inguinal hernia with ascites fluid.  Small umbilical hernia with internal ascites.                                                                    IMPRESSION:  1. Moderate volume ascites with mild heterogenous appearance and peritoneal thickening in the pelvis findings compatible with culture proven SBP.  2. Thick and edematous rectosigmoid colon, likely secondary to surrounding ascites, however cannot exclude superimposed colitis.  3. Status post biliary drain placement with improved distention of the gallbladder.   4. Cirrhotic liver with evidence of portal hypertension and large splenic and gastric varices.  5. Increase in intrahepatic portal vein thrombus with complete involvement of the right portal vein.  6. Redemonstration of bibasilar consolidative opacities/platelike atelectasis.        Consultations This Hospital Stay   ADVANCE DIRECTIVE IP CONSULT  GI HEPATOLOGY ADULT IP CONSULT  VASCULAR ACCESS CARE ADULT IP CONSULT  INTERNAL MEDICINE PROCEDURE TEAM ADULT IP CONSULT [facility]  NUTRITION SERVICES ADULT IP CONSULT  SURGERY GENERAL ADULT IP CONSULT  SOCIAL WORK IP CONSULT  TRANSPLANT SURGERY LIVER ADULT IP CONSULT  NEUROLOGY GENERAL ADULT IP CONSULT  NEUROPSYCHOLOGY IP CONSULT  SOCIAL WORK IP CONSULT  CARDIOLOGY GENERAL ADULT IP CONSULT  PHARMACY TO DOSE VANCO  INTERVENTIONAL RADIOLOGY ADULT/PEDS IP CONSULT  INTERNAL MEDICINE PROCEDURE TEAM ADULT IP CONSULT [facility]  PHYSICAL THERAPY ADULT IP CONSULT  OCCUPATIONAL THERAPY ADULT IP CONSULT  INFECTIOUS DISEASE GENERAL ADULT IP CONSULT  VASCULAR ACCESS CARE ADULT IP CONSULT  PHARMACY IP CONSULT  VASCULAR ACCESS CARE ADULT IP CONSULT  ENDOCRINE DIABETES ADULT IP CONSULT  INTERNAL MEDICINE PROCEDURE TEAM ADULT IP CONSULT [facility] - DIALYSIS NON TUNNELED CENTRAL LINE PLACEMENT  PSYCHIATRY IP CONSULT  VASCULAR ACCESS CARE ADULT IP CONSULT  SPEECH LANGUAGE PATH ADULT  IP CONSULT  VASCULAR ACCESS CARE ADULT IP CONSULT  MEDICATION HISTORY IP PHARMACY CONSULT  VASCULAR ACCESS CARE ADULT IP CONSULT  NEPHROLOGY GENERAL ADULT IP CONSULT  INTERNAL MEDICINE PROCEDURE TEAM ADULT IP CONSULT [facility]  DIABETES EDUCATION IP CONSULT  PHARMACY TO DOSE VANCO  VASCULAR ACCESS ADULT IP CONSULT  VASCULAR ACCESS CARE ADULT IP CONSULT  PHYSICAL THERAPY ADULT IP CONSULT  OCCUPATIONAL THERAPY ADULT IP CONSULT    Primary Care Physician   Venancio Rodriguez    Time Spent on this Encounter   I, Alberto Robins PA-C, personally saw the patient today and spent greater than 30 minutes discharging this patient.

## 2020-01-03 NOTE — PROGRESS NOTES
Assessed patient several times during the day.    - On AM rounds, he appeared quite uncomfortable and complaining of abdominal pain that was generalized. On exam, abdomen was soft and minimal bowel sounds heard.    - Noted an increased in WBC, so started on PO Vanc and spoke with ID and GI to increase coverage of his antibiotics and add antifungals.    - DDx of worsening clinical condition including elevated WBC, worsening tachycardia and abdominal pain/tenderness  Considered  - Ischemic bowel / Bowel perforation  - Worsening GB infection requiring surgical intervention  - C.Diff infection that was new and untreated  - Fungal peritonitis    - KUB Stat was obtained that did not show free air.   - lactate and ABG obtained. Elevated lactate of 13 and VBG with pH of 7.0  - bicarb given, spoke with ICU staff and was planned for transfer  - Plan to c/s gen surg once his HD were stabilized  - Spoke and updated family at bedside    - Prior to patient going down for ICU, his resp status worsened and was intubated.  - Shortly after intubation, he lost his pulse and was coded by the ICU team. Death declared at 1421.  - Family updated by me personally. Death certificate filled out.

## 2020-01-04 LAB
BACTERIA SPEC CULT: NO GROWTH
SPECIMEN SOURCE: NORMAL

## 2020-01-05 LAB
BACTERIA SPEC CULT: ABNORMAL
BACTERIA SPEC CULT: ABNORMAL
SPECIMEN SOURCE: ABNORMAL

## 2020-01-06 ENCOUNTER — PRE VISIT (OUTPATIENT)
Dept: VASCULAR SURGERY | Facility: CLINIC | Age: 60
End: 2020-01-06

## 2020-01-21 NOTE — CODE/RAPID RESPONSE
CODE BLUE NOTE    Called in afternoon regarding MICU transfer for pt w/ lactic acidosis (lactate 14, pH 7.01, PCO2 30) in the setting of decompensated liver disease, bacteremia, cholecystitis. Went to beside with MICU team; patient encephalopathic, not redirectable. Vitals notable for tachycardia in 100s, tachypnea. CPAP initiated but given concern for AMS, code blue called by MICU team (intubation for airway protection).    Shortly after intubation pulses were lost and ACLS was initiated near 1:45-1:50. Multiple rounds of CPR, epi, bicarb, Mg, and Ca were given. ISTAT revealing for acidosis pH 6.8, lactate 18, hyperkalemia, anemia. Bedside ECHO w/ very poor cardiac squeeze, thus PE felt unlikely and TPA not administered.    Time of death 2:21. Family updated and at bedside.    Laura Obrien MD, MPH  Internal Medicine PGY-2  [facility]   no school

## 2021-05-24 ENCOUNTER — RECORDS - HEALTHEAST (OUTPATIENT)
Dept: ADMINISTRATIVE | Facility: CLINIC | Age: 61
End: 2021-05-24

## 2021-05-26 ENCOUNTER — RECORDS - HEALTHEAST (OUTPATIENT)
Dept: ADMINISTRATIVE | Facility: CLINIC | Age: 61
End: 2021-05-26

## 2021-06-01 ENCOUNTER — RECORDS - HEALTHEAST (OUTPATIENT)
Dept: ADMINISTRATIVE | Facility: CLINIC | Age: 61
End: 2021-06-01

## 2021-06-02 ENCOUNTER — RECORDS - HEALTHEAST (OUTPATIENT)
Dept: ADMINISTRATIVE | Facility: CLINIC | Age: 61
End: 2021-06-02

## 2021-06-30 NOTE — PROGRESS NOTES
"Per :  \" He did not want to schedule a pain clinic appointment. He stated that he has already had 4 appointments in the pain clinic and did not want another one. I let him know that the referral is placed for him so he does have the option to schedule an appointment if he changes his mind\".    Patient agreed in clinic with plan for consult. This was communicated to , Janny, and Dr. Dietz.  " Returned patent's call, left message on machine.    Covid vaccine completed.

## 2023-08-31 NOTE — PLAN OF CARE
A: Oriented to self only, pt illogical with flight of ideas and garbled speech, intermittently alert-lethargic overnight, pt fighting sleep despite writer promoting rest and giving prns. VSS. Sinus Tach 100-120. Afebrile. Denies pain and nausea. DD1 nectar thickened liquids, pt tolerating. Loose BM's. Voiding AUO, intermittently incontinent urine and stool. No new skin deficits noted. Alg 4 on insulin gtt. Pt repositions self. Sitter at bedside for patient safety. Significant other, Kylee at bedside overnight, supportive.     I/O this shift:  In: -   Out: 175 [Other:175]    Temp:  [97.6  F (36.4  C)-98.4  F (36.9  C)] 98.2  F (36.8  C)  Pulse:  [117-130] 121  Heart Rate:  [117-130] 127  Resp:  [18-22] 20  BP: (108-140)/(74-93) 138/80  SpO2:  [95 %-100 %] 98 %     R: Continue with POC. Notify primary team with changes.    31-Aug-2023 03:23 31-Aug-2023 03:25

## 2025-07-14 NOTE — PROVIDER NOTIFICATION
12/27/19 1900   Post RRT Intervention Assessment   Post RRT Assessment Stable/Improved   Date Follow Up Done 12/27/19   Time Follow Up Done 2150   Comments Vitals remain stable, abdomen distended but unchanged since 6 p.m. per bedside RN;  receriving albumin and IVF, bedside RN will check with MD on when LA level will be repeated.        [id number]

## (undated) DEVICE — INFLATION DEVICE BIG 60 ENDO-AN6012

## (undated) DEVICE — CATH RETRIEVAL BALLOON EXTRACTOR PRO RX-S INJ ABOVE 9-12MM

## (undated) DEVICE — ENDO BITE BLOCK ADULT OMNI-BLOC

## (undated) DEVICE — ENDO CATH BALLOON DILATION HURRICANE 04MMX4X180CM M00545900

## (undated) DEVICE — Device

## (undated) DEVICE — SOL WATER IRRIG 1000ML BOTTLE 2F7114

## (undated) DEVICE — BIOPSY VALVE BIOSHIELD 00711135

## (undated) DEVICE — WIRE GUIDE 0.025"X270CM ANG VISIGLIDE G-240-2527A

## (undated) DEVICE — ENDO FUSION OMNI-TOME G31903

## (undated) DEVICE — KIT ENDO FIRST STEP DISINFECTANT 200ML W/POUCH EP-4

## (undated) DEVICE — PACK ENDOSCOPY GI CUSTOM UMMC

## (undated) DEVICE — SUCTION MANIFOLD DORNOCH ULTRA CART UL-CL500

## (undated) DEVICE — ENDO TUBING CO2 SMARTCAP STERILE DISP 100145CO2EXT

## (undated) RX ORDER — MORPHINE SULFATE 2 MG/ML
INJECTION, SOLUTION INTRAMUSCULAR; INTRAVENOUS
Status: DISPENSED
Start: 2019-01-01

## (undated) RX ORDER — METOPROLOL TARTRATE 1 MG/ML
INJECTION, SOLUTION INTRAVENOUS
Status: DISPENSED
Start: 2017-12-18

## (undated) RX ORDER — IOPAMIDOL 510 MG/ML
INJECTION, SOLUTION INTRAVASCULAR
Status: DISPENSED
Start: 2019-01-01

## (undated) RX ORDER — SODIUM CHLORIDE 9 MG/ML
INJECTION, SOLUTION INTRAVENOUS
Status: DISPENSED
Start: 2019-01-01

## (undated) RX ORDER — FENTANYL CITRATE 50 UG/ML
INJECTION, SOLUTION INTRAMUSCULAR; INTRAVENOUS
Status: DISPENSED
Start: 2019-01-01

## (undated) RX ORDER — DOBUTAMINE HYDROCHLORIDE 200 MG/100ML
INJECTION INTRAVENOUS
Status: DISPENSED
Start: 2017-12-18